# Patient Record
Sex: FEMALE | Race: BLACK OR AFRICAN AMERICAN | NOT HISPANIC OR LATINO | Employment: FULL TIME | URBAN - METROPOLITAN AREA
[De-identification: names, ages, dates, MRNs, and addresses within clinical notes are randomized per-mention and may not be internally consistent; named-entity substitution may affect disease eponyms.]

---

## 2017-01-17 PROCEDURE — 99283 EMERGENCY DEPT VISIT LOW MDM: CPT | Mod: 25

## 2017-01-17 PROCEDURE — 81025 URINE PREGNANCY TEST: CPT | Performed by: EMERGENCY MEDICINE

## 2017-01-18 ENCOUNTER — NURSE TRIAGE (OUTPATIENT)
Dept: ADMINISTRATIVE | Facility: CLINIC | Age: 32
End: 2017-01-18

## 2017-01-18 ENCOUNTER — HOSPITAL ENCOUNTER (EMERGENCY)
Facility: OTHER | Age: 32
Discharge: HOME | End: 2017-01-18
Attending: EMERGENCY MEDICINE
Payer: COMMERCIAL

## 2017-01-18 VITALS
OXYGEN SATURATION: 99 % | DIASTOLIC BLOOD PRESSURE: 93 MMHG | BODY MASS INDEX: 37.74 KG/M2 | TEMPERATURE: 98 F | SYSTOLIC BLOOD PRESSURE: 147 MMHG | HEART RATE: 73 BPM | HEIGHT: 63 IN | RESPIRATION RATE: 16 BRPM | WEIGHT: 213 LBS

## 2017-01-18 DIAGNOSIS — R51.9 ACUTE NONINTRACTABLE HEADACHE, UNSPECIFIED HEADACHE TYPE: Primary | ICD-10-CM

## 2017-01-18 LAB
B-HCG UR QL: NEGATIVE
CTP QC/QA: YES

## 2017-01-18 PROCEDURE — 25000003 PHARM REV CODE 250: Performed by: EMERGENCY MEDICINE

## 2017-01-18 RX ORDER — BUTALBITAL, ACETAMINOPHEN AND CAFFEINE 50; 325; 40 MG/1; MG/1; MG/1
1 TABLET ORAL
Status: COMPLETED | OUTPATIENT
Start: 2017-01-18 | End: 2017-01-18

## 2017-01-18 RX ADMIN — BUTALBITAL, ACETAMINOPHEN AND CAFFEINE 1 TABLET: 50; 325; 40 TABLET ORAL at 12:01

## 2017-01-18 NOTE — ED NOTES
Pt given d/c instructions reported she had no pain and only wanted a work note. Pt ambulated with steady gait to d/c window and d/c in stable condition.

## 2017-01-18 NOTE — TELEPHONE ENCOUNTER
Was seen in ED last evening for a headache and was given medication for the headache. Now does not feel any better. Feeling nauseated and not feeling well.Pt calling to find out if the medication is causing her nausea. Pt advised that I am not able to determine if medication given 8 hours ago is causing nausea.  Pt advised to follow up with PCP when office opens to discuss nausea and follow up from ED.

## 2017-01-18 NOTE — TELEPHONE ENCOUNTER
Reason for Disposition   Caller has NON-URGENT medication question about med that PCP prescribed and triager unable to answer question    Protocols used: ST MEDICATION QUESTION CALL-A-  pt advised to urgent care for severe nausea prior to consulting with PCP.

## 2017-01-18 NOTE — ED AVS SNAPSHOT
[facility]  [address] [address]               Azucena Magana   2017 12:16 AM   ED    Description:  Female : 1985   Department:  [facility]           Your Care was Coordinated By:     Provider Role From To    Papito Lazo MD Attending Provider 17 0017 --      Reason for Visit     Headache           Diagnoses this Visit        Comments    Acute nonintractable headache, unspecified headache type    -  Primary       ED Disposition     None           To Do List           Follow-up Information     Follow up with Yovanny Lott Jr, MD. Schedule an appointment as soon as possible for a visit in 3 days.    Specialty:  Family Medicine    Contact information:    [address]  [facility] [id number] [phone]          Follow up with [facility].    Specialty:  Emergency Medicine    Why:  If symptoms worsen    Contact information:    [address] [address] [phone]      [facility]     [facility] -  Assistance  Registered nurses in the [facility] provide clinical advisement, health education, appointment booking, and other advisory services.  Call for this free service at [phone].             Medications           Message regarding Medications     Verify the changes and/or additions to your medication regime listed below are the same as discussed with your clinician today.  If any of these changes or additions are incorrect, please notify your healthcare provider.        These medications were administered today        Dose Freq    butalbital-acetaminophen-caffeine -40 mg per tablet 1 tablet 1 tablet ED 1 Time    Sig: Take 1 tablet by mouth ED 1 Time.    Class: Normal    Route: Oral           Verify that the below list of medications is an accurate representation of the medications you are  "currently taking.  If none reported, the list may be blank. If incorrect, please contact your healthcare provider. Carry this list with you in case of emergency.           Current Medications     hydrochlorothiazide (MICROZIDE) 12.5 mg capsule Take 12.5 mg by mouth once daily.    ibuprofen (ADVIL,MOTRIN) 600 MG tablet Take 1 tablet (600 mg total) by mouth every 6 (six) hours as needed for Pain (cramping).    butalbital-acetaminophen-caffeine -40 mg per tablet 1 tablet Take 1 tablet by mouth ED 1 Time.           Clinical Reference Information           Your Vitals Were     BP Pulse Temp Resp Height Weight    156/82 (BP Location: Left arm, Patient Position: Sitting) 76 98.2 °F (36.8 °C) (Oral) 14 5' 3" (1.6 m) 96.6 kg (213 lb)    SpO2 BMI             100% 37.73 kg/m2         Allergies as of 1/18/2017     No Known Allergies      Immunizations Administered on Date of Encounter - 1/18/2017     None      ED Micro, Lab, POCT     Start Ordered       Status Ordering Provider    01/17/17 2245 01/17/17 2244  POCT urine pregnancy  Once      Final result       ED Imaging Orders     None      Discharge References/Attachments     HEADACHES, SELF-CARE FOR (ENGLISH)       [facility] complies with applicable Federal civil rights laws and does not discriminate on the basis of race, color, national origin, age, disability, or sex.        Language Assistance Services     ATTENTION: Language assistance services are available, free of charge. Please call [phone].      ATENCIÓN: Si habla español, tiene a ventura disposición servicios gratuitos de asistencia lingüística. Llame al [phone].     [facility] Ý: N?u b?n nói Ti?ng Vi?t, có các d?ch v? h? tr? ngôn ng? mi?n phí dành cho b?n. G?i s? [phone].        "

## 2017-01-18 NOTE — ED PROVIDER NOTES
"Encounter Date: 1/17/2017    SCRIBE #1 NOTE: I, Kena Ash , am scribing for, and in the presence of, Dr. Lazo.       History     Chief Complaint   Patient presents with    Headache     X 3 or 4 days did not take anything for it     Review of patient's allergies indicates:  No Known Allergies  HPI Comments: Time seen by provider: 12:30 AM    This is a 31 y.o. female who presents with complaint of headache. Symptoms began four days ago. Frontal headache is described as an intermittent and progressively worsening "band-like" sensation. Pt has no other complaints, and denies fever, chills, nausea, vomiting, congestion, myalgias, weakness, or numbness. Pt experiences headaches with high blood pressure, and took Ibuprofen this morning with little relief.    The history is provided by the patient.     Past Medical History   Diagnosis Date    Abnormal Pap smear of cervix     Hypertension     Migraine headache     Obesity      No past medical history pertinent negatives.  Past Surgical History   Procedure Laterality Date    Pr treat ectopic preg,non remval Right 7/12/15     Right salpingectomy and partial cornual resection     Family History   Problem Relation Age of Onset    Ovarian cancer Maternal Grandmother     Colon cancer Neg Hx     Breast cancer Neg Hx      Social History   Substance Use Topics    Smoking status: Never Smoker    Smokeless tobacco: None    Alcohol use No     Review of Systems   Constitutional: Negative for chills and fever.   HENT: Negative for congestion and sore throat.    Eyes: Negative for redness and visual disturbance.   Respiratory: Negative for cough and shortness of breath.    Cardiovascular: Negative for chest pain and palpitations.   Gastrointestinal: Negative for abdominal pain, diarrhea, nausea and vomiting.   Genitourinary: Negative for dysuria.   Musculoskeletal: Negative for back pain and myalgias.   Skin: Negative for rash.   Neurological: Positive for headaches. " Negative for weakness and numbness.   Psychiatric/Behavioral: Negative for confusion.       Physical Exam   Initial Vitals   BP Pulse Resp Temp SpO2   01/17/17 2242 01/17/17 2242 01/17/17 2242 01/17/17 2242 01/17/17 2242   156/82 76 14 98.2 °F (36.8 °C) 100 %     Physical Exam    Nursing note and vitals reviewed.  Constitutional: She appears well-developed and well-nourished. She is not diaphoretic. No distress.   HENT:   Head: Normocephalic and atraumatic.   Right Ear: External ear normal.   Left Ear: External ear normal.   Eyes: Conjunctivae and EOM are normal. Pupils are equal, round, and reactive to light. Right eye exhibits no discharge. Left eye exhibits no discharge. No scleral icterus.   Neck: Normal range of motion. Neck supple.   Cardiovascular: Normal rate, regular rhythm, normal heart sounds and intact distal pulses. Exam reveals no gallop and no friction rub.    No murmur heard.  Pulmonary/Chest: Breath sounds normal. No stridor. No respiratory distress. She has no wheezes. She has no rhonchi. She has no rales.   Abdominal: Soft. She exhibits no distension. There is no tenderness. There is no rebound and no guarding.   Musculoskeletal: Normal range of motion. She exhibits no edema or tenderness.   Neurological: She is alert and oriented to person, place, and time. She has normal strength. No cranial nerve deficit.   Cranial nerves II through XII grossly intact.  5/5 motor strength all 4 extremities.  Sensation is normal.  Finger to nose normal.  Gait normal.  Speech and cognition is normal.  No focal neurologic deficit.   Skin: Skin is warm and dry. No rash noted. No erythema. No pallor.   Psychiatric: She has a normal mood and affect. Her behavior is normal. Judgment and thought content normal.         ED Course   Procedures  Labs Reviewed   POCT URINE PREGNANCY             Medical Decision Making:   Clinical Tests:   Lab Tests: Ordered and Reviewed  ED Management:  Well-appearing patient presents with  "complaint of headache.  There are no findings concerning for subarachnoid hemorrhage.  Namely she reports this headache started off several days ago as one of her "usual headaches.  She cannot pinpoint the time that it started, it was not maximal at onset.  Initially resolved with over-the-counter medications as her headache usually do.  The HA has returned several times in the weekend.  And she awoke with it again this morning.  Progressively worsening.  Bandlike.  Does not involve her neck at all.  And is not the worst headache of her life.  Completely normal neurologic examination.  Administered Fioricet and discharged to be driven home by her  that she might rest break the cycle of pain.    UNA Lazo M.D.  01/18/2017  5:17 AM              Scribe Attestation:   Scribe #1: I performed the above scribed service and the documentation accurately describes the services I performed. I attest to the accuracy of the note.    Attending Attestation:           Physician Attestation for Scribe:  Physician Attestation Statement for Scribe #1: I, Dr. Lazo, reviewed documentation, as scribed by Kena Ash  in my presence, and it is both accurate and complete.                 ED Course     Clinical Impression:     1. Acute nonintractable headache, unspecified headache type                Papito Lazo MD  01/18/17 0517    "

## 2017-12-21 ENCOUNTER — HOSPITAL ENCOUNTER (EMERGENCY)
Facility: OTHER | Age: 32
Discharge: HOME | End: 2017-12-22
Attending: EMERGENCY MEDICINE
Payer: COMMERCIAL

## 2017-12-21 DIAGNOSIS — S39.012A BACK STRAIN, INITIAL ENCOUNTER: Primary | ICD-10-CM

## 2017-12-21 LAB
B-HCG UR QL: NEGATIVE
CTP QC/QA: YES

## 2017-12-21 PROCEDURE — 99283 EMERGENCY DEPT VISIT LOW MDM: CPT | Mod: 25

## 2017-12-21 PROCEDURE — 96372 THER/PROPH/DIAG INJ SC/IM: CPT

## 2017-12-21 PROCEDURE — 81025 URINE PREGNANCY TEST: CPT | Performed by: EMERGENCY MEDICINE

## 2017-12-21 RX ORDER — IBUPROFEN 800 MG/1
800 TABLET ORAL EVERY 6 HOURS PRN
Qty: 20 TABLET | Refills: 0 | OUTPATIENT
Start: 2017-12-21 | End: 2020-03-02

## 2017-12-21 RX ORDER — CYCLOBENZAPRINE HCL 10 MG
10 TABLET ORAL 3 TIMES DAILY PRN
Qty: 15 TABLET | Refills: 0 | Status: SHIPPED | OUTPATIENT
Start: 2017-12-21 | End: 2017-12-26

## 2017-12-21 RX ORDER — ORPHENADRINE CITRATE 30 MG/ML
60 INJECTION INTRAMUSCULAR; INTRAVENOUS
Status: COMPLETED | OUTPATIENT
Start: 2017-12-22 | End: 2017-12-21

## 2017-12-21 RX ADMIN — ORPHENADRINE CITRATE 60 MG: 30 INJECTION INTRAMUSCULAR; INTRAVENOUS at 11:12

## 2017-12-22 VITALS
DIASTOLIC BLOOD PRESSURE: 86 MMHG | RESPIRATION RATE: 18 BRPM | SYSTOLIC BLOOD PRESSURE: 136 MMHG | HEIGHT: 63 IN | BODY MASS INDEX: 42.07 KG/M2 | WEIGHT: 237.44 LBS | TEMPERATURE: 98 F | OXYGEN SATURATION: 100 % | HEART RATE: 91 BPM

## 2017-12-22 PROCEDURE — 63600175 PHARM REV CODE 636 W HCPCS: Performed by: EMERGENCY MEDICINE

## 2017-12-22 NOTE — ED PROVIDER NOTES
Encounter Date: 12/21/2017    SCRIBE #1 NOTE: IKera, am scribing for, and in the presence of, Dr. Mcgregor.       History     Chief Complaint   Patient presents with    Back Pain     lumbar back pain x 1 week, picked up a heavy box of papers last week @ work     Time seen by provider: 11:37 PM    This is a 32 y.o. female who presents with complaint of lower back pain x 5 days. Pt states pain started about 2 days after picking up heavy boxes at work. Pain is to the mid back, radiating diffusely throughout the back. It is exacerbated with movement and mitigated with staying still. She describes the pain as an aching pain, with intermittent episodes of increased severity. Pain is rated 8/10. She feels pain in her chest with the episodes of increased severity. Pt reports no associated numbness, weakness, tingling, bowel incontinence, urinary incontinence, or urinary retention. She denies any fever, chills, dysuria, urinary frequency, palpitations, and cough.    Pt also states that her LNMP was the end of October to the beginning of November. She admits that her cycle is irregular, but states that this is abnormal. Pt had 3 (-) at home pregnancy tests. She denies any N/V/D and abdominal pain.      The history is provided by the patient.     Review of patient's allergies indicates:  No Known Allergies  Past Medical History:   Diagnosis Date    Abnormal Pap smear of cervix     Hypertension     Migraine headache     Obesity      Past Surgical History:   Procedure Laterality Date    SC TREAT ECTOPIC PREG,NON REMVAL Right 7/12/15    Right salpingectomy and partial cornual resection     Family History   Problem Relation Age of Onset    Ovarian cancer Maternal Grandmother     Colon cancer Neg Hx     Breast cancer Neg Hx      Social History   Substance Use Topics    Smoking status: Never Smoker    Smokeless tobacco: Never Used    Alcohol use No     Review of Systems   Constitutional: Negative for chills  and fever.   HENT: Negative for congestion, rhinorrhea and sore throat.    Respiratory: Negative for cough and shortness of breath.    Cardiovascular: Positive for chest pain (with back pain). Negative for palpitations and leg swelling.   Gastrointestinal: Negative for abdominal pain, diarrhea, nausea and vomiting.        No bowel incontinence.   Endocrine: Negative for polyuria.   Genitourinary: Negative for decreased urine volume, dysuria, frequency and urgency.        No urinary incontinence or retention.   Musculoskeletal: Positive for back pain. Negative for gait problem and neck pain.   Skin: Negative for rash.   Allergic/Immunologic: Negative for immunocompromised state.   Neurological: Negative for dizziness, weakness, light-headedness and numbness.        No tingling.   Hematological: Does not bruise/bleed easily.   Psychiatric/Behavioral: Negative for confusion.       Physical Exam     Initial Vitals [12/21/17 2222]   BP Pulse Resp Temp SpO2   (!) 171/85 100 20 98.3 °F (36.8 °C) 100 %      MAP       113.67         Physical Exam    Nursing note and vitals reviewed.  Constitutional: She appears well-developed and well-nourished. She is not diaphoretic. No distress.   HENT:   Head: Normocephalic and atraumatic.   Right Ear: External ear normal.   Left Ear: External ear normal.   Eyes: Right eye exhibits no discharge. Left eye exhibits no discharge.   Neck: Normal range of motion. Neck supple.   Cardiovascular: Normal rate, regular rhythm and normal heart sounds. Exam reveals no gallop and no friction rub.    No murmur heard.  Pulmonary/Chest: Breath sounds normal. No respiratory distress. She has no wheezes. She has no rhonchi. She has no rales.   Abdominal: Soft. Bowel sounds are normal. She exhibits no distension. There is no tenderness. There is no rebound and no guarding.   Musculoskeletal: Normal range of motion. She exhibits no edema.   L lumbar and R thoracic paraspinal tenderness. Diffuse midline  thoracic tenderness. No focal bony tenderness.   Lymphadenopathy:     She has no cervical adenopathy.   Neurological: She is alert and oriented to person, place, and time. She has normal strength. No sensory deficit.   Strength 5/5 to all extremities. Gait normal.   Skin: Skin is warm and dry. No rash noted. No erythema.   Psychiatric: She has a normal mood and affect. Her behavior is normal.         ED Course   Procedures  Labs Reviewed   POCT URINE PREGNANCY - Normal             Medical Decision Making:   Clinical Tests:   Lab Tests: Ordered and Reviewed    Additional MDM:   Comments: 32-year-old female presents complaining of back pain after heavy lifting.  On exam she had diffuse tenderness to palpation of the thoracic and lumbar paraspinal regions.  Her exam was otherwise unremarkable.  Patient was treated with Norflex in the emergency department and discharged home with a prescription for Motrin and Flexeril..          Scribe Attestation:   Scribe #1: I performed the above scribed service and the documentation accurately describes the services I performed. I attest to the accuracy of the note.    Attending Attestation:           Physician Attestation for Scribe:  Physician Attestation Statement for Scribe #1: I, Dr. Mcgregor, reviewed documentation, as scribed by Kera Stanton in my presence, and it is both accurate and complete.                 ED Course      Clinical Impression:     1. Back strain, initial encounter                               Fatimah Mcgregor MD  12/22/17 8341

## 2017-12-22 NOTE — ED NOTES
Pt c/o back pain x 2 days after picking up a box full of papers at work. Pain in between scapula, rates pain 8/10. Pain worse when moves shoulders. Pt denies any paresthesias. Pt denies any arm weaknesses. Pt in NAD

## 2018-02-19 ENCOUNTER — TELEPHONE (OUTPATIENT)
Dept: PLASTIC SURGERY | Facility: CLINIC | Age: 33
End: 2018-02-19

## 2018-02-19 NOTE — TELEPHONE ENCOUNTER
Pt needs consult appt for breast reduction    Called and left message for  to call back for an appointment

## 2018-02-19 NOTE — TELEPHONE ENCOUNTER
----- Message from Fredi Sanz sent at 2/19/2018  3:01 PM CST -----  Pt needs consult appt for breast reduction. Please call the pt at [phone]

## 2018-02-25 ENCOUNTER — HOSPITAL ENCOUNTER (EMERGENCY)
Facility: OTHER | Age: 33
Discharge: HOME | End: 2018-02-25
Attending: EMERGENCY MEDICINE
Payer: COMMERCIAL

## 2018-02-25 VITALS
HEIGHT: 62 IN | RESPIRATION RATE: 18 BRPM | OXYGEN SATURATION: 100 % | SYSTOLIC BLOOD PRESSURE: 120 MMHG | WEIGHT: 212 LBS | TEMPERATURE: 98 F | DIASTOLIC BLOOD PRESSURE: 72 MMHG | BODY MASS INDEX: 39.01 KG/M2 | HEART RATE: 87 BPM

## 2018-02-25 DIAGNOSIS — L03.119 CELLULITIS OF ELBOW: Primary | ICD-10-CM

## 2018-02-25 LAB
B-HCG UR QL: NEGATIVE
CTP QC/QA: YES

## 2018-02-25 PROCEDURE — 25000003 PHARM REV CODE 250: Performed by: PHYSICIAN ASSISTANT

## 2018-02-25 PROCEDURE — 81025 URINE PREGNANCY TEST: CPT | Performed by: EMERGENCY MEDICINE

## 2018-02-25 PROCEDURE — 99284 EMERGENCY DEPT VISIT MOD MDM: CPT | Mod: 25

## 2018-02-25 RX ORDER — IBUPROFEN 600 MG/1
600 TABLET ORAL
Status: COMPLETED | OUTPATIENT
Start: 2018-02-25 | End: 2018-02-25

## 2018-02-25 RX ORDER — CEPHALEXIN 500 MG/1
500 CAPSULE ORAL 4 TIMES DAILY
Qty: 20 CAPSULE | Refills: 0 | Status: SHIPPED | OUTPATIENT
Start: 2018-02-25 | End: 2018-03-02

## 2018-02-25 RX ORDER — SULFAMETHOXAZOLE AND TRIMETHOPRIM 800; 160 MG/1; MG/1
1 TABLET ORAL 2 TIMES DAILY
Qty: 14 TABLET | Refills: 0 | Status: SHIPPED | OUTPATIENT
Start: 2018-02-25 | End: 2018-03-04

## 2018-02-25 RX ORDER — IBUPROFEN 600 MG/1
600 TABLET ORAL EVERY 6 HOURS PRN
Qty: 20 TABLET | Refills: 0 | OUTPATIENT
Start: 2018-02-25 | End: 2020-03-02

## 2018-02-25 RX ORDER — MUPIROCIN 20 MG/G
1 OINTMENT TOPICAL
Status: COMPLETED | OUTPATIENT
Start: 2018-02-25 | End: 2018-02-25

## 2018-02-25 RX ADMIN — IBUPROFEN 600 MG: 600 TABLET, FILM COATED ORAL at 01:02

## 2018-02-25 RX ADMIN — MUPIROCIN 22 G: 20 OINTMENT TOPICAL at 01:02

## 2018-02-25 NOTE — ED NOTES
Two patient identifiers have been checked and are correct.      Appearance: Pt awake, alert & oriented to person, place & time. Pt in no acute distress at present time. Pt is clean and well groomed with clothes appropriately fastened.   Skin: Skin warm, dry & intact. Color consistent with ethnicity. Mucous membranes moist. No breakdown or brusing noted. + red raised area noted to left elbow, no drainage or warmth noted to site.   Musculoskeletal: Patient moving all extremities well, no obvious swelling or deformities noted.   Respiratory: Respirations spontaneous, even, and non-labored. Visible chest rise noted. Airway is open and patent. No accessory muscle use noted.   Neurologic: Sensation is intact. Speech is clear and appropriate. Eyes open spontaneously, behavior appropriate to situation, follows commands, facial expression symmetrical, bilateral hand grasp equal and even, purposeful motor response noted.  Cardiac: All peripheral pulses present. No Bilateral lower extremity edema. Cap refill is <3 seconds.

## 2018-02-25 NOTE — ED PROVIDER NOTES
"Encounter Date: 2/25/2018       History     Chief Complaint   Patient presents with    Abscess     + left sided abscess " to my elbow" since Friday. Denies drainge to area, fever or chills     Patient is 32-year-old female who presents with complaints of area of pain, swelling, tenderness to the left elbow.  Reports area has been present for 2 days prior to arrival.  She reports no pain with moving her elbow only with palpating over the area.  She reports her  tried to squeeze pus out of this "abscess" earlier today and she reports it began to swell and became very painful after doing this.  She has not been taking any medications to help with symptoms.  She has been applying warm compresses but admits she is only doing this once a day.  She denies fever, chills, nausea, vomiting, chest pain, shortness of breath.  No elbow trauma or injury.  No history of elbow injury or surgery in the past.  Currently unaccompanied in the ER.          Review of patient's allergies indicates:  No Known Allergies  Past Medical History:   Diagnosis Date    Abnormal Pap smear of cervix     Hypertension     Migraine headache     Obesity      Past Surgical History:   Procedure Laterality Date    MO TREAT ECTOPIC PREG,NON REMVAL Right 7/12/15    Right salpingectomy and partial cornual resection     Family History   Problem Relation Age of Onset    Ovarian cancer Maternal Grandmother     Colon cancer Neg Hx     Breast cancer Neg Hx      Social History   Substance Use Topics    Smoking status: Never Smoker    Smokeless tobacco: Never Used    Alcohol use No     Review of Systems   Constitutional: Negative for fever.   HENT: Negative for sore throat.    Respiratory: Negative for shortness of breath.    Cardiovascular: Negative for chest pain.   Gastrointestinal: Negative for abdominal pain, diarrhea, nausea and vomiting.   Genitourinary: Negative for dysuria.   Musculoskeletal: Negative for back pain.        Left elbow pain "   Skin: Positive for wound. Negative for rash.   Neurological: Negative for weakness.   Hematological: Does not bruise/bleed easily.       Physical Exam     Initial Vitals [02/25/18 1203]   BP Pulse Resp Temp SpO2   (!) 159/86 100 18 97.3 °F (36.3 °C) 98 %      MAP       110.33         Physical Exam    Nursing note and vitals reviewed.  Constitutional: She appears well-developed and well-nourished. She is not diaphoretic. No distress.   Healthy appearing female in no acute distress or apparent pain.  She makes good eye contact, speaks in clear full sentences and ambulates with ease.   HENT:   Head: Normocephalic and atraumatic.   Eyes: Conjunctivae and EOM are normal. Pupils are equal, round, and reactive to light. Right eye exhibits no discharge. Left eye exhibits no discharge. No scleral icterus.   Neck: Normal range of motion.   Cardiovascular: Normal rate, regular rhythm, normal heart sounds and intact distal pulses. Exam reveals no gallop and no friction rub.    No murmur heard.  Pulmonary/Chest: Breath sounds normal. She has no wheezes. She has no rhonchi. She has no rales.   Abdominal: Soft. Bowel sounds are normal. There is no tenderness. There is no rebound and no guarding.   Musculoskeletal: Normal range of motion. She exhibits no edema or tenderness.   Left elbow has area of exquisite tenderness to palpation with warm to touch over olecranon process.  This area is 2 cm circular area with centralized fluctuance.  Fluctuant area is approximately 0.5 cm in circumference.  No active bleeding or purulence.  Normal range of motion especially flexion and extension and supination pronation of the elbow.  No lymphangitis, extremity edema, abnormal pulse, sensation or strength against resistance.   Lymphadenopathy:     She has no cervical adenopathy.   Neurological: She is alert and oriented to person, place, and time. She has normal strength. No cranial nerve deficit or sensory deficit.   Skin: Skin is warm.  Capillary refill takes less than 2 seconds. No rash and no abscess noted. No erythema.   Psychiatric: She has a normal mood and affect. Her behavior is normal. Thought content normal.         ED Course   Procedures  Labs Reviewed   POCT URINE PREGNANCY             Medical Decision Making:   ED Management:  Urgent evaluation of 32-year-old female who presents with complaints most consistent with cellulitis and abscess to left elbow.  She is afebrile, nontoxic appearing, hemodynamically stable.  Physical exam outlined above and reveals centralized fluctuance with very superficial appearing pocket of infection surrounding cellulitis.  No concern for septic joint.  Patient adamantly refuses I&D even though I clearly explain the analgesia process.  In the setting of no I&D I feel the second best option is to apply antibiotic ointment topically with accompanying warm compresses multiple times throughout the day.  Will also prescribe Keflex & Bactrim to be taken orally with anti-inflammatories for pain.  She is strictly instructed on appropriate management in the outpatient setting and is told if symptoms worsen to any degree in the next 24 hours she has to return for reconsideration of I&D.  She verbalizes understanding and is amenable to this plan.                      Clinical Impression:   The encounter diagnosis was Cellulitis of elbow.                           Yolanda Feng PA-C  02/25/18 3950

## 2018-06-06 ENCOUNTER — HOSPITAL ENCOUNTER (EMERGENCY)
Facility: OTHER | Age: 33
Discharge: HOME | End: 2018-06-06
Attending: EMERGENCY MEDICINE
Payer: COMMERCIAL

## 2018-06-06 VITALS
TEMPERATURE: 98 F | OXYGEN SATURATION: 98 % | RESPIRATION RATE: 18 BRPM | DIASTOLIC BLOOD PRESSURE: 87 MMHG | SYSTOLIC BLOOD PRESSURE: 152 MMHG | BODY MASS INDEX: 38.98 KG/M2 | WEIGHT: 220 LBS | HEIGHT: 63 IN | HEART RATE: 80 BPM

## 2018-06-06 DIAGNOSIS — R51.9 ACUTE NONINTRACTABLE HEADACHE, UNSPECIFIED HEADACHE TYPE: Primary | ICD-10-CM

## 2018-06-06 LAB
B-HCG UR QL: NEGATIVE
CTP QC/QA: YES

## 2018-06-06 PROCEDURE — 63600175 PHARM REV CODE 636 W HCPCS: Performed by: PHYSICIAN ASSISTANT

## 2018-06-06 PROCEDURE — 96372 THER/PROPH/DIAG INJ SC/IM: CPT

## 2018-06-06 PROCEDURE — 99283 EMERGENCY DEPT VISIT LOW MDM: CPT | Mod: 25

## 2018-06-06 PROCEDURE — 81025 URINE PREGNANCY TEST: CPT | Performed by: PHYSICIAN ASSISTANT

## 2018-06-06 PROCEDURE — 25000003 PHARM REV CODE 250: Performed by: PHYSICIAN ASSISTANT

## 2018-06-06 RX ORDER — BUTALBITAL, ACETAMINOPHEN AND CAFFEINE 50; 325; 40 MG/1; MG/1; MG/1
2 TABLET ORAL
Status: COMPLETED | OUTPATIENT
Start: 2018-06-06 | End: 2018-06-06

## 2018-06-06 RX ORDER — DIPHENHYDRAMINE HYDROCHLORIDE 50 MG/ML
25 INJECTION INTRAMUSCULAR; INTRAVENOUS
Status: COMPLETED | OUTPATIENT
Start: 2018-06-06 | End: 2018-06-06

## 2018-06-06 RX ORDER — METOCLOPRAMIDE HYDROCHLORIDE 5 MG/ML
10 INJECTION INTRAMUSCULAR; INTRAVENOUS
Status: COMPLETED | OUTPATIENT
Start: 2018-06-06 | End: 2018-06-06

## 2018-06-06 RX ADMIN — BUTALBITAL, ACETAMINOPHEN AND CAFFEINE 2 TABLET: 50; 325; 40 TABLET ORAL at 05:06

## 2018-06-06 RX ADMIN — METOCLOPRAMIDE 10 MG: 5 INJECTION, SOLUTION INTRAMUSCULAR; INTRAVENOUS at 05:06

## 2018-06-06 RX ADMIN — DIPHENHYDRAMINE HYDROCHLORIDE 25 MG: 50 INJECTION, SOLUTION INTRAMUSCULAR; INTRAVENOUS at 05:06

## 2018-06-06 NOTE — ED NOTES
Pt to ED with frontal headache x 3 days. Pt denies nausea, vomiting, blurred vision, photophobia, extremity weakness/numbness/tingling. She also reports she has not taken her BP medication x 3 days due to not feeling well. Pt AAOx4 and appropriate at this time. Respirations even and unlabored. No acute distress noted.

## 2018-06-07 NOTE — ED PROVIDER NOTES
Encounter Date: 6/6/2018       History     Chief Complaint   Patient presents with    Headache     Patient c/o headache that started monday.  States pain is localized to the front of her head.  States she has similar headaches when she has elevated pressure.       Patient is a 33-year-old female with history of hypertension and chronic headaches who presents to the emergency department with headache.  Patient states since Monday she has been having a headache that has progressively worsened.  She rates her pain 10/10.  She states typically the pain resolved on its own.  She denies associated photophobia or visual disturbance.  She denies facial asymmetry, speech slur, or extremity weakness. She denies neck stiffness. She denies fevers or chills. She denies nausea or vomiting.      The history is provided by the patient.     Review of patient's allergies indicates:  No Known Allergies  Past Medical History:   Diagnosis Date    Abnormal Pap smear of cervix     Hypertension     Migraine headache     Obesity      Past Surgical History:   Procedure Laterality Date    [place] TREAT ECTOPIC PREG,NON REMVAL Right 7/12/15    Right salpingectomy and partial cornual resection     Family History   Problem Relation Age of Onset    Ovarian cancer Maternal Grandmother     Colon cancer Neg Hx     Breast cancer Neg Hx      Social History   Substance Use Topics    Smoking status: Never Smoker    Smokeless tobacco: Never Used    Alcohol use No     Review of Systems   Constitutional: Negative for activity change, appetite change, chills, fatigue and fever.   HENT: Negative for congestion, ear discharge, ear pain, postnasal drip, rhinorrhea, sore throat and trouble swallowing.    Eyes: Negative for photophobia and visual disturbance.   Respiratory: Negative for cough and shortness of breath.    Cardiovascular: Negative for chest pain.   Gastrointestinal: Negative for abdominal pain, blood in stool, constipation, diarrhea, nausea  and vomiting.   Genitourinary: Negative for dysuria, flank pain and hematuria.   Musculoskeletal: Negative for back pain, neck pain and neck stiffness.   Skin: Negative for rash and wound.   Neurological: Positive for headaches. Negative for dizziness, syncope, speech difficulty, weakness, light-headedness and numbness.       Physical Exam     Initial Vitals [06/06/18 1636]   BP Pulse Resp Temp SpO2   133/80 70 18 98.1 °F (36.7 °C) 100 %      MAP       97.67         Physical Exam    Nursing note and vitals reviewed.  Constitutional: Vital signs are normal. She appears well-developed and well-nourished. She is not diaphoretic.  Non-toxic appearance. No distress.   HENT:   Head: Normocephalic.   Right Ear: Hearing, tympanic membrane, external ear and ear canal normal.   Left Ear: Hearing, tympanic membrane, external ear and ear canal normal.   Nose: Nose normal.   Mouth/Throat: Uvula is midline, oropharynx is clear and moist and mucous membranes are normal. No oropharyngeal exudate.   Eyes: Conjunctivae are normal. Pupils are equal, round, and reactive to light.   Neck: Normal range of motion and full passive range of motion without pain. Neck supple. Normal range of motion present. No neck rigidity.   Cardiovascular: Normal rate and regular rhythm.   Pulmonary/Chest: Breath sounds normal.   Abdominal: Soft. Bowel sounds are normal. There is no tenderness.   Lymphadenopathy:     She has no cervical adenopathy.   Neurological: She is alert and oriented to person, place, and time. She has normal strength. No cranial nerve deficit or sensory deficit. She displays a negative Romberg sign. Coordination and gait normal.   Skin: Skin is warm and dry. Capillary refill takes less than 2 seconds.   Psychiatric: She has a normal mood and affect.         ED Course   Procedures  Labs Reviewed   POCT URINE PREGNANCY          No orders to display        Medical Decision Making:   Initial Assessment:   Urgent evaluation of a  33-year-old female with history of hypertension and chronic headaches who presents to the emergency department with headache.  Patient is afebrile, nontoxic appearing, and hemodynamically stable.  Normal neuro exam.  No nuchal rigidity.  This feels like patient's typical headache pain.  I do not suspect intracranial abnormality or meningitis.    One view evaluation,  patient is smiling and does not appear in any distress. Patient was given migraine concoction.  She is advised to follow up with PCP or return to the emergency department with any worsening symptoms or concerns.  Other:   I have discussed this case with another health care provider.       <> Summary of the Discussion: Dr. Rutledge                      Clinical Impression:   The encounter diagnosis was Acute nonintractable headache, unspecified headache type.                             Viktoriya Coates PA-C  06/06/18 1900

## 2020-03-02 ENCOUNTER — HOSPITAL ENCOUNTER (EMERGENCY)
Facility: OTHER | Age: 35
Discharge: HOME | End: 2020-03-02
Attending: EMERGENCY MEDICINE
Payer: COMMERCIAL

## 2020-03-02 VITALS
TEMPERATURE: 99 F | DIASTOLIC BLOOD PRESSURE: 108 MMHG | SYSTOLIC BLOOD PRESSURE: 168 MMHG | WEIGHT: 208 LBS | OXYGEN SATURATION: 100 % | RESPIRATION RATE: 18 BRPM | HEART RATE: 78 BPM | HEIGHT: 64 IN | BODY MASS INDEX: 35.51 KG/M2

## 2020-03-02 DIAGNOSIS — B34.9 VIRAL SYNDROME: Primary | ICD-10-CM

## 2020-03-02 DIAGNOSIS — R05.9 COUGH: ICD-10-CM

## 2020-03-02 LAB
B-HCG UR QL: NEGATIVE
CTP QC/QA: YES
CTP QC/QA: YES
POC MOLECULAR INFLUENZA A AGN: NEGATIVE
POC MOLECULAR INFLUENZA B AGN: NEGATIVE

## 2020-03-02 PROCEDURE — 81025 URINE PREGNANCY TEST: CPT | Performed by: EMERGENCY MEDICINE

## 2020-03-02 PROCEDURE — 25000003 PHARM REV CODE 250: Performed by: PHYSICIAN ASSISTANT

## 2020-03-02 PROCEDURE — 99284 EMERGENCY DEPT VISIT MOD MDM: CPT | Mod: 25

## 2020-03-02 RX ORDER — IBUPROFEN 600 MG/1
600 TABLET ORAL
Status: COMPLETED | OUTPATIENT
Start: 2020-03-02 | End: 2020-03-02

## 2020-03-02 RX ORDER — FLUTICASONE PROPIONATE 50 MCG
1 SPRAY, SUSPENSION (ML) NASAL 2 TIMES DAILY PRN
Qty: 15 G | Refills: 0 | Status: ON HOLD | OUTPATIENT
Start: 2020-03-02 | End: 2021-03-13 | Stop reason: HOSPADM

## 2020-03-02 RX ORDER — BENZONATATE 100 MG/1
100 CAPSULE ORAL 3 TIMES DAILY PRN
Qty: 20 CAPSULE | Refills: 0 | Status: SHIPPED | OUTPATIENT
Start: 2020-03-02 | End: 2020-03-12

## 2020-03-02 RX ORDER — IBUPROFEN 600 MG/1
600 TABLET ORAL EVERY 6 HOURS PRN
Qty: 20 TABLET | Refills: 0 | Status: SHIPPED | OUTPATIENT
Start: 2020-03-02 | End: 2020-07-22 | Stop reason: ALTCHOICE

## 2020-03-02 RX ADMIN — IBUPROFEN 600 MG: 600 TABLET, FILM COATED ORAL at 12:03

## 2020-03-02 NOTE — ED PROVIDER NOTES
Encounter Date: 3/2/2020       History     Chief Complaint   Patient presents with    Fever     Fever of 101 last night with chills and a sore throat.      Patient is a 34-year-old female presenting to the emergency department complaints of fever, body aches, cough and congestion.  Patient states that her symptoms started 3 days ago and have been constant since.  She states that she has a cough that is occasionally productive of mucus.  She admits that she had a fever of 101° F last night.  She also reports a sore throat and body aches.  She denies any vomiting. She states she did taking Tylenol and cold and flu medication over-the-counter for symptoms, last dose was yesterday.  She admits she has multiple known sick contacts, stating that everyone were gets sick.  She did not get a flu shot this year.This is the extent of the patient's complaints at this time.       The history is provided by the patient.     Review of patient's allergies indicates:  No Known Allergies  Past Medical History:   Diagnosis Date    Abnormal Pap smear of cervix     Hypertension     Migraine headache     Obesity      Past Surgical History:   Procedure Laterality Date    [place] TREAT ECTOPIC PREG,NON REMVAL Right 7/12/15    Right salpingectomy and partial cornual resection     Family History   Problem Relation Age of Onset    Ovarian cancer Maternal Grandmother     Colon cancer Neg Hx     Breast cancer Neg Hx      Social History     Tobacco Use    Smoking status: Never Smoker    Smokeless tobacco: Never Used   Substance Use Topics    Alcohol use: No    Drug use: No     Review of Systems   Constitutional: Positive for fatigue and fever. Negative for activity change, appetite change and chills.   HENT: Positive for congestion, rhinorrhea and sore throat.    Eyes: Negative for photophobia and visual disturbance.   Respiratory: Positive for cough. Negative for shortness of breath and wheezing.    Cardiovascular: Negative for chest  pain.   Gastrointestinal: Negative for abdominal pain, diarrhea, nausea and vomiting.   Genitourinary: Negative for dysuria, hematuria and urgency.   Musculoskeletal: Positive for myalgias. Negative for back pain and neck pain.   Skin: Negative for color change and wound.   Neurological: Negative for weakness and headaches.   Psychiatric/Behavioral: Negative for agitation and confusion.       Physical Exam     Initial Vitals [03/02/20 1145]   BP Pulse Resp Temp SpO2   (!) 158/100 78 18 98.8 °F (37.1 °C) 100 %      MAP       --         Physical Exam    Nursing note and vitals reviewed.  Constitutional: She appears well-developed and well-nourished. She is not diaphoretic. She is cooperative.  Non-toxic appearance. She does not have a sickly appearance. She does not appear ill. No distress.   Well-appearing,  female accompanied the emergency room.  Speaking clearly full sentences.  No acute distress.   HENT:   Head: Normocephalic and atraumatic.   Right Ear: Hearing, tympanic membrane, external ear and ear canal normal.   Left Ear: Hearing, tympanic membrane, external ear and ear canal normal.   Nose: Mucosal edema present.   Mouth/Throat: Oropharynx is clear and moist and mucous membranes are normal.   Eyes: Conjunctivae and EOM are normal.   Neck: Normal range of motion. Neck supple.   Cardiovascular: Normal rate, regular rhythm and normal heart sounds.   Pulmonary/Chest: Breath sounds normal. No respiratory distress. She has no wheezes.   Abdominal: Soft. Bowel sounds are normal. She exhibits no distension. There is no tenderness. There is no rebound and no guarding.   Musculoskeletal: Normal range of motion.   Neurological: She is alert and oriented to person, place, and time. GCS eye subscore is 4. GCS verbal subscore is 5. GCS motor subscore is 6.   Skin: Skin is warm.   Psychiatric: She has a normal mood and affect. Her behavior is normal. Judgment and thought content normal.         ED Course    Procedures  Labs Reviewed   POCT URINE PREGNANCY   POCT INFLUENZA A/B MOLECULAR          Imaging Results          X-Ray Chest PA And Lateral (Final result)  Result time 03/02/20 12:40:51    Final result by Timothy Desai MD (03/02/20 12:40:51)                 Impression:      No radiographic acute intrathoracic process seen.  Specifically, no focal consolidation.      Electronically signed by: Timothy Desai MD  Date:    03/02/2020  Time:    12:40             Narrative:    EXAMINATION:  XR CHEST PA AND LATERAL    CLINICAL HISTORY:  Cough    TECHNIQUE:  PA and lateral views of the chest were performed.    COMPARISON:  Chest radiograph 11/28/2016    FINDINGS:  No detrimental change.The lungs are clear, with normal appearance of pulmonary vasculature and no pleural effusion or pneumothorax.    The cardiac silhouette is normal in size. The hilar and mediastinal contours are unremarkable.    Bones are intact. Resolution is somewhat limited by body habitus with underpenetration.                              X-Rays:   Independently Interpreted Readings:   Chest X-Ray: Normal heart size.  No infiltrates.  No acute abnormalities.     Medical Decision Making:   Initial Assessment:     Urgent evaluation a 34-year-old female presenting to the emergency department with complaints of flu-like symptoms.  Patient is afebrile, nontoxic appearing, hemodynamically stable. Physical exam reveals regular rate rhythm, lungs are clear to auscultation bilaterally.  Boggy nasal mucosa. No posterior oropharyngeal edema or erythema.  Will plan for rapid influenza, chest x-ray, analgesics and reassess.    Independently Interpreted Test(s):   I have ordered and independently interpreted X-rays - see prior notes.  Clinical Tests:   Lab Tests: Reviewed and Ordered  Radiological Study: Ordered and Reviewed  ED Management:    Rapid influenza is negative.  Chest x-ray is negative for pneumonia or acute process.  Given patient's history and physical  exam findings, signs symptoms most consistent with a viral etiology.  Will plan to treat symptomatically with Tessalon Perles, Flonase, and ibuprofen.  Counseled home care treatment.  Discharged stable condition.The patient was instructed to follow up with a primary care provider in 2 days or to return to the emergency department for worsening symptoms. The treatment plan was discussed with the patient who demonstrated understanding and comfort with plan.    This note was created using [employer] Fluency Direct. There may be typographical errors secondary to dictation.                                    Clinical Impression:     1. Viral syndrome    2. Cough         Disposition:   Disposition: Discharged  Condition: Stable                     Tonia Fu PA-C  03/02/20 0168

## 2020-07-20 ENCOUNTER — TELEPHONE (OUTPATIENT)
Dept: OBSTETRICS AND GYNECOLOGY | Facility: CLINIC | Age: 35
End: 2020-07-20

## 2020-07-20 DIAGNOSIS — Z34.90 PREGNANCY, UNSPECIFIED GESTATIONAL AGE: Primary | ICD-10-CM

## 2020-07-20 NOTE — TELEPHONE ENCOUNTER
----- Message from Laquita Magana MA sent at 7/20/2020  2:55 PM CDT -----  Regarding: FW: New OB  YES, Please schedule patient.   ----- Message -----  From: Johanne Huang  Sent: 7/20/2020  12:38 PM CDT  To: [facility] Staff  Subject: FW: New OB                                       Will dr saab see this patient for her pregnancy?  ----- Message -----  From: Madhavi Mckeon  Sent: 7/20/2020   9:48 AM CDT  To: Mary Atwood  Subject: New OB                                           Who Called: LEFTY MAGANA [[id number]]    Date of Positive Preg Test: 7/20    1st day of Last Menstrual Cycle: 6/14     Preferred Physician: Dr. Saab      List Any Difficulties: N/a      What Number to Call Back:  [phone]    Can the clinic reply in [facility]:

## 2020-07-22 ENCOUNTER — HOSPITAL ENCOUNTER (EMERGENCY)
Facility: OTHER | Age: 35
Discharge: HOME | End: 2020-07-22
Attending: EMERGENCY MEDICINE
Payer: COMMERCIAL

## 2020-07-22 VITALS
DIASTOLIC BLOOD PRESSURE: 94 MMHG | TEMPERATURE: 98 F | BODY MASS INDEX: 34.15 KG/M2 | HEART RATE: 80 BPM | SYSTOLIC BLOOD PRESSURE: 174 MMHG | WEIGHT: 200 LBS | RESPIRATION RATE: 16 BRPM | OXYGEN SATURATION: 99 % | HEIGHT: 64 IN

## 2020-07-22 DIAGNOSIS — R10.9 ABDOMINAL PAIN DURING PREGNANCY IN FIRST TRIMESTER: ICD-10-CM

## 2020-07-22 DIAGNOSIS — O26.891 ABDOMINAL PAIN DURING PREGNANCY IN FIRST TRIMESTER: ICD-10-CM

## 2020-07-22 DIAGNOSIS — O20.0 THREATENED MISCARRIAGE IN EARLY PREGNANCY: ICD-10-CM

## 2020-07-22 DIAGNOSIS — R10.31 RLQ ABDOMINAL PAIN: Primary | ICD-10-CM

## 2020-07-22 LAB
ABO + RH BLD: NORMAL
B-HCG UR QL: POSITIVE
BACTERIA GENITAL QL WET PREP: ABNORMAL
BASOPHILS # BLD AUTO: 0.02 K/UL (ref 0–0.2)
BASOPHILS NFR BLD: 0.3 % (ref 0–1.9)
BILIRUB UR QL STRIP: NEGATIVE
CLARITY UR: CLEAR
CLUE CELLS VAG QL WET PREP: ABNORMAL
COLOR UR: YELLOW
CTP QC/QA: YES
DIFFERENTIAL METHOD: ABNORMAL
EOSINOPHIL # BLD AUTO: 0 K/UL (ref 0–0.5)
EOSINOPHIL NFR BLD: 0.5 % (ref 0–8)
ERYTHROCYTE [DISTWIDTH] IN BLOOD BY AUTOMATED COUNT: 16.1 % (ref 11.5–14.5)
FILAMENT FUNGI VAG WET PREP-#/AREA: ABNORMAL
GLUCOSE UR QL STRIP: NEGATIVE
HCG INTACT+B SERPL-ACNC: 4359 MIU/ML
HCG INTACT+B SERPL-ACNC: 4510 MIU/ML
HCT VFR BLD AUTO: 31.7 % (ref 37–48.5)
HGB BLD-MCNC: 10 G/DL (ref 12–16)
HGB UR QL STRIP: NEGATIVE
IMM GRANULOCYTES # BLD AUTO: 0.02 K/UL (ref 0–0.04)
IMM GRANULOCYTES NFR BLD AUTO: 0.3 % (ref 0–0.5)
KETONES UR QL STRIP: NEGATIVE
LEUKOCYTE ESTERASE UR QL STRIP: NEGATIVE
LYMPHOCYTES # BLD AUTO: 2 K/UL (ref 1–4.8)
LYMPHOCYTES NFR BLD: 26.7 % (ref 18–48)
MCH RBC QN AUTO: 24.6 PG (ref 27–31)
MCHC RBC AUTO-ENTMCNC: 31.5 G/DL (ref 32–36)
MCV RBC AUTO: 78 FL (ref 82–98)
MONOCYTES # BLD AUTO: 0.5 K/UL (ref 0.3–1)
MONOCYTES NFR BLD: 6 % (ref 4–15)
NEUTROPHILS # BLD AUTO: 4.9 K/UL (ref 1.8–7.7)
NEUTROPHILS NFR BLD: 66.2 % (ref 38–73)
NITRITE UR QL STRIP: NEGATIVE
NRBC BLD-RTO: 0 /100 WBC
PH UR STRIP: 6 [PH] (ref 5–8)
PLATELET # BLD AUTO: 291 K/UL (ref 150–350)
PMV BLD AUTO: 9.8 FL (ref 9.2–12.9)
PROT UR QL STRIP: NEGATIVE
RBC # BLD AUTO: 4.06 M/UL (ref 4–5.4)
SP GR UR STRIP: 1.02 (ref 1–1.03)
SPECIMEN SOURCE: ABNORMAL
T VAGINALIS GENITAL QL WET PREP: ABNORMAL
URN SPEC COLLECT METH UR: NORMAL
UROBILINOGEN UR STRIP-ACNC: NEGATIVE EU/DL
WBC # BLD AUTO: 7.45 K/UL (ref 3.9–12.7)
WBC #/AREA VAG WET PREP: ABNORMAL
YEAST GENITAL QL WET PREP: ABNORMAL

## 2020-07-22 PROCEDURE — 85025 COMPLETE CBC W/AUTO DIFF WBC: CPT

## 2020-07-22 PROCEDURE — 87491 CHLMYD TRACH DNA AMP PROBE: CPT

## 2020-07-22 PROCEDURE — 86901 BLOOD TYPING SEROLOGIC RH(D): CPT

## 2020-07-22 PROCEDURE — 87210 SMEAR WET MOUNT SALINE/INK: CPT

## 2020-07-22 PROCEDURE — 84702 CHORIONIC GONADOTROPIN TEST: CPT | Mod: 91

## 2020-07-22 PROCEDURE — 81003 URINALYSIS AUTO W/O SCOPE: CPT

## 2020-07-22 PROCEDURE — 99284 EMERGENCY DEPT VISIT MOD MDM: CPT | Mod: 25

## 2020-07-22 PROCEDURE — 81025 URINE PREGNANCY TEST: CPT | Performed by: NURSE PRACTITIONER

## 2020-07-22 RX ORDER — METOCLOPRAMIDE 10 MG/1
10 TABLET ORAL EVERY 6 HOURS PRN
Qty: 10 TABLET | Refills: 0 | Status: ON HOLD | OUTPATIENT
Start: 2020-07-22 | End: 2021-03-13 | Stop reason: HOSPADM

## 2020-07-22 RX ORDER — ACETAMINOPHEN 325 MG/1
650 TABLET ORAL EVERY 6 HOURS PRN
Qty: 30 TABLET | Refills: 0 | Status: ON HOLD | OUTPATIENT
Start: 2020-07-22 | End: 2021-03-13 | Stop reason: HOSPADM

## 2020-07-22 NOTE — Clinical Note
Azucena Magana was seen and treated in our emergency department on 7/22/2020.  She may return to work on 07/24/2020.       If you have any questions or concerns, please don't hesitate to call.      Ulices Conrad RN RN

## 2020-07-22 NOTE — PROVIDER PROGRESS NOTES - EMERGENCY DEPT.
Emergency Department TeleTRIAGE Encounter Note      CHIEF COMPLAINT    Chief Complaint   Patient presents with    Abdominal Pain     x 2 days RLQ radiating to the R flank       VITAL SIGNS   Initial Vitals [07/22/20 1707]   BP Pulse Resp Temp SpO2   (!) 174/94 98 16 98.4 °F (36.9 °C) --      MAP       --            ALLERGIES    Review of patient's allergies indicates:  No Known Allergies    PROVIDER TRIAGE NOTE  This is a teletriage evaluation of a 35 y.o. female presenting to the ED with c/o right flank/ right lower abdominal pain x 2 days. No N/V/D. No fevers.  Initial orders will be placed and care will be transferred to an alternate provider when patient is roomed for a full evaluation. Any additional orders and the final disposition will be determined by that provider.         ORDERS  Labs Reviewed   URINALYSIS, REFLEX TO URINE CULTURE   POCT URINE PREGNANCY       ED Orders (720h ago, onward)    Start Ordered     Status Ordering Provider    07/22/20 1714 07/22/20 1713  POCT urine pregnancy  Once      Ordered JUD PRADO    07/22/20 1714 07/22/20 1713  Urinalysis, Reflex to Urine Culture Urine, Clean Catch  STAT      Ordered JUD PRADO            Virtual Visit Note: The provider triage portion of this emergency department evaluation and documentation was performed via [employer], a HIPAA-compliant telemedicine application, in concert with a tele-presenter in the room. A face to face patient evaluation with one of my colleagues will occur once the patient is placed in an emergency department room.      DISCLAIMER: This note was prepared with [employer] voice recognition transcription software. Garbled syntax, mangled pronouns, and other bizarre constructions may be attributed to that software system.

## 2020-07-23 ENCOUNTER — TELEPHONE (OUTPATIENT)
Dept: OBSTETRICS AND GYNECOLOGY | Facility: CLINIC | Age: 35
End: 2020-07-23

## 2020-07-23 NOTE — ED TRIAGE NOTES
Pt came to ED c/o RLQ pian x 2 days. Pt reports taking home upt but wsa negitive. Pt denies n/v/d/, vaginal bleeding, and vaginal discharge.

## 2020-07-23 NOTE — TELEPHONE ENCOUNTER
----- Message from Thony Gray sent at 7/23/2020 11:51 AM CDT -----  Regarding: Appointment  Contact: LEFTY REDMOND [[id number]]  Name of Who is Calling: LEFTY REDMOND [[id number]]      What is the request in detail: Would like to speak with staff in regards to an appointment for an ER f/u for possible ectopic pregnancy. Please advise      Can the clinic reply by [facility]: no      What Number to Call Back if not in [facility]: [phone]

## 2020-07-23 NOTE — ED PROVIDER NOTES
Encounter Date: 2020       History     Chief Complaint   Patient presents with    Abdominal Pain     x 2 days RLQ radiating to the R flank     Patient presents complaining of pain in the right lower quadrant for the past 2 days.  States that it feels like when I had a tubal pregnancy in 2015 period she had last menses around , states he was longer than normal but is frequently irregular if she is inconsistent with birth control pills she has been very inconsistent with him over the past month due to change in routine as her mom had COVID infection.  She herself did not have any fever cough shortness of breath to suggest COVID infection she is not having any current vaginal bleeding where she have any spotting, discharge dysuria or other pelvic complaints.  She has had 2 prior normal pregnancies, the 1 ectopic pregnancy, no other miscarriage/.  Reports a history of fibroids.  No other acute complaints        Review of patient's allergies indicates:  No Known Allergies  Past Medical History:   Diagnosis Date    Abnormal Pap smear of cervix     Hypertension     Migraine headache     Obesity      Past Surgical History:   Procedure Laterality Date    WY TREAT ECTOPIC PREG,NON REMVAL Right 7/12/15    Right salpingectomy and partial cornual resection     Family History   Problem Relation Age of Onset    Ovarian cancer Maternal Grandmother     Colon cancer Neg Hx     Breast cancer Neg Hx      Social History     Tobacco Use    Smoking status: Never Smoker    Smokeless tobacco: Never Used   Substance Use Topics    Alcohol use: No    Drug use: No     Review of Systems   Constitutional: Negative.    HENT: Negative.    Respiratory: Negative.    Cardiovascular: Negative.    Gastrointestinal: Positive for abdominal pain. Negative for abdominal distention, blood in stool, constipation, diarrhea, nausea and vomiting.   Genitourinary: Positive for pelvic pain. Negative for difficulty urinating,  dysuria, flank pain, frequency, genital sores, hematuria, vaginal bleeding, vaginal discharge and vaginal pain.   Musculoskeletal: Negative.    Skin: Negative.    Neurological: Negative.    Hematological: Negative.        Physical Exam     Initial Vitals [07/22/20 1707]   BP Pulse Resp Temp SpO2   (!) 174/94 98 16 98.4 °F (36.9 °C) 98 %      MAP       --         Physical Exam    Nursing note and vitals reviewed.  Constitutional: She appears well-developed.   Obese.  Comfortable appearing.  No distress.   HENT:   Head: Normocephalic and atraumatic.   No pallor or icterus   Eyes: Conjunctivae are normal. Pupils are equal, round, and reactive to light.   Neck: Normal range of motion. Neck supple.   Cardiovascular: Normal rate, regular rhythm, normal heart sounds and intact distal pulses.   Pulmonary/Chest: Breath sounds normal. She has no wheezes.   Abdominal: Soft. Bowel sounds are normal. She exhibits no distension. There is no rebound and no guarding.   Tenderness in the right lower quadrant, without rebound or guarding.  Uterus not palpable above the pelvic brim   Genitourinary:    Vagina and uterus normal.      No vaginal discharge.      Genitourinary Comments: Cervix closed.  No CMT.  Tenderness in the region of the right adnexa, no mass appreciated but limited by habitus.     Neurological: She is alert and oriented to person, place, and time.   Skin: Skin is warm and dry. No rash noted.   Psychiatric: She has a normal mood and affect. Thought content normal.         ED Course   Procedures  Labs Reviewed   VAGINAL SCREEN - Abnormal; Notable for the following components:       Result Value    WBC - Vaginal Screen Rare (*)     Bacteria - Vaginal Screen Rare (*)     All other components within normal limits   CBC W/ AUTO DIFFERENTIAL - Abnormal; Notable for the following components:    Hemoglobin 10.0 (*)     Hematocrit 31.7 (*)     Mean Corpuscular Volume 78 (*)     Mean Corpuscular Hemoglobin 24.6 (*)     Mean  Corpuscular Hemoglobin Conc 31.5 (*)     RDW 16.1 (*)     All other components within normal limits   POCT URINE PREGNANCY - Abnormal; Notable for the following components:    POC Preg Test, Ur Positive (*)     All other components within normal limits   C. TRACHOMATIS/N. GONORRHOEAE BY AMP DNA   URINALYSIS, REFLEX TO URINE CULTURE    Narrative:     Specimen Source->Urine   HCG, QUANTITATIVE, PREGNANCY   HCG, QUANTITATIVE, PREGNANCY   GROUP & RH          Imaging Results          US OB <14 Wks TransAbd & TransVag, Single Gestation (XPD) (Final result)  Result time 07/22/20 21:07:32    Final result by Olive Rios MD (07/22/20 21:07:32)                 Impression:      Very early IUP with mean sac diameter corresponding to a gestational age of 5 weeks 1 day.  No fetal pole or yolk sac at this time.  Estimated due date by ultrasound is 03/23/2021.    Left corpus luteum.      Electronically signed by: Olive Rios  Date:    07/22/2020  Time:    21:07             Narrative:    EXAMINATION:  ULTRASOUND OBSTETRICAL ULTRASOUND LESS THAN 14 WEEKS WITH TRANSVAGINAL    CLINICAL HISTORY:  Vag Bleeding;    TECHNIQUE:  Real-time ultrasound obstetrical ultrasound less than 14 weeks was performed transabdominally and  transvaginally.    COMPARISON:  None.    FINDINGS:  The uterus measures 9.6 x 6 x 7.1 cm. There are no uterine masses.  No fetal pole or yolk sac is detected at this time.  Nabothian cysts are seen at the lower uterine segment.    The right ovary is not visualized.  The left ovary measures 2.3 x 3.4 x 3.5 cm.  There is a left corpus luteum measuring 2.4 x 1.4 x 1.7 cm.    There is a tiny amount of free fluid in the cul-de-sac.                                                   ED Course as of Jul 23 1206   Wed Jul 22, 2020   [address] [place] of Care Note:  Discussed pt and plan with prior team.   I independently interpreted and reviewed the patient's ultrasound, notable for no definitive IUP, presumed  gestational sac is identified which by size would be consistent with 5 week 1 day gestation.  I independently reviewed and interpreted labs which are notable for Beta HCG 4510.          [RC]   2153 Patient history, findings, results discussed with OBGYN resident Dr. Mtz who will message OBGYN clinic to facilitate f/u.          [RC]      ED Course User Index  [RC] Manjinder Sotelo MD     Patient with a history of prior ectopic pregnancy, inconsistent use birth control pills presents with right lower quadrant pain x2 days.  UPT is positive.  Exam she has tenderness in the right lower quadrant and the right adnexal region.  Concern is to rule out another ectopic pregnancy.  Will send laboratory studies including beta HCG to help correlate how far along she is.  Pelvic ultrasound to evaluate for IUP versus ectopic.  Care is turned over at 8:00 p.m. pending these results and re-evaluation           Clinical Impression:     1. RLQ abdominal pain    2. Abdominal pain during pregnancy in first trimester    3. Threatened miscarriage in early pregnancy                ED Disposition Condition    Discharge Good        ED Prescriptions     Medication Sig Dispense Start Date End Date Auth. Provider    metoclopramide HCl (REGLAN) 10 MG tablet Take 1 tablet (10 mg total) by mouth every 6 (six) hours as needed (headache, nausea or vomiting). 10 tablet 7/22/2020  Manjinder Sotelo MD    acetaminophen (TYLENOL) 325 MG tablet Take 2 tablets (650 mg total) by mouth every 6 (six) hours as needed for Pain or Temperature greater than (100.3). 30 tablet 7/22/2020  Manjinder Sotelo MD        Follow-up Information     Follow up With Specialties Details Why Contact Info    Yovanny Lott Jr., MD Family Medicine  For recheck with your primary care doctor [address] [facility] [address]  [facility] [address]  [phone]      Joi Najera MD Obstetrics, Obstetrics and Gynecology  For recheck with specialist  [address] [facility] [address]  [facility] [address]  [phone]      [facility] Obstetrics and Gynecology   [facility] [place] [id number] [phone]                                     Roby Salas II, MD  07/22/20 1955       Roby Salas II, MD  07/23/20 1206

## 2020-07-23 NOTE — ED NOTES
Pt rounding complete.  Respirations even and unlabored, in no acute distress.  Call light within reach.  Will continue to monitor.

## 2020-07-28 LAB
C TRACH DNA SPEC QL NAA+PROBE: NOT DETECTED
N GONORRHOEA DNA SPEC QL NAA+PROBE: NOT DETECTED

## 2020-07-29 ENCOUNTER — OFFICE VISIT (OUTPATIENT)
Dept: OBSTETRICS AND GYNECOLOGY | Facility: CLINIC | Age: 35
End: 2020-07-29
Attending: OBSTETRICS & GYNECOLOGY
Payer: COMMERCIAL

## 2020-07-29 ENCOUNTER — HOSPITAL ENCOUNTER (OUTPATIENT)
Dept: RADIOLOGY | Facility: OTHER | Age: 35
Discharge: HOME | End: 2020-07-29
Attending: OBSTETRICS & GYNECOLOGY
Payer: COMMERCIAL

## 2020-07-29 VITALS
HEIGHT: 62 IN | DIASTOLIC BLOOD PRESSURE: 78 MMHG | BODY MASS INDEX: 42.44 KG/M2 | WEIGHT: 230.63 LBS | SYSTOLIC BLOOD PRESSURE: 128 MMHG

## 2020-07-29 DIAGNOSIS — O36.80X0 PREGNANCY OF UNKNOWN ANATOMIC LOCATION: ICD-10-CM

## 2020-07-29 DIAGNOSIS — O36.80X0 PREGNANCY OF UNKNOWN ANATOMIC LOCATION: Primary | ICD-10-CM

## 2020-07-29 PROCEDURE — 76801 OB US < 14 WKS SINGLE FETUS: CPT | Mod: 26,,, | Performed by: RADIOLOGY

## 2020-07-29 PROCEDURE — 76817 TRANSVAGINAL US OBSTETRIC: CPT | Mod: 26,,, | Performed by: RADIOLOGY

## 2020-07-29 PROCEDURE — 99999 PR PBB SHADOW E&M-EST. PATIENT-LVL III: CPT | Mod: PBBFAC,,, | Performed by: OBSTETRICS & GYNECOLOGY

## 2020-07-29 PROCEDURE — 76817 US OB <14 WEEKS, TRANSABDOM & TRANSVAG, SINGLE GESTATION (XPD): ICD-10-PCS | Mod: 26,,, | Performed by: RADIOLOGY

## 2020-07-29 PROCEDURE — 99214 OFFICE O/P EST MOD 30 MIN: CPT | Mod: S$GLB,,, | Performed by: OBSTETRICS & GYNECOLOGY

## 2020-07-29 PROCEDURE — 76801 US OB <14 WEEKS, TRANSABDOM & TRANSVAG, SINGLE GESTATION (XPD): ICD-10-PCS | Mod: 26,,, | Performed by: RADIOLOGY

## 2020-07-29 PROCEDURE — 99214 PR OFFICE/OUTPT VISIT, EST, LEVL IV, 30-39 MIN: ICD-10-PCS | Mod: S$GLB,,, | Performed by: OBSTETRICS & GYNECOLOGY

## 2020-07-29 PROCEDURE — 76801 OB US < 14 WKS SINGLE FETUS: CPT | Mod: TC

## 2020-07-29 PROCEDURE — 99999 PR PBB SHADOW E&M-EST. PATIENT-LVL III: ICD-10-PCS | Mod: PBBFAC,,, | Performed by: OBSTETRICS & GYNECOLOGY

## 2020-07-29 RX ORDER — ACETAMINOPHEN AND CODEINE PHOSPHATE 120; 12 MG/5ML; MG/5ML
SOLUTION ORAL
COMMUNITY
Start: 2020-07-16 | End: 2020-09-14

## 2020-07-29 NOTE — LETTER
July 29, 2020    Azucena Magana  [address]  [facility] [address]         [facility] [address]  [address] [facility] [address]  [facility] [address]  Phone: [phone]  Fax: [phone] July 29, 2020     Patient: Azucena aMgana   YOB: 1985   Date of Visit: 7/29/2020       To Whom It May Concern:    It is my medical opinion that Azucena Magana should remain out of work until 8/03/2020.    If you have any questions or concerns, please don't hesitate to call.    Sincerely,        Henry Aguayo Jr., MD

## 2020-07-29 NOTE — PROGRESS NOTES
07/29/2020 U/S  Intrauterine gestation(s): Single  Mean gestational sac diameter: 1.7 cm  Yolk sac: Present  Crown-rump length (CRL): 0.28 cm  Crdiac activity: Not confidently identified  Subchorionic hemorrhage: None.  Right ovary: Normal.  Left ovary: Normal.  Miscellaneous: No Free Fluid.  There is a 2.6 cm fibroid identified in the uterine fundus.  Impression:  Single intrauterine pregnancy with estimated gestational age 6 weeks 0 days. and an ROSMERY of 03/24/2021.  A fetal pole and yolk sac are identified in today's exam.  No definite fetal heart tones identified.  This may be due to early stage of gestation.  Recommend continued follow-up and correlation with beta HCG..     PT SEEN IN ED LAST WEEK WITH + UPT AND RLQ PAIN.  NO BLEEDING BUT CONT RLQ PAIN. HAS HX ECTOPIC WITH R SALPINGECTOMY     7/22/2020    Hemoglobin 10.0 (L)   Hematocrit 31.7 (L)   hCG Quant 4510   Group & Rh A POS     07/22/2020 U/S  The uterus measures 9.6 x 6 x 7.1 cm. There are no uterine masses.  No fetal pole or yolk sac is detected at this time.  Nabothian cysts are seen at the lower uterine segment.  The right ovary is not visualized.  The left ovary measures 2.3 x 3.4 x 3.5 cm.  There is a left corpus luteum measuring 2.4 x 1.4 x 1.7 cm.  There is a tiny amount of free fluid in the cul-de-sac.  Impression:  Very early IUP with mean sac diameter corresponding to a gestational age of 5 weeks 1 day.  No fetal pole or yolk sac at this time.  Estimated due date by ultrasound is 03/23/2021.  Left corpus luteum.    ROS:  GENERAL: No fever, chills, fatigability or weight loss.  VULVAR: No pain, no lesions and no itching.  VAGINAL: No relaxation, no itching, no discharge, no abnormal bleeding and no lesions.  ABDOMEN: No abdominal pain. Denies nausea. Denies vomiting. No diarrhea. No constipation  BREAST: Denies pain. No lumps. No discharge.  URINARY: No incontinence, no nocturia, no frequency and no dysuria.  CARDIOVASCULAR: No chest pain. No  shortness of breath. No leg cramps.  NEUROLOGICAL: No headaches. No vision changes.  The remainder of the review of systems was negative.    PE:  General Appearance: obese And Well developed. Well nourished. In no acute distress.  Vulva: Lesions: No.  Urethral Meatus: Normal size. Normal location. No lesions. No prolapse.  Urethra: No masses. No tenderness. No prolapse. No scarring.  Bladder: No masses. No tenderness.  Vagina: Mucosa NI:yes discharge no, atrophy no, cystocele no or rectocele no.  Cervix: Lesion: no  Stenotic: no Cervical motion tenderness: no  Uterus: Uterus size: 7 weeks. Support good. Uterus size: Normal  Adnexa: Masses: No Tenderness: No CDS Nodularity: No  Abdomen: obese No masses. No tenderness.  CHEST: CTA B  HEART: RRR  EXT: NO EDEMA      PROCEDURES:    PLAN:     DIAGNOSIS:  1. Pregnancy of unknown anatomic location        MEDICATIONS & ORDERS:  Orders Placed This Encounter    hCG, quantitative    Progesterone    POCT urine pregnancy       FOLLOW-UP: With DR BYRNE. HAS U/S

## 2020-07-31 ENCOUNTER — TELEPHONE (OUTPATIENT)
Dept: OBSTETRICS AND GYNECOLOGY | Facility: CLINIC | Age: 35
End: 2020-07-31

## 2020-07-31 DIAGNOSIS — O36.80X0 PREGNANCY WITH INCONCLUSIVE FETAL VIABILITY, SINGLE OR UNSPECIFIED FETUS: Primary | ICD-10-CM

## 2020-07-31 NOTE — PROGRESS NOTES
BH[facility] CONT TO RISE AT LAST VISIT AND DUE TO GESTATIONAL AGE MAY NOT HAVE BEEN ABLE TO SEE FETAL POLE AND FCA - WILL REPEAT PELVIS USG 1 WEEK

## 2020-07-31 NOTE — TELEPHONE ENCOUNTER
Informed pt that her u/s read that she does have a yolk sac and fetal pole. Scheduled her for a repeat per Dr. Najera to see if there is a definite heart beat. Pt verbalized her understanding

## 2020-07-31 NOTE — Clinical Note
I DON'T SEE ANOTHER USG SCHED FOR HER, BUT NEED TO CONFIRM [facility] SOME TIME NEXT WEEK - WOULD YOU PLS SCHEDULE IT?

## 2020-07-31 NOTE — TELEPHONE ENCOUNTER
----- Message from Carlos Coffman sent at 7/31/2020 10:20 AM CDT -----   Name of Who is Calling:     What is the request in detail: patient request call back   NFI  Please contact to further discuss and advise      Can the clinic reply by [facility]: no     What Number to Call Back if not in [facility]:  [phone]

## 2020-08-03 ENCOUNTER — HOSPITAL ENCOUNTER (EMERGENCY)
Facility: OTHER | Age: 35
Discharge: HOME | End: 2020-08-03
Attending: EMERGENCY MEDICINE
Payer: COMMERCIAL

## 2020-08-03 VITALS
BODY MASS INDEX: 40.3 KG/M2 | RESPIRATION RATE: 16 BRPM | DIASTOLIC BLOOD PRESSURE: 90 MMHG | SYSTOLIC BLOOD PRESSURE: 143 MMHG | HEIGHT: 62 IN | WEIGHT: 219 LBS | HEART RATE: 70 BPM | OXYGEN SATURATION: 100 % | TEMPERATURE: 99 F

## 2020-08-03 DIAGNOSIS — O26.891 ABDOMINAL PAIN DURING PREGNANCY IN FIRST TRIMESTER: Primary | ICD-10-CM

## 2020-08-03 DIAGNOSIS — R10.9 ABDOMINAL PAIN DURING PREGNANCY IN FIRST TRIMESTER: Primary | ICD-10-CM

## 2020-08-03 LAB
ALBUMIN SERPL BCP-MCNC: 3.3 G/DL (ref 3.5–5.2)
ALP SERPL-CCNC: 76 U/L (ref 55–135)
ALT SERPL W/O P-5'-P-CCNC: 12 U/L (ref 10–44)
ANION GAP SERPL CALC-SCNC: 8 MMOL/L (ref 8–16)
AST SERPL-CCNC: 13 U/L (ref 10–40)
B-HCG UR QL: POSITIVE
BACTERIA GENITAL QL WET PREP: ABNORMAL
BASOPHILS # BLD AUTO: 0.03 K/UL (ref 0–0.2)
BASOPHILS NFR BLD: 0.4 % (ref 0–1.9)
BILIRUB SERPL-MCNC: 0.2 MG/DL (ref 0.1–1)
BILIRUB UR QL STRIP: NEGATIVE
BUN SERPL-MCNC: 9 MG/DL (ref 6–20)
CALCIUM SERPL-MCNC: 9.2 MG/DL (ref 8.7–10.5)
CHLORIDE SERPL-SCNC: 105 MMOL/L (ref 95–110)
CLARITY UR: CLEAR
CLUE CELLS VAG QL WET PREP: ABNORMAL
CO2 SERPL-SCNC: 24 MMOL/L (ref 23–29)
COLOR UR: YELLOW
CREAT SERPL-MCNC: 0.8 MG/DL (ref 0.5–1.4)
CTP QC/QA: YES
DIFFERENTIAL METHOD: ABNORMAL
EOSINOPHIL # BLD AUTO: 0.1 K/UL (ref 0–0.5)
EOSINOPHIL NFR BLD: 0.7 % (ref 0–8)
ERYTHROCYTE [DISTWIDTH] IN BLOOD BY AUTOMATED COUNT: 15.9 % (ref 11.5–14.5)
EST. GFR  (AFRICAN AMERICAN): >60 ML/MIN/1.73 M^2
EST. GFR  (NON AFRICAN AMERICAN): >60 ML/MIN/1.73 M^2
FILAMENT FUNGI VAG WET PREP-#/AREA: ABNORMAL
GLUCOSE SERPL-MCNC: 82 MG/DL (ref 70–110)
GLUCOSE UR QL STRIP: NEGATIVE
HCG INTACT+B SERPL-ACNC: NORMAL MIU/ML
HCT VFR BLD AUTO: 32.5 % (ref 37–48.5)
HGB BLD-MCNC: 10.4 G/DL (ref 12–16)
HGB UR QL STRIP: NEGATIVE
IMM GRANULOCYTES # BLD AUTO: 0.02 K/UL (ref 0–0.04)
IMM GRANULOCYTES NFR BLD AUTO: 0.2 % (ref 0–0.5)
KETONES UR QL STRIP: NEGATIVE
LEUKOCYTE ESTERASE UR QL STRIP: NEGATIVE
LYMPHOCYTES # BLD AUTO: 1.7 K/UL (ref 1–4.8)
LYMPHOCYTES NFR BLD: 21.3 % (ref 18–48)
MCH RBC QN AUTO: 24.6 PG (ref 27–31)
MCHC RBC AUTO-ENTMCNC: 32 G/DL (ref 32–36)
MCV RBC AUTO: 77 FL (ref 82–98)
MONOCYTES # BLD AUTO: 0.7 K/UL (ref 0.3–1)
MONOCYTES NFR BLD: 9 % (ref 4–15)
NEUTROPHILS # BLD AUTO: 5.5 K/UL (ref 1.8–7.7)
NEUTROPHILS NFR BLD: 68.4 % (ref 38–73)
NITRITE UR QL STRIP: NEGATIVE
NRBC BLD-RTO: 0 /100 WBC
PH UR STRIP: 7 [PH] (ref 5–8)
PLATELET # BLD AUTO: 260 K/UL (ref 150–350)
PMV BLD AUTO: 10.1 FL (ref 9.2–12.9)
POTASSIUM SERPL-SCNC: 3.9 MMOL/L (ref 3.5–5.1)
PROT SERPL-MCNC: 7.6 G/DL (ref 6–8.4)
PROT UR QL STRIP: NEGATIVE
RBC # BLD AUTO: 4.23 M/UL (ref 4–5.4)
SODIUM SERPL-SCNC: 137 MMOL/L (ref 136–145)
SP GR UR STRIP: 1.01 (ref 1–1.03)
SPECIMEN SOURCE: ABNORMAL
T VAGINALIS GENITAL QL WET PREP: ABNORMAL
URN SPEC COLLECT METH UR: NORMAL
UROBILINOGEN UR STRIP-ACNC: NEGATIVE EU/DL
WBC # BLD AUTO: 8.07 K/UL (ref 3.9–12.7)
WBC #/AREA VAG WET PREP: ABNORMAL
YEAST GENITAL QL WET PREP: ABNORMAL

## 2020-08-03 PROCEDURE — 96374 THER/PROPH/DIAG INJ IV PUSH: CPT

## 2020-08-03 PROCEDURE — 63600175 PHARM REV CODE 636 W HCPCS: Performed by: NURSE PRACTITIONER

## 2020-08-03 PROCEDURE — 84702 CHORIONIC GONADOTROPIN TEST: CPT

## 2020-08-03 PROCEDURE — 85025 COMPLETE CBC W/AUTO DIFF WBC: CPT

## 2020-08-03 PROCEDURE — 99285 EMERGENCY DEPT VISIT HI MDM: CPT | Mod: 25

## 2020-08-03 PROCEDURE — 87491 CHLMYD TRACH DNA AMP PROBE: CPT

## 2020-08-03 PROCEDURE — 81003 URINALYSIS AUTO W/O SCOPE: CPT

## 2020-08-03 PROCEDURE — 87210 SMEAR WET MOUNT SALINE/INK: CPT

## 2020-08-03 PROCEDURE — 81025 URINE PREGNANCY TEST: CPT | Performed by: NURSE PRACTITIONER

## 2020-08-03 PROCEDURE — 80053 COMPREHEN METABOLIC PANEL: CPT

## 2020-08-03 PROCEDURE — 96375 TX/PRO/DX INJ NEW DRUG ADDON: CPT

## 2020-08-03 RX ORDER — ONDANSETRON 4 MG/1
4 TABLET, ORALLY DISINTEGRATING ORAL EVERY 6 HOURS PRN
Qty: 20 TABLET | Refills: 0 | Status: SHIPPED | OUTPATIENT
Start: 2020-08-03 | End: 2020-08-07

## 2020-08-03 RX ORDER — MORPHINE SULFATE 10 MG/ML
4 INJECTION INTRAMUSCULAR; INTRAVENOUS; SUBCUTANEOUS
Status: COMPLETED | OUTPATIENT
Start: 2020-08-03 | End: 2020-08-03

## 2020-08-03 RX ORDER — ONDANSETRON 2 MG/ML
4 INJECTION INTRAMUSCULAR; INTRAVENOUS
Status: COMPLETED | OUTPATIENT
Start: 2020-08-03 | End: 2020-08-03

## 2020-08-03 RX ADMIN — MORPHINE SULFATE 4 MG: 10 INJECTION INTRAVENOUS at 03:08

## 2020-08-03 RX ADMIN — ONDANSETRON 4 MG: 2 INJECTION INTRAMUSCULAR; INTRAVENOUS at 03:08

## 2020-08-03 NOTE — DISCHARGE INSTRUCTIONS
Call and schedule an appointment with your OB for continued evaluation and treatment.  Please return to the ED at anytime if you symptoms worsen or you have any concerns.

## 2020-08-03 NOTE — ED PROVIDER NOTES
Encounter Date: 8/3/2020       History     Chief Complaint   Patient presents with    Abdominal Pain     + pregnant, reporting R sided lower abd pain x 2 weeks, worsening x last PM; denies vaginal bleeding.      This is the urgent evaluation a 35-year-old black female with past medical history includes ectopic pregnancy, hypertension and obesity, on the 22nd of last month she came to the emergency department with right-sided lower abdominal pain and had a positive pregnancy test emergency department.  Ultrasound performed that day showed IUP without fetal pole or gestational sac.  She subsequently followed up with OB and had a repeat ultrasound which did show an IUP with a fetal pole.  Her beta has also been trending upward. She presents back to emergency department today with worsening right-sided lower abdominal pain, patient describes pain is when she had a previous ectopic.  She denies any vaginal bleeding or spotting or any vaginal discharge.        Review of patient's allergies indicates:  No Known Allergies  Past Medical History:   Diagnosis Date    Abnormal Pap smear of cervix     Hypertension     Migraine headache     Obesity      Past Surgical History:   Procedure Laterality Date    ME TREAT ECTOPIC PREG,NON REMVAL Right 7/12/15    Right salpingectomy and partial cornual resection     Family History   Problem Relation Age of Onset    Ovarian cancer Maternal Grandmother     Colon cancer Neg Hx     Breast cancer Neg Hx      Social History     Tobacco Use    Smoking status: Never Smoker    Smokeless tobacco: Never Used   Substance Use Topics    Alcohol use: No    Drug use: No     Review of Systems   Constitutional: Negative for chills and fever.   Respiratory: Negative for cough and shortness of breath.    Cardiovascular: Negative for chest pain.   Gastrointestinal: Positive for abdominal pain. Negative for diarrhea, nausea and vomiting.   Genitourinary: Negative for dysuria, flank pain, vaginal  "bleeding, vaginal discharge and vaginal pain.   Musculoskeletal: Negative for back pain.   All other systems reviewed and are negative.      Physical Exam     Initial Vitals [08/03/20 1204]   BP Pulse Resp Temp SpO2   (!) 142/86 81 18 99.4 °F (37.4 °C) 97 %      MAP       --         Vitals:    08/03/20 1204 08/03/20 1534 08/03/20 1557 08/03/20 1601   BP: (!) 142/86  125/75 129/80   Pulse: 81  69 67   Resp: 18 16     Temp: 99.4 °F (37.4 °C)      TempSrc: Oral      SpO2: 97%  97% 100%   Weight: 99.3 kg (219 lb)      Height: 5' 2" (1.575 m)       08/03/20 1819   BP: (!) 143/90   Pulse: 70   Resp: 16   Temp: 98.9 °F (37.2 °C)   TempSrc: Oral   SpO2: 100%   Weight:    Height:        Physical Exam    Nursing note and vitals reviewed.  Constitutional: Vital signs are normal. She appears well-developed and well-nourished. She does not appear ill. No distress.   Neck: Neck supple.   Cardiovascular: Normal rate, regular rhythm, S1 normal, S2 normal and normal heart sounds.   Pulmonary/Chest: Effort normal and breath sounds normal. No tachypnea. She has no decreased breath sounds. She has no wheezes.   Abdominal: Soft. Bowel sounds are normal. There is abdominal tenderness in the right lower quadrant. There is no rigidity, no rebound, no guarding, no CVA tenderness, no tenderness at McBurney's point and negative Aguilera's sign.       Neurological: She is alert and oriented to person, place, and time. GCS eye subscore is 4. GCS verbal subscore is 5. GCS motor subscore is 6.   Skin: Skin is warm, dry and intact.   Psychiatric: She has a normal mood and affect. Her behavior is normal.         ED Course   Procedures  Labs Reviewed   CBC W/ AUTO DIFFERENTIAL - Abnormal; Notable for the following components:       Result Value    Hemoglobin 10.4 (*)     Hematocrit 32.5 (*)     Mean Corpuscular Volume 77 (*)     Mean Corpuscular Hemoglobin 24.6 (*)     RDW 15.9 (*)     All other components within normal limits   COMPREHENSIVE " METABOLIC PANEL - Abnormal; Notable for the following components:    Albumin 3.3 (*)     All other components within normal limits   VAGINAL SCREEN - Abnormal; Notable for the following components:    WBC - Vaginal Screen Rare (*)     Bacteria - Vaginal Screen Few (*)     All other components within normal limits   POCT URINE PREGNANCY - Abnormal; Notable for the following components:    POC Preg Test, Ur Positive (*)     All other components within normal limits   C. TRACHOMATIS/N. GONORRHOEAE BY AMP DNA   URINALYSIS, REFLEX TO URINE CULTURE    Narrative:     Specimen Source->Urine   HCG, QUANTITATIVE, PREGNANCY          Imaging Results          MRI Abdomen Without Contrast (Final result)  Result time 08/03/20 17:53:22   Procedure changed from MRI Abdomen Pelvis Without Contrast (XPD)     Final result by King Williamson MD (08/03/20 17:53:22)                 Impression:      1. No evidence of appendicitis.  2. Cholelithiasis.      Electronically signed by: King Williamson MD  Date:    08/03/2020  Time:    17:53             Narrative:    EXAMINATION:  MRI ABDOMEN WITHOUT CONTRAST    CLINICAL HISTORY:  RLQ abdominal pain, appendicitis suspected (pregnant);    TECHNIQUE:  Multiplanar, multisequence images of the abdomen were obtained without the use of IV contrast.    COMPARISON:  None    FINDINGS:  Suspected appendix is visualized and is normal in appearance.  No free fluid or focal fluid collection seen.  Intrauterine pregnancy is seen.  No evidence of bowel obstruction.  Liver shows no significant abnormalities.  Multiple prominent gallstones are seen.  Stomach, spleen, pancreas, and adrenal glands are unremarkable.  Kidneys show no hydronephrosis.  No ascites.                               US OB <14 Wks TransAbd & TransVag, Single Gestation (XPD) (Final result)  Result time 08/03/20 15:11:55    Final result by Timothy Desai MD (08/03/20 15:11:55)                 Impression:      Single live intrauterine  pregnancy with estimated gestational age 6 weeks 3 days. and an ROSMERY of 03/26/2021, demonstrating expected interval growth.      Electronically signed by: Timothy Desai MD  Date:    08/03/2020  Time:    15:11             Narrative:    EXAMINATION:  US OB <14 WEEKS, TRANSABDOM & TRANSVAG, SINGLE GESTATION (XPD)    CLINICAL HISTORY:  Vag Bleeding;    TECHNIQUE:  Transabdominal sonography of the pelvis was performed, followed by transvaginal sonography to better evaluate the uterus and ovaries.    COMPARISON:  Pelvic ultrasound most recent 07/29/2020    FINDINGS:  Uterus: 12.1 x 5.2 x 8 cm.  Grossly stable fibroid within the anterior aspect of the uterus.  Nabothian cysts noted.    Intrauterine gestation(s): Single    Mean gestational sac diameter: 2.1 cm    Yolk sac: 2 mm    Crown-rump length (CRL): 0.3 cm    Cardiac activity: 116 bpm    Subchorionic hemorrhage: None.    Right ovary: Normal.    Left ovary: Normal.    Miscellaneous: Trace nonspecific free fluid within the cul-de-sac                                 Medical Decision Making:   Differential Diagnosis:   Ectopic, appendicitis  Clinical Tests:   Lab Tests: Reviewed and Ordered  Radiological Study: Ordered and Reviewed  ED Management:  35-year-old black female presented with right-sided lower abdominal pain, patient has had 2 previous ultrasounds that were unable to confirm the location of her pregnancy.  She reports increasing worsening abdominal pain, ultrasound was obtained which confirmed an IUP with a fetal pole and heart rate.  Due to patient's persistent right-sided lower abdominal pain MRI was obtained to rule out appendicitis.  MRI was negative, patient to follow-up with her OBGYN for continued evaluation treatment for abdominal pain.              Attending Attestation:     Physician Attestation Statement for NP/PA:   I have conducted a face to face encounter with this patient in addition to the NP/PA, due to Medical Complexity    Other NP/PA  Attestation Additions:    History of Present Illness: 35-year-old female, at 6 weeks gestation here with persistence of right lower quadrant abdominal discomfort.  Previous ultrasounds had not confirmed fetal heart rate.  Ultrasound today confirming IUP with fetal heart tones.  Patient had persistence of right lower quadrant abdominal discomfort, that I would on going for few days in addition with some anorexia and poor appetite (difficult to distinguish from 1st trimester nausea) with concern for appendicitis, patient underwent an MRI with contrast, which fortunately was unrevealing of any acute process.  Specifically no evidence of appendicitis.  She was discharged, with close follow-up with her OB Gyne, and return precautions should anything change or worsen                   ED Course as of Aug 03 1841   Mon Aug 03, 2020   1338 Hemoglobin(!): 10.4 [MG]   1338 Hematocrit(!): 32.5 [MG]   1531 Went to re-evaluate patient, who is still having significant right lower quadrant abdominal discomfort.  Given that her ultrasound is confirming an intrauterine pregnancy, no free fluid, suspicion for appendicitis is higher.  Plan for MRI abdomen pelvis    [MG]   1746 Awaiting MRI results    [MG]      ED Course User Index  [MG] Marlen Perez MD                Clinical Impression:       ICD-10-CM ICD-9-CM   1. Abdominal pain during pregnancy in first trimester  O26.891 646.83    R10.9 789.00         Disposition:   Disposition: Discharged  Condition: Stable     ED Disposition Condition    Discharge Stable        ED Prescriptions     Medication Sig Dispense Start Date End Date Auth. Provider    ondansetron (ZOFRAN-ODT) 4 MG TbDL Take 1 tablet (4 mg total) by mouth every 6 (six) hours as needed. 20 tablet 8/3/2020 8/7/2020 JANNIE Parr        Follow-up Information     Follow up With Specialties Details Why Contact Info    Henry Aguayo Jr., MD Obstetrics, Obstetrics and Gynecology Schedule an appointment as soon as  possible for a visit   [address] [place] [id number]  [phone]                                       JANNIE Parr  08/03/20 1803       Marlen Perez MD  08/03/20 [id number]

## 2020-08-03 NOTE — Clinical Note
Azucena Magana was seen and treated in our emergency department on 8/3/2020.  She may return to work on 08/06/2020.       If you have any questions or concerns, please don't hesitate to call.      JANNIE Parr

## 2020-08-03 NOTE — ED TRIAGE NOTES
AAOx3, presented to the ED with RLQ abdominal pain. Pain 8/10, pressure feeling. Pt denies taking any medication for the pain. Pt report + pregnancy test. LMP: July, 13th. Pt admits to having nausea. Pt denies SOB, chest pain, V/D and/or fever/chills.

## 2020-08-07 ENCOUNTER — PROCEDURE VISIT (OUTPATIENT)
Dept: OBSTETRICS AND GYNECOLOGY | Facility: CLINIC | Age: 35
End: 2020-08-07
Payer: COMMERCIAL

## 2020-08-07 DIAGNOSIS — O36.80X0 PREGNANCY WITH INCONCLUSIVE FETAL VIABILITY, SINGLE OR UNSPECIFIED FETUS: ICD-10-CM

## 2020-08-07 LAB
C TRACH DNA SPEC QL NAA+PROBE: NOT DETECTED
N GONORRHOEA DNA SPEC QL NAA+PROBE: NOT DETECTED

## 2020-08-07 PROCEDURE — 76817 TRANSVAGINAL US OBSTETRIC: CPT | Mod: S$GLB,,, | Performed by: OBSTETRICS & GYNECOLOGY

## 2020-08-07 PROCEDURE — 76817 PR US, OB, TRANSVAG APPROACH: ICD-10-PCS | Mod: S$GLB,,, | Performed by: OBSTETRICS & GYNECOLOGY

## 2020-08-12 ENCOUNTER — TELEPHONE (OUTPATIENT)
Dept: OBSTETRICS AND GYNECOLOGY | Facility: CLINIC | Age: 35
End: 2020-08-12

## 2020-08-12 ENCOUNTER — INITIAL PRENATAL (OUTPATIENT)
Dept: OBSTETRICS AND GYNECOLOGY | Facility: CLINIC | Age: 35
End: 2020-08-12
Payer: COMMERCIAL

## 2020-08-12 ENCOUNTER — LAB VISIT (OUTPATIENT)
Dept: LAB | Facility: OTHER | Age: 35
End: 2020-08-12
Attending: OBSTETRICS & GYNECOLOGY
Payer: COMMERCIAL

## 2020-08-12 VITALS
BODY MASS INDEX: 42.46 KG/M2 | DIASTOLIC BLOOD PRESSURE: 85 MMHG | SYSTOLIC BLOOD PRESSURE: 125 MMHG | WEIGHT: 232.13 LBS

## 2020-08-12 DIAGNOSIS — Z3A.01 PREGNANCY WITH 7 COMPLETED WEEKS GESTATION: ICD-10-CM

## 2020-08-12 DIAGNOSIS — Z3A.01 PREGNANCY WITH 7 COMPLETED WEEKS GESTATION: Primary | ICD-10-CM

## 2020-08-12 LAB
ABO + RH BLD: NORMAL
AST SERPL-CCNC: 13 U/L (ref 10–40)
BASOPHILS # BLD AUTO: 0.03 K/UL (ref 0–0.2)
BASOPHILS NFR BLD: 0.4 % (ref 0–1.9)
BLD GP AB SCN CELLS X3 SERPL QL: NORMAL
CREAT SERPL-MCNC: 0.9 MG/DL (ref 0.5–1.4)
DIFFERENTIAL METHOD: ABNORMAL
EOSINOPHIL # BLD AUTO: 0.1 K/UL (ref 0–0.5)
EOSINOPHIL NFR BLD: 0.6 % (ref 0–8)
ERYTHROCYTE [DISTWIDTH] IN BLOOD BY AUTOMATED COUNT: 15.9 % (ref 11.5–14.5)
EST. GFR  (AFRICAN AMERICAN): >60 ML/MIN/1.73 M^2
EST. GFR  (NON AFRICAN AMERICAN): >60 ML/MIN/1.73 M^2
HCT VFR BLD AUTO: 31.8 % (ref 37–48.5)
HGB BLD-MCNC: 10 G/DL (ref 12–16)
IMM GRANULOCYTES # BLD AUTO: 0.03 K/UL (ref 0–0.04)
IMM GRANULOCYTES NFR BLD AUTO: 0.4 % (ref 0–0.5)
LYMPHOCYTES # BLD AUTO: 1.6 K/UL (ref 1–4.8)
LYMPHOCYTES NFR BLD: 19.9 % (ref 18–48)
MCH RBC QN AUTO: 24.4 PG (ref 27–31)
MCHC RBC AUTO-ENTMCNC: 31.4 G/DL (ref 32–36)
MCV RBC AUTO: 78 FL (ref 82–98)
MONOCYTES # BLD AUTO: 0.7 K/UL (ref 0.3–1)
MONOCYTES NFR BLD: 8.6 % (ref 4–15)
NEUTROPHILS # BLD AUTO: 5.6 K/UL (ref 1.8–7.7)
NEUTROPHILS NFR BLD: 70.1 % (ref 38–73)
NRBC BLD-RTO: 0 /100 WBC
PLATELET # BLD AUTO: 273 K/UL (ref 150–350)
PMV BLD AUTO: 10.2 FL (ref 9.2–12.9)
RBC # BLD AUTO: 4.1 M/UL (ref 4–5.4)
WBC # BLD AUTO: 7.95 K/UL (ref 3.9–12.7)

## 2020-08-12 PROCEDURE — 84450 TRANSFERASE (AST) (SGOT): CPT

## 2020-08-12 PROCEDURE — 86762 RUBELLA ANTIBODY: CPT

## 2020-08-12 PROCEDURE — 87086 URINE CULTURE/COLONY COUNT: CPT

## 2020-08-12 PROCEDURE — 36415 COLL VENOUS BLD VENIPUNCTURE: CPT

## 2020-08-12 PROCEDURE — 82565 ASSAY OF CREATININE: CPT

## 2020-08-12 PROCEDURE — 87340 HEPATITIS B SURFACE AG IA: CPT

## 2020-08-12 PROCEDURE — 0500F INITIAL PRENATAL CARE VISIT: CPT | Mod: S$GLB,,, | Performed by: OBSTETRICS & GYNECOLOGY

## 2020-08-12 PROCEDURE — 99999 PR PBB SHADOW E&M-EST. PATIENT-LVL III: ICD-10-PCS | Mod: PBBFAC,,, | Performed by: OBSTETRICS & GYNECOLOGY

## 2020-08-12 PROCEDURE — 86703 HIV-1/HIV-2 1 RESULT ANTBDY: CPT

## 2020-08-12 PROCEDURE — 86592 SYPHILIS TEST NON-TREP QUAL: CPT

## 2020-08-12 PROCEDURE — 86901 BLOOD TYPING SEROLOGIC RH(D): CPT

## 2020-08-12 PROCEDURE — 0500F PR INITIAL PRENATAL CARE VISIT: ICD-10-PCS | Mod: S$GLB,,, | Performed by: OBSTETRICS & GYNECOLOGY

## 2020-08-12 PROCEDURE — 85025 COMPLETE CBC W/AUTO DIFF WBC: CPT

## 2020-08-12 PROCEDURE — 99999 PR PBB SHADOW E&M-EST. PATIENT-LVL III: CPT | Mod: PBBFAC,,, | Performed by: OBSTETRICS & GYNECOLOGY

## 2020-08-12 NOTE — PATIENT INSTRUCTIONS
I suggest you speak with [employer] to get an idea of the out of pocket cost (which can be several thousands of dollars).  Contact [employer] at [phone] option #2.  Speak with the billing department regarding the out of pocket expense to make sure it isn't exorbitant.  In my experience, if they give you a range of costs, it's better to count on the higher amount.  If you're okay with quote received from [employer], you may contact me to enter orders and to set up the blood draw; you can have this test drawn as soon as 11 weeks.    -

## 2020-08-12 NOTE — LETTER
August 12, 2020    Azucena Magana  [address]  [facility] [address]         [facility] [address]  [address] [facility] [address]  [facility] [address]  Phone: [phone]  Fax: [phone] August 12, 2020     Patient: Azucena Magana   YOB: 1985   Date of Visit: 8/12/2020       To Whom It May Concern:    It is my medical opinion that Azucena Magana may return to full duty 8/17/2020 with no restrictions.    If you have any questions or concerns, please don't hesitate to call.    Sincerely,        Joi Najera MD

## 2020-08-12 NOTE — TELEPHONE ENCOUNTER
----- Message from Sudha Senior sent at 8/12/2020 10:18 AM CDT -----  Name of Who is Calling: LEFTY REDMOND [[id number]]    What is the request in detail: Would like to speak with staff in regards to 7/29 appointment and needing information for her job. Please contact to further discuss and advise      Can the clinic reply by [facility]: no    What Number to Call Back if not in [facility]: [phone]

## 2020-08-12 NOTE — PROGRESS NOTES
NO FM YET, NO PV LOSS, SOME RLQ PAIN BUT NO STRONG CRAMPING - POSS ASSOCIATED WITH FIBROID OR CL CYST, REASSURED NOT RED FLAG.  POSS NIPT - DISCUSSED, REF TO DETERMINE PRICING.  NOB LABS AND BASELINE PREE LABS, DISCUSSED ASA PPX AT END OF FIRST TRIMESTER.  ORDER NAIF USG.  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  WANTS BTL AT TIME OF C/S, ADVISED RE NEED FOR CONSENT LATER IN PREGNANCY  F/U VISIT 4 WEEKS

## 2020-08-12 NOTE — TELEPHONE ENCOUNTER
----- Message from Sudha Senior sent at 8/12/2020 10:18 AM CDT -----  Name of Who is Calling: LEFTY REDMOND [[id number]]    What is the request in detail: Would like to speak with staff in regards to 7/29 appointment and needing information for her job. Please contact to further discuss and advise      Can the clinic reply by [facility]: no    What Number to Call Back [facility] not [facility]: [phone]

## 2020-08-13 LAB
BACTERIA UR CULT: NORMAL
BACTERIA UR CULT: NORMAL
HBV SURFACE AG SERPL QL IA: NEGATIVE
HIV 1+2 AB+HIV1 P24 AG SERPL QL IA: NEGATIVE
RPR SER QL: NORMAL
RUBV IGG SER-ACNC: 27 IU/ML
RUBV IGG SER-IMP: REACTIVE

## 2020-08-24 ENCOUNTER — TELEPHONE (OUTPATIENT)
Dept: OBSTETRICS AND GYNECOLOGY | Facility: CLINIC | Age: 35
End: 2020-08-24

## 2020-08-24 RX ORDER — ONDANSETRON 4 MG/1
TABLET, ORALLY DISINTEGRATING ORAL
COMMUNITY
Start: 2020-08-14 | End: 2020-08-24 | Stop reason: SDUPTHER

## 2020-08-24 RX ORDER — ONDANSETRON 4 MG/1
4 TABLET, ORALLY DISINTEGRATING ORAL EVERY 6 HOURS PRN
Qty: 30 TABLET | Refills: 0 | Status: ON HOLD | OUTPATIENT
Start: 2020-08-24 | End: 2021-03-13 | Stop reason: HOSPADM

## 2020-08-24 NOTE — TELEPHONE ENCOUNTER
----- Message from Madhavi Mckeon sent at 8/24/2020  8:51 AM CDT -----  Regarding: Return call    Type:  Patient Returning Call    Who Called: LEFTY REDMOND [[id number]]    Who Left Message for Patient: Liyah     Does the patient know what this is regarding?: Y    Can the clinic reply in [facility]: No    Best Call Back Number: [phone]    Additional Information: She would like to know if the staff is going to call back to r/s

## 2020-09-14 ENCOUNTER — ROUTINE PRENATAL (OUTPATIENT)
Dept: OBSTETRICS AND GYNECOLOGY | Facility: CLINIC | Age: 35
End: 2020-09-14
Payer: COMMERCIAL

## 2020-09-14 VITALS
DIASTOLIC BLOOD PRESSURE: 82 MMHG | BODY MASS INDEX: 43.06 KG/M2 | WEIGHT: 235.44 LBS | SYSTOLIC BLOOD PRESSURE: 122 MMHG

## 2020-09-14 DIAGNOSIS — Z3A.12 PREGNANCY WITH 12 COMPLETED WEEKS GESTATION: Primary | ICD-10-CM

## 2020-09-14 PROCEDURE — 99999 PR PBB SHADOW E&M-EST. PATIENT-LVL II: ICD-10-PCS | Mod: PBBFAC,,, | Performed by: OBSTETRICS & GYNECOLOGY

## 2020-09-14 PROCEDURE — 0502F SUBSEQUENT PRENATAL CARE: CPT | Mod: S$GLB,,, | Performed by: OBSTETRICS & GYNECOLOGY

## 2020-09-14 PROCEDURE — 99999 PR PBB SHADOW E&M-EST. PATIENT-LVL II: CPT | Mod: PBBFAC,,, | Performed by: OBSTETRICS & GYNECOLOGY

## 2020-09-14 PROCEDURE — 0502F PR SUBSEQUENT PRENATAL CARE: ICD-10-PCS | Mod: S$GLB,,, | Performed by: OBSTETRICS & GYNECOLOGY

## 2020-09-14 NOTE — PROGRESS NOTES
NO DEFINITE FM, NO UC AND NO PV LOSS.  NIPT WAS TOO EXPENSIVE, SO WILL REFER NOW FOR NT SCREEN IF CANCELLATION THIS WEEK LEAVES A SPOT OPEN FOR HER.  ADVISED TO STOP HCTZ.  COMFORT MEASURES NAUSEA WITH PNV, CONSTIPATION, ETC.  DISCUSSED ASA IN PREGNANCY.  F/U VISIT 4 WEEKS

## 2020-09-15 ENCOUNTER — PROCEDURE VISIT (OUTPATIENT)
Dept: MATERNAL FETAL MEDICINE | Facility: CLINIC | Age: 35
End: 2020-09-15
Payer: COMMERCIAL

## 2020-09-15 ENCOUNTER — LAB VISIT (OUTPATIENT)
Dept: LAB | Facility: OTHER | Age: 35
End: 2020-09-15
Attending: OBSTETRICS & GYNECOLOGY
Payer: COMMERCIAL

## 2020-09-15 VITALS — WEIGHT: 236.13 LBS | BODY MASS INDEX: 43.19 KG/M2

## 2020-09-15 DIAGNOSIS — O09.521 MULTIGRAVIDA OF ADVANCED MATERNAL AGE IN FIRST TRIMESTER: ICD-10-CM

## 2020-09-15 DIAGNOSIS — Z36.82 ENCOUNTER FOR ANTENATAL SCREENING FOR NUCHAL TRANSLUCENCY: ICD-10-CM

## 2020-09-15 DIAGNOSIS — Z36.89 ENCOUNTER FOR FETAL ANATOMIC SURVEY: Primary | ICD-10-CM

## 2020-09-15 DIAGNOSIS — Z3A.12 PREGNANCY WITH 12 COMPLETED WEEKS GESTATION: ICD-10-CM

## 2020-09-15 PROCEDURE — 76813 OB US NUCHAL MEAS 1 GEST: CPT | Mod: S$GLB,,, | Performed by: OBSTETRICS & GYNECOLOGY

## 2020-09-15 PROCEDURE — 84163 PAPPA SERUM: CPT

## 2020-09-15 PROCEDURE — 36415 COLL VENOUS BLD VENIPUNCTURE: CPT

## 2020-09-15 PROCEDURE — 76813 PR US, OB NUCHAL, TRANSABDOM/TRANSVAG, FIRST GESTATION: ICD-10-PCS | Mod: S$GLB,,, | Performed by: OBSTETRICS & GYNECOLOGY

## 2020-09-18 LAB — INTEGRATED SCN PATIENT-IMP NAR: NORMAL

## 2020-09-19 ENCOUNTER — PATIENT MESSAGE (OUTPATIENT)
Dept: OBSTETRICS AND GYNECOLOGY | Facility: CLINIC | Age: 35
End: 2020-09-19

## 2020-09-28 ENCOUNTER — TELEPHONE (OUTPATIENT)
Dept: OBSTETRICS AND GYNECOLOGY | Facility: CLINIC | Age: 35
End: 2020-09-28

## 2020-09-28 NOTE — TELEPHONE ENCOUNTER
----- Message from Chioma Israel sent at 9/28/2020  8:48 AM CDT -----  Regarding: Call Back  Name of Who is Calling : LEFTY REDMOND [[id number]]    Patient is requesting a call from staff  in regards to her waking up with a headache states she does have bp problems and would like to speak to staff  .....Please contact to further discuss and advise.    Can the clinic reply by [facility] : No     What Number to Call Back :  [phone]

## 2020-09-28 NOTE — TELEPHONE ENCOUNTER
----- Message from Alfredo Wu sent at 9/28/2020 10:22 AM CDT -----  PT IS RETURNING DIANN PHONE CALL [phone]

## 2020-09-28 NOTE — TELEPHONE ENCOUNTER
Pt called in to work and wanted a note for work she had a headache. Pt was advised to take her Blood pressure and call us back with the reading and to help with the headache she can use regular strength tylenol. Pt was informed if she gets no relief after taking the tylenol for her to come into the OB ED if it gets worse. Pt states that she will call back with her reading.

## 2020-10-14 ENCOUNTER — TELEPHONE (OUTPATIENT)
Dept: OBSTETRICS AND GYNECOLOGY | Facility: CLINIC | Age: 35
End: 2020-10-14

## 2020-10-14 ENCOUNTER — LAB VISIT (OUTPATIENT)
Dept: LAB | Facility: OTHER | Age: 35
End: 2020-10-14
Attending: OBSTETRICS & GYNECOLOGY
Payer: COMMERCIAL

## 2020-10-14 ENCOUNTER — ROUTINE PRENATAL (OUTPATIENT)
Dept: OBSTETRICS AND GYNECOLOGY | Facility: CLINIC | Age: 35
End: 2020-10-14
Payer: COMMERCIAL

## 2020-10-14 VITALS
SYSTOLIC BLOOD PRESSURE: 122 MMHG | BODY MASS INDEX: 44.19 KG/M2 | WEIGHT: 241.63 LBS | DIASTOLIC BLOOD PRESSURE: 78 MMHG

## 2020-10-14 DIAGNOSIS — Z3A.16 PREGNANCY WITH 16 COMPLETED WEEKS GESTATION: Primary | ICD-10-CM

## 2020-10-14 DIAGNOSIS — Z3A.16 PREGNANCY WITH 16 COMPLETED WEEKS GESTATION: ICD-10-CM

## 2020-10-14 PROCEDURE — 0502F SUBSEQUENT PRENATAL CARE: CPT | Mod: S$GLB,,, | Performed by: OBSTETRICS & GYNECOLOGY

## 2020-10-14 PROCEDURE — 99999 PR PBB SHADOW E&M-EST. PATIENT-LVL II: ICD-10-PCS | Mod: PBBFAC,,, | Performed by: OBSTETRICS & GYNECOLOGY

## 2020-10-14 PROCEDURE — 36415 COLL VENOUS BLD VENIPUNCTURE: CPT

## 2020-10-14 PROCEDURE — 0502F PR SUBSEQUENT PRENATAL CARE: ICD-10-PCS | Mod: S$GLB,,, | Performed by: OBSTETRICS & GYNECOLOGY

## 2020-10-14 PROCEDURE — 99999 PR PBB SHADOW E&M-EST. PATIENT-LVL II: CPT | Mod: PBBFAC,,, | Performed by: OBSTETRICS & GYNECOLOGY

## 2020-10-14 NOTE — PROGRESS NOTES
NO FM, NO UC OR PV LOSS.  INTEGRATED #2 ORDERED AND REF FOR FLU VACCINE.  COMFORT MEASURES FOR H/A, MILD URI SX.  WARNING SIGNS COVID.  HAS APPT NAIF USG 10/29.  CONNECTED.  VISIT 4 WEEKS

## 2020-10-15 ENCOUNTER — PATIENT MESSAGE (OUTPATIENT)
Dept: ADMINISTRATIVE | Facility: OTHER | Age: 35
End: 2020-10-15

## 2020-10-16 ENCOUNTER — IMMUNIZATION (OUTPATIENT)
Dept: PHARMACY | Facility: CLINIC | Age: 35
End: 2020-10-16
Payer: COMMERCIAL

## 2020-10-16 ENCOUNTER — HOSPITAL ENCOUNTER (EMERGENCY)
Facility: OTHER | Age: 35
Discharge: HOME | End: 2020-10-16
Attending: EMERGENCY MEDICINE
Payer: COMMERCIAL

## 2020-10-16 VITALS
RESPIRATION RATE: 18 BRPM | BODY MASS INDEX: 44.46 KG/M2 | DIASTOLIC BLOOD PRESSURE: 85 MMHG | TEMPERATURE: 98 F | SYSTOLIC BLOOD PRESSURE: 112 MMHG | OXYGEN SATURATION: 100 % | WEIGHT: 241.63 LBS | HEIGHT: 62 IN | HEART RATE: 88 BPM

## 2020-10-16 DIAGNOSIS — R10.9 ABDOMINAL CRAMPING AFFECTING PREGNANCY: Primary | ICD-10-CM

## 2020-10-16 DIAGNOSIS — O26.899 ABDOMINAL CRAMPING AFFECTING PREGNANCY: Primary | ICD-10-CM

## 2020-10-16 LAB
ALBUMIN SERPL BCP-MCNC: 3 G/DL (ref 3.5–5.2)
ALP SERPL-CCNC: 84 U/L (ref 55–135)
ALT SERPL W/O P-5'-P-CCNC: 14 U/L (ref 10–44)
ANION GAP SERPL CALC-SCNC: 8 MMOL/L (ref 8–16)
AST SERPL-CCNC: 16 U/L (ref 10–40)
B-HCG UR QL: POSITIVE
BACTERIA #/AREA URNS HPF: ABNORMAL /HPF
BASOPHILS # BLD AUTO: 0.02 K/UL (ref 0–0.2)
BASOPHILS NFR BLD: 0.2 % (ref 0–1.9)
BILIRUB SERPL-MCNC: 0.2 MG/DL (ref 0.1–1)
BILIRUB UR QL STRIP: NEGATIVE
BUN SERPL-MCNC: 8 MG/DL (ref 6–20)
CALCIUM SERPL-MCNC: 9.2 MG/DL (ref 8.7–10.5)
CHLORIDE SERPL-SCNC: 106 MMOL/L (ref 95–110)
CLARITY UR: CLEAR
CO2 SERPL-SCNC: 23 MMOL/L (ref 23–29)
COLOR UR: YELLOW
CREAT SERPL-MCNC: 0.8 MG/DL (ref 0.5–1.4)
CTP QC/QA: YES
DIFFERENTIAL METHOD: ABNORMAL
EOSINOPHIL # BLD AUTO: 0.1 K/UL (ref 0–0.5)
EOSINOPHIL NFR BLD: 0.8 % (ref 0–8)
ERYTHROCYTE [DISTWIDTH] IN BLOOD BY AUTOMATED COUNT: 16.3 % (ref 11.5–14.5)
EST. GFR  (AFRICAN AMERICAN): >60 ML/MIN/1.73 M^2
EST. GFR  (NON AFRICAN AMERICAN): >60 ML/MIN/1.73 M^2
GLUCOSE SERPL-MCNC: 86 MG/DL (ref 70–110)
GLUCOSE UR QL STRIP: NEGATIVE
HCT VFR BLD AUTO: 31.1 % (ref 37–48.5)
HGB BLD-MCNC: 10.1 G/DL (ref 12–16)
HGB UR QL STRIP: NEGATIVE
IMM GRANULOCYTES # BLD AUTO: 0.05 K/UL (ref 0–0.04)
IMM GRANULOCYTES NFR BLD AUTO: 0.5 % (ref 0–0.5)
KETONES UR QL STRIP: NEGATIVE
LEUKOCYTE ESTERASE UR QL STRIP: ABNORMAL
LYMPHOCYTES # BLD AUTO: 1.5 K/UL (ref 1–4.8)
LYMPHOCYTES NFR BLD: 14.6 % (ref 18–48)
MCH RBC QN AUTO: 25.5 PG (ref 27–31)
MCHC RBC AUTO-ENTMCNC: 32.5 G/DL (ref 32–36)
MCV RBC AUTO: 79 FL (ref 82–98)
MICROSCOPIC COMMENT: ABNORMAL
MONOCYTES # BLD AUTO: 0.8 K/UL (ref 0.3–1)
MONOCYTES NFR BLD: 8.4 % (ref 4–15)
NEUTROPHILS # BLD AUTO: 7.5 K/UL (ref 1.8–7.7)
NEUTROPHILS NFR BLD: 75.5 % (ref 38–73)
NITRITE UR QL STRIP: NEGATIVE
NRBC BLD-RTO: 0 /100 WBC
PH UR STRIP: 6 [PH] (ref 5–8)
PLATELET # BLD AUTO: 295 K/UL (ref 150–350)
PMV BLD AUTO: 10.4 FL (ref 9.2–12.9)
POTASSIUM SERPL-SCNC: 4 MMOL/L (ref 3.5–5.1)
PROT SERPL-MCNC: 7.8 G/DL (ref 6–8.4)
PROT UR QL STRIP: NEGATIVE
RBC # BLD AUTO: 3.96 M/UL (ref 4–5.4)
RBC #/AREA URNS HPF: 0 /HPF (ref 0–4)
SODIUM SERPL-SCNC: 137 MMOL/L (ref 136–145)
SP GR UR STRIP: 1.01 (ref 1–1.03)
SQUAMOUS #/AREA URNS HPF: 8 /HPF
URN SPEC COLLECT METH UR: ABNORMAL
UROBILINOGEN UR STRIP-ACNC: NEGATIVE EU/DL
WBC # BLD AUTO: 9.92 K/UL (ref 3.9–12.7)
WBC #/AREA URNS HPF: 6 /HPF (ref 0–5)

## 2020-10-16 PROCEDURE — 85025 COMPLETE CBC W/AUTO DIFF WBC: CPT

## 2020-10-16 PROCEDURE — 81025 URINE PREGNANCY TEST: CPT | Performed by: EMERGENCY MEDICINE

## 2020-10-16 PROCEDURE — 99284 EMERGENCY DEPT VISIT MOD MDM: CPT | Mod: 25

## 2020-10-16 PROCEDURE — 87086 URINE CULTURE/COLONY COUNT: CPT

## 2020-10-16 PROCEDURE — 96360 HYDRATION IV INFUSION INIT: CPT

## 2020-10-16 PROCEDURE — 25000003 PHARM REV CODE 250: Performed by: PHYSICIAN ASSISTANT

## 2020-10-16 PROCEDURE — 80053 COMPREHEN METABOLIC PANEL: CPT

## 2020-10-16 PROCEDURE — 96361 HYDRATE IV INFUSION ADD-ON: CPT

## 2020-10-16 PROCEDURE — 81000 URINALYSIS NONAUTO W/SCOPE: CPT

## 2020-10-16 RX ORDER — ASPIRIN 81 MG/1
81 TABLET ORAL DAILY
Status: ON HOLD | COMMUNITY
End: 2021-03-13 | Stop reason: HOSPADM

## 2020-10-16 RX ORDER — ACETAMINOPHEN 500 MG
1000 TABLET ORAL
Status: COMPLETED | OUTPATIENT
Start: 2020-10-16 | End: 2020-10-16

## 2020-10-16 RX ADMIN — ACETAMINOPHEN 1000 MG: 500 TABLET, FILM COATED ORAL at 12:10

## 2020-10-16 RX ADMIN — SODIUM CHLORIDE 1000 ML: 0.9 INJECTION, SOLUTION INTRAVENOUS at 11:10

## 2020-10-16 NOTE — ED PROVIDER NOTES
Encounter Date: 10/16/2020       History     Chief Complaint   Patient presents with    Abdominal Cramping     Reports 17 weeks pregnant, complains of intermittent abdominal cramping x 3 days, denies vaginal bleeding, denies urinary complaints     35 y.o. female with PMH of HTN, migraines, abnormal pap smear and obesity presents to ED for evaluation of lower abdominal cramping in pregnancy. She is 17 weeks gestation and followed by Dr. Najera. She reports intermittent lower abdominal cramping for the past several days. She discussed with OB at appointment this week and informed likely ligamental pain. She denies any additional complaints including worsening pain, vaginal bleeding, vaginal discharge, dysuria or hematuria. She has not tried any medications for the symptoms. She denies any previous complications with pregnancy    The history is provided by the patient.     Review of patient's allergies indicates:  No Known Allergies  Past Medical History:   Diagnosis Date    Abnormal Pap smear of cervix     Hypertension     Migraine headache     Obesity      Past Surgical History:   Procedure Laterality Date    CA TREAT ECTOPIC PREG,NON REMVAL Right 7/12/15    Right salpingectomy and partial cornual resection     Family History   Problem Relation Age of Onset    Ovarian cancer Maternal Grandmother     Colon cancer Neg Hx     Breast cancer Neg Hx      Social History     Tobacco Use    Smoking status: Never Smoker    Smokeless tobacco: Never Used   Substance Use Topics    Alcohol use: No    Drug use: No     Review of Systems   Constitutional: Negative for activity change, chills and fever.   HENT: Negative for sore throat.    Respiratory: Negative for shortness of breath.    Cardiovascular: Negative for chest pain.   Gastrointestinal: Positive for abdominal pain (lower abdominal cramping). Negative for nausea and vomiting.   Genitourinary: Negative for dysuria, vaginal bleeding and vaginal discharge.    Musculoskeletal: Negative for arthralgias and back pain.   Skin: Negative for rash.   Neurological: Negative for weakness.   Hematological: Does not bruise/bleed easily.       Physical Exam     Initial Vitals [10/16/20 1057]   BP Pulse Resp Temp SpO2   118/77 106 18 98.2 °F (36.8 °C) 100 %      MAP       --         Physical Exam    Nursing note and vitals reviewed.  Constitutional: Vital signs are normal. She appears well-developed and well-nourished. She is cooperative.  Non-toxic appearance. She does not appear ill. No distress.   HENT:   Head: Normocephalic and atraumatic.   Eyes: Conjunctivae and lids are normal.   Neck: Neck supple. No neck rigidity.   Cardiovascular: Normal rate and regular rhythm.   Pulmonary/Chest: Breath sounds normal. No respiratory distress. She has no wheezes. She has no rhonchi.   Abdominal: Soft. Normal appearance and bowel sounds are normal. There is no abdominal tenderness. There is no rigidity and no guarding.   Gravid abdomen; no TTP   Musculoskeletal: Normal range of motion.   Neurological: She is alert and oriented to person, place, and time. GCS eye subscore is 4. GCS verbal subscore is 5. GCS motor subscore is 6.   Skin: Skin is warm, dry and intact. No rash noted.   Psychiatric: She has a normal mood and affect. Her speech is normal and behavior is normal. Thought content normal.         ED Course   Procedures  Labs Reviewed   URINALYSIS, REFLEX TO URINE CULTURE - Abnormal; Notable for the following components:       Result Value    Leukocytes, UA 1+ (*)     All other components within normal limits    Narrative:     Specimen Source->Urine   CBC W/ AUTO DIFFERENTIAL - Abnormal; Notable for the following components:    RBC 3.96 (*)     Hemoglobin 10.1 (*)     Hematocrit 31.1 (*)     Mean Corpuscular Volume 79 (*)     Mean Corpuscular Hemoglobin 25.5 (*)     RDW 16.3 (*)     Immature Grans (Abs) 0.05 (*)     Gran% 75.5 (*)     Lymph% 14.6 (*)     All other components within  normal limits   COMPREHENSIVE METABOLIC PANEL - Abnormal; Notable for the following components:    Albumin 3.0 (*)     All other components within normal limits   URINALYSIS MICROSCOPIC - Abnormal; Notable for the following components:    WBC, UA 6 (*)     All other components within normal limits    Narrative:     Specimen Source->Urine   POCT URINE PREGNANCY - Abnormal; Notable for the following components:    POC Preg Test, Ur Positive (*)     All other components within normal limits   CULTURE, URINE          Imaging Results    None          Medical Decision Making:   Initial Assessment:   Well appearing female presents for lower abdominal cramping. Patent appears in no distress. Abdomen is gravid with no TTP; no CVA tenderness.   Differential Diagnosis:   Differential diagnosis  Ligamental pain, threatened AB, inevitable AB, incomplete Ab, missed AB, uterine rupture, ectopic pregnancy, placental abruption, placenta previa), UTI, dehydration    Clinical Tests:   Lab Tests: Ordered and Reviewed  ED Management:  Urgent evaluation of abdominal cramping in patient with gestation of 17 weeks. Previously confirmed IUP with no reported bleeding or discharge. labs reveal stable anemia. No electrolyte abnormality. FHR is WNL's. UA reveals 1 leukocyte and some bacteria however poor specimen. Discussed with OB on call with recommendations to send urine culture and no further emergent recommendations and that patient should follow with OB. Patient reported some improvement with IV fluids and tylenol. She was encouraged t increase hydration and use tylenol as needed. Discussed no apparent emergent etiology at this time. Strict instructions to follow up with primary care physician or reference provided for further assessment and evaluation. Given instructions to return for any acute symptoms and verbalized understanding of this medical plan.                               Clinical Impression:     ICD-10-CM ICD-9-CM   1.  Abdominal cramping affecting pregnancy  O26.899 646.80    R10.9 789.00                          ED Disposition Condition    Discharge Stable        ED Prescriptions     None        Follow-up Information     Follow up With Specialties Details Why Contact Info    Joi Najera MD Obstetrics, Obstetrics and Gynecology Schedule an appointment as soon as possible for a visit   [address] [facility] [address]  [facility] [address]  [phone]      [facility] Emergency Medicine  If symptoms worsen [address] [facility] [address]  [phone]                                       PIO Garcia  10/17/20 134

## 2020-10-16 NOTE — FIRST PROVIDER EVALUATION
Emergency Department TeleTriage Encounter Note      CHIEF COMPLAINT    Chief Complaint   Patient presents with    Abdominal Cramping     Reports 17 weeks pregnant, complains of intermittent abdominal cramping x 3 days, denies vaginal bleeding, denies urinary complaints       VITAL SIGNS   Initial Vitals [10/16/20 1057]   BP Pulse Resp Temp SpO2   118/77 106 18 98.2 °F (36.8 °C) 100 %      MAP       --            ALLERGIES    Review of patient's allergies indicates:  No Known Allergies    PROVIDER TRIAGE NOTE  Pt is a 35 yr old female A1 who is approximately 17 weeks pregnant who presents to the ED with pelvic cramping.  No urinary symptoms.  No vaginal bleeding.  Confirmed IUP.  Will start with urine and hearttones.       ORDERS  Labs Reviewed   URINALYSIS, REFLEX TO URINE CULTURE   POCT URINE PREGNANCY       ED Orders (720h ago, onward)    Start Ordered     Status Ordering Provider    10/16/20 1106 10/16/20 1105  Assess fetal heart tones  Until discontinued      Ordered MERRY TAI    10/16/20 1101 10/16/20 1101  Urinalysis, Reflex to Urine Culture Urine, Clean Catch  STAT      Ordered BOSSMAN FOX    10/16/20 1101 10/16/20 1101  POCT urine pregnancy  Once      Ordered BOSSMAN FOX            Virtual Visit Note: The provider triage portion of this emergency department evaluation and documentation was performed via [employer], a HIPAA-compliant telemedicine application, in concert with a tele-presenter in the room. A face to face patient evaluation with one of my colleagues will occur once the patient is placed in an emergency department room.      DISCLAIMER: This note was prepared with [employer] voice recognition transcription software. Garbled syntax, mangled pronouns, and other bizarre constructions may be attributed to that software system.

## 2020-10-16 NOTE — Clinical Note
"Azucena "Cindy Magana was seen and treated in our emergency department on 10/16/2020.  She may return to work on 10/17/2020.       If you have any questions or concerns, please don't hesitate to call.      PIO Garcia"

## 2020-10-16 NOTE — ED TRIAGE NOTES
Pt presents to the ED w/ c/o intermittent abdominal cramping x 3 days.  Denies n, v, d, vaginal bleeding, urinary symptoms.  Currently 17 weeks pregnant.  G 4 P2

## 2020-10-18 LAB — BACTERIA UR CULT: NORMAL

## 2020-10-19 ENCOUNTER — PATIENT MESSAGE (OUTPATIENT)
Dept: OBSTETRICS AND GYNECOLOGY | Facility: CLINIC | Age: 35
End: 2020-10-19

## 2020-10-19 LAB
# FETUSES US: NORMAL
AFP MOM SERPL: 1.03
AFP SERPL-MCNC: 31.5 NG/ML
AGE AT DELIVERY: 36
B-HCG MOM SERPL: 0.97
B-HCG SERPL-ACNC: 25.7 IU/ML
COLLECT DATE BLD: NORMAL
COLLECT DATE: NORMAL
FET TS 21 RISK FROM MAT AGE: NORMAL
GA (DAYS): 5 D
GA (WEEKS): 12 WK
GA METHOD: NORMAL
GEST. AGE (DAYS) 2ND SAMPLE (SI2): 6
GEST. AGE (WKS) 2ND SAMPLE (SI2): 16
IDDM PATIENT QL: NORMAL
INHIBIN A MOM SERPL: 0.65
INHIBIN A SERPL-MCNC: 73.7 PG/ML
INTEGRATED SCN PATIENT-IMP NAR: NORMAL
INTEGRATED SCN PATIENT-IMP: NEGATIVE
PAPP-A MOM SERPL: 0.94
PAPP-A SERPL-MCNC: 794.7 NG/ML
SMOKING STATUS FTND: NO
TS 18 RISK FETUS: NORMAL
TS 21 RISK FETUS: NORMAL
U ESTRIOL MOM SERPL: 1.36
U ESTRIOL SERPL-MCNC: 1.4 NG/ML

## 2020-10-20 ENCOUNTER — TELEPHONE (OUTPATIENT)
Dept: OBSTETRICS AND GYNECOLOGY | Facility: CLINIC | Age: 35
End: 2020-10-20

## 2020-10-20 NOTE — TELEPHONE ENCOUNTER
----- Message from Fabiola Morales sent at 10/19/2020  8:25 AM CDT -----  Who Called: LEFTY REDMOND     Symptoms (please be specific): Lower abdominal pain, pt is 17 weeks pregnant.     How long has patient had these symptoms:  Last Wednesday    Pharmacy name and phone #:   [facility] #[id number] - [place] [id number] AT [facility] [address]    Best Call Back Number: [phone]

## 2020-10-20 NOTE — TELEPHONE ENCOUNTER
Called patient back, patient is not in any pain today, all questions and concerns were addressed.

## 2020-10-29 ENCOUNTER — PROCEDURE VISIT (OUTPATIENT)
Dept: MATERNAL FETAL MEDICINE | Facility: CLINIC | Age: 35
End: 2020-10-29
Payer: COMMERCIAL

## 2020-10-29 DIAGNOSIS — O09.522 MULTIGRAVIDA OF ADVANCED MATERNAL AGE IN SECOND TRIMESTER: ICD-10-CM

## 2020-10-29 DIAGNOSIS — Z3A.01 PREGNANCY WITH 7 COMPLETED WEEKS GESTATION: ICD-10-CM

## 2020-10-29 DIAGNOSIS — Z36.89 ENCOUNTER FOR FETAL ANATOMIC SURVEY: ICD-10-CM

## 2020-10-29 PROCEDURE — 76811 OB US DETAILED SNGL FETUS: CPT | Mod: S$GLB,,, | Performed by: OBSTETRICS & GYNECOLOGY

## 2020-10-29 PROCEDURE — 76811 PR US, OB FETAL EVAL & EXAM, TRANSABDOM,FIRST GESTATION: ICD-10-PCS | Mod: S$GLB,,, | Performed by: OBSTETRICS & GYNECOLOGY

## 2020-11-12 ENCOUNTER — PATIENT MESSAGE (OUTPATIENT)
Dept: OBSTETRICS AND GYNECOLOGY | Facility: CLINIC | Age: 35
End: 2020-11-12

## 2020-11-18 ENCOUNTER — ROUTINE PRENATAL (OUTPATIENT)
Dept: OBSTETRICS AND GYNECOLOGY | Facility: CLINIC | Age: 35
End: 2020-11-18
Payer: COMMERCIAL

## 2020-11-18 VITALS
BODY MASS INDEX: 45.97 KG/M2 | WEIGHT: 251.31 LBS | SYSTOLIC BLOOD PRESSURE: 135 MMHG | DIASTOLIC BLOOD PRESSURE: 81 MMHG

## 2020-11-18 DIAGNOSIS — Z3A.22 PREGNANCY WITH 22 COMPLETED WEEKS GESTATION: Primary | ICD-10-CM

## 2020-11-18 PROCEDURE — 0502F SUBSEQUENT PRENATAL CARE: CPT | Mod: S$GLB,,, | Performed by: OBSTETRICS & GYNECOLOGY

## 2020-11-18 PROCEDURE — 99999 PR PBB SHADOW E&M-EST. PATIENT-LVL II: CPT | Mod: PBBFAC,,, | Performed by: OBSTETRICS & GYNECOLOGY

## 2020-11-18 PROCEDURE — 99999 PR PBB SHADOW E&M-EST. PATIENT-LVL II: ICD-10-PCS | Mod: PBBFAC,,, | Performed by: OBSTETRICS & GYNECOLOGY

## 2020-11-18 PROCEDURE — 0502F PR SUBSEQUENT PRENATAL CARE: ICD-10-PCS | Mod: S$GLB,,, | Performed by: OBSTETRICS & GYNECOLOGY

## 2020-11-18 NOTE — PROGRESS NOTES
GOOD FM, NO UC AND NO PV LOSS.  HAS APPT 12/2 F/U ANATOMY.  REVIEWED WARNING SIGNS PREE.  REVEAL THIS WEEKEND, F/U 4 WEEKS

## 2020-12-02 ENCOUNTER — PROCEDURE VISIT (OUTPATIENT)
Dept: MATERNAL FETAL MEDICINE | Facility: CLINIC | Age: 35
End: 2020-12-02
Payer: COMMERCIAL

## 2020-12-02 DIAGNOSIS — O16.2 HYPERTENSION AFFECTING PREGNANCY IN SECOND TRIMESTER: ICD-10-CM

## 2020-12-02 DIAGNOSIS — O99.212 OBESITY AFFECTING PREGNANCY IN SECOND TRIMESTER: ICD-10-CM

## 2020-12-02 DIAGNOSIS — Z36.4 ANTENATAL SCREENING FOR FETAL GROWTH RETARDATION USING ULTRASONICS: ICD-10-CM

## 2020-12-02 DIAGNOSIS — O09.522 MULTIGRAVIDA OF ADVANCED MATERNAL AGE IN SECOND TRIMESTER: ICD-10-CM

## 2020-12-02 PROCEDURE — 76816 OB US FOLLOW-UP PER FETUS: CPT | Mod: S$GLB,,, | Performed by: OBSTETRICS & GYNECOLOGY

## 2020-12-02 PROCEDURE — 76816 PR  US,PREGNANT UTERUS,F/U,TRANSABD APP: ICD-10-PCS | Mod: S$GLB,,, | Performed by: OBSTETRICS & GYNECOLOGY

## 2020-12-23 ENCOUNTER — ROUTINE PRENATAL (OUTPATIENT)
Dept: OBSTETRICS AND GYNECOLOGY | Facility: CLINIC | Age: 35
End: 2020-12-23
Payer: COMMERCIAL

## 2020-12-23 VITALS — BODY MASS INDEX: 46.45 KG/M2 | DIASTOLIC BLOOD PRESSURE: 78 MMHG | SYSTOLIC BLOOD PRESSURE: 118 MMHG | WEIGHT: 254 LBS

## 2020-12-23 DIAGNOSIS — Z3A.26 PREGNANCY WITH 26 COMPLETED WEEKS GESTATION: Primary | ICD-10-CM

## 2020-12-23 PROCEDURE — 0502F SUBSEQUENT PRENATAL CARE: CPT | Mod: S$GLB,,, | Performed by: OBSTETRICS & GYNECOLOGY

## 2020-12-23 PROCEDURE — 0502F PR SUBSEQUENT PRENATAL CARE: ICD-10-PCS | Mod: S$GLB,,, | Performed by: OBSTETRICS & GYNECOLOGY

## 2020-12-23 PROCEDURE — 99999 PR PBB SHADOW E&M-EST. PATIENT-LVL II: CPT | Mod: PBBFAC,,, | Performed by: OBSTETRICS & GYNECOLOGY

## 2020-12-23 PROCEDURE — 99999 PR PBB SHADOW E&M-EST. PATIENT-LVL II: ICD-10-PCS | Mod: PBBFAC,,, | Performed by: OBSTETRICS & GYNECOLOGY

## 2020-12-23 NOTE — PROGRESS NOTES
GOOD FM, NO UC AND NO PV LOSS.  HAS APPT F/U USG 1/7 AND ORDERS GCT AND TDAP.  DISCUSSED PROB DELIV 36-37 WEEKS DUE TO PRIOR CORNUAL RESECTION.  VISIT 2 WEEKS

## 2020-12-30 ENCOUNTER — PATIENT MESSAGE (OUTPATIENT)
Dept: OBSTETRICS AND GYNECOLOGY | Facility: CLINIC | Age: 35
End: 2020-12-30

## 2020-12-30 ENCOUNTER — TELEPHONE (OUTPATIENT)
Dept: OBSTETRICS AND GYNECOLOGY | Facility: CLINIC | Age: 35
End: 2020-12-30

## 2020-12-30 ENCOUNTER — ROUTINE PRENATAL (OUTPATIENT)
Dept: OBSTETRICS AND GYNECOLOGY | Facility: CLINIC | Age: 35
End: 2020-12-30
Payer: COMMERCIAL

## 2020-12-30 VITALS
WEIGHT: 252.44 LBS | DIASTOLIC BLOOD PRESSURE: 72 MMHG | BODY MASS INDEX: 46.17 KG/M2 | SYSTOLIC BLOOD PRESSURE: 132 MMHG

## 2020-12-30 DIAGNOSIS — Z98.891 HISTORY OF CESAREAN DELIVERY: ICD-10-CM

## 2020-12-30 DIAGNOSIS — Z3A.27 27 WEEKS GESTATION OF PREGNANCY: Primary | ICD-10-CM

## 2020-12-30 DIAGNOSIS — Z87.59 HISTORY OF PRE-ECLAMPSIA: ICD-10-CM

## 2020-12-30 PROCEDURE — 0502F SUBSEQUENT PRENATAL CARE: CPT | Mod: S$GLB,,, | Performed by: STUDENT IN AN ORGANIZED HEALTH CARE EDUCATION/TRAINING PROGRAM

## 2020-12-30 PROCEDURE — 0502F PR SUBSEQUENT PRENATAL CARE: ICD-10-PCS | Mod: S$GLB,,, | Performed by: STUDENT IN AN ORGANIZED HEALTH CARE EDUCATION/TRAINING PROGRAM

## 2020-12-30 PROCEDURE — 99999 PR PBB SHADOW E&M-EST. PATIENT-LVL II: ICD-10-PCS | Mod: PBBFAC,,, | Performed by: STUDENT IN AN ORGANIZED HEALTH CARE EDUCATION/TRAINING PROGRAM

## 2020-12-30 PROCEDURE — 99999 PR PBB SHADOW E&M-EST. PATIENT-LVL II: CPT | Mod: PBBFAC,,, | Performed by: STUDENT IN AN ORGANIZED HEALTH CARE EDUCATION/TRAINING PROGRAM

## 2020-12-30 NOTE — PROGRESS NOTES
Doing well. NO bleeding, cramping/ctx, or LOF. +FM. Notes lighter movement than last pregnancy but still present.   Patient denies HA/vision changes/CP/SOB. Denies lightheadedness/dizziness. Denies RUQ or epigastric pain.      /72   Wt 114.5 kg (252 lb 6.8 oz)   LMP 2020 (Exact Date)   BMI 46.17 kg/m²     35 y.o., at 27w6d by Estimated Date of Delivery: 3/25/21  Patient Active Problem List   Diagnosis    Ectopic pregnancy    Prenatal care, subsequent pregnancy    Hypertension complicating pregnancy    Obesity complicating pregnancy    Short interval between pregnancies affecting pregnancy, antepartum    Morbid obesity     OB History    Para Term  AB Living   4 2 1 1 1 2   SAB TAB Ectopic Multiple Live Births   0 0 1 0 2      # Outcome Date GA Lbr Michael/2nd Weight Sex Delivery Anes PTL Lv   4 Current            3  16 36w1d  1.655 kg (3 lb 10.4 oz) F CS-LTranv Spinal N ZAY   2 Ectopic 2015              Complications: S/P laparoscopic procedure   1 Term 06 40w0d  3.204 kg (7 lb 1 oz) F Vag-Spont EPI  ZAY       Dating reviewed    Allergies and problem list reviewed and updated    Medical and surgical history reviewed    Prenatal labs reviewed and updated    Physical Exam:  ABD: soft, gravid, nontender, measures appropriately  Ext: BLE symmetric, no edema bilaterally    Assessment/Plan:  at 27w6d w/ h/o CS and ectopic with cornual resection, h/o preE  - BP normal, asymptomatic  - has OB Glucose, CBC, Tdap scheduled next week  - f/u MFM ultrasound next week    All questions answered. Routine  labor and preE precautions given.  Plans to have next visit with Dr. Najera on [facility].     Love Cedillo MD  Obstetrics & Gynecology   [facility]

## 2020-12-30 NOTE — TELEPHONE ENCOUNTER
S/w pt states she is experiencing a headache with no relief from taking Tylenol, pt scheduled for appt with Janett Arambula for today

## 2021-01-05 ENCOUNTER — HOSPITAL ENCOUNTER (EMERGENCY)
Facility: OTHER | Age: 36
Discharge: HOME | End: 2021-01-06
Attending: OBSTETRICS & GYNECOLOGY
Payer: COMMERCIAL

## 2021-01-05 DIAGNOSIS — N93.9 VAGINAL BLEEDING: Primary | ICD-10-CM

## 2021-01-05 PROCEDURE — 59025 FETAL NON-STRESS TEST: CPT | Mod: 26,,, | Performed by: OBSTETRICS & GYNECOLOGY

## 2021-01-05 PROCEDURE — 99284 EMERGENCY DEPT VISIT MOD MDM: CPT | Mod: 25

## 2021-01-05 PROCEDURE — 99283 EMERGENCY DEPT VISIT LOW MDM: CPT | Mod: 25,,, | Performed by: OBSTETRICS & GYNECOLOGY

## 2021-01-05 PROCEDURE — 59025 FETAL NON-STRESS TEST: CPT

## 2021-01-05 PROCEDURE — 99283 PR EMERGENCY DEPT VISIT,LEVEL III: ICD-10-PCS | Mod: 25,,, | Performed by: OBSTETRICS & GYNECOLOGY

## 2021-01-05 PROCEDURE — 59025 PR FETAL 2N-STRESS TEST: ICD-10-PCS | Mod: 26,,, | Performed by: OBSTETRICS & GYNECOLOGY

## 2021-01-06 VITALS — SYSTOLIC BLOOD PRESSURE: 123 MMHG | HEART RATE: 104 BPM | DIASTOLIC BLOOD PRESSURE: 72 MMHG | OXYGEN SATURATION: 100 %

## 2021-01-06 PROCEDURE — 59025 FETAL NON-STRESS TEST: CPT

## 2021-01-07 ENCOUNTER — PATIENT MESSAGE (OUTPATIENT)
Dept: OBSTETRICS AND GYNECOLOGY | Facility: CLINIC | Age: 36
End: 2021-01-07

## 2021-01-07 ENCOUNTER — CLINICAL SUPPORT (OUTPATIENT)
Dept: OBSTETRICS AND GYNECOLOGY | Facility: CLINIC | Age: 36
End: 2021-01-07
Payer: COMMERCIAL

## 2021-01-07 ENCOUNTER — PROCEDURE VISIT (OUTPATIENT)
Dept: MATERNAL FETAL MEDICINE | Facility: CLINIC | Age: 36
End: 2021-01-07
Payer: COMMERCIAL

## 2021-01-07 ENCOUNTER — LAB VISIT (OUTPATIENT)
Dept: LAB | Facility: OTHER | Age: 36
End: 2021-01-07
Attending: OBSTETRICS & GYNECOLOGY
Payer: COMMERCIAL

## 2021-01-07 DIAGNOSIS — Z36.89 ENCOUNTER FOR ULTRASOUND TO ASSESS FETAL GROWTH: Primary | ICD-10-CM

## 2021-01-07 DIAGNOSIS — Z3A.26 PREGNANCY WITH 26 COMPLETED WEEKS GESTATION: ICD-10-CM

## 2021-01-07 DIAGNOSIS — Z3A.27 27 WEEKS GESTATION OF PREGNANCY: Primary | ICD-10-CM

## 2021-01-07 LAB
BASOPHILS # BLD AUTO: 0.02 K/UL (ref 0–0.2)
BASOPHILS NFR BLD: 0.2 % (ref 0–1.9)
DIFFERENTIAL METHOD: ABNORMAL
EOSINOPHIL # BLD AUTO: 0.1 K/UL (ref 0–0.5)
EOSINOPHIL NFR BLD: 0.5 % (ref 0–8)
ERYTHROCYTE [DISTWIDTH] IN BLOOD BY AUTOMATED COUNT: 14.4 % (ref 11.5–14.5)
GLUCOSE SERPL-MCNC: 132 MG/DL (ref 70–140)
HCT VFR BLD AUTO: 28.3 % (ref 37–48.5)
HGB BLD-MCNC: 8.7 G/DL (ref 12–16)
IMM GRANULOCYTES # BLD AUTO: 0.13 K/UL (ref 0–0.04)
IMM GRANULOCYTES NFR BLD AUTO: 1.3 % (ref 0–0.5)
LYMPHOCYTES # BLD AUTO: 1.6 K/UL (ref 1–4.8)
LYMPHOCYTES NFR BLD: 15.9 % (ref 18–48)
MCH RBC QN AUTO: 23.6 PG (ref 27–31)
MCHC RBC AUTO-ENTMCNC: 30.7 G/DL (ref 32–36)
MCV RBC AUTO: 77 FL (ref 82–98)
MONOCYTES # BLD AUTO: 0.6 K/UL (ref 0.3–1)
MONOCYTES NFR BLD: 5.8 % (ref 4–15)
NEUTROPHILS # BLD AUTO: 7.5 K/UL (ref 1.8–7.7)
NEUTROPHILS NFR BLD: 76.3 % (ref 38–73)
NRBC BLD-RTO: 0 /100 WBC
PLATELET # BLD AUTO: 297 K/UL (ref 150–350)
PMV BLD AUTO: 10.5 FL (ref 9.2–12.9)
RBC # BLD AUTO: 3.69 M/UL (ref 4–5.4)
WBC # BLD AUTO: 9.79 K/UL (ref 3.9–12.7)

## 2021-01-07 PROCEDURE — 76816 PR  US,PREGNANT UTERUS,F/U,TRANSABD APP: ICD-10-PCS | Mod: 26,S$GLB,, | Performed by: OBSTETRICS & GYNECOLOGY

## 2021-01-07 PROCEDURE — 90471 IMMUNIZATION ADMIN: CPT | Mod: S$GLB,,, | Performed by: OBSTETRICS & GYNECOLOGY

## 2021-01-07 PROCEDURE — 82950 GLUCOSE TEST: CPT

## 2021-01-07 PROCEDURE — 90715 TDAP VACCINE 7 YRS/> IM: CPT | Mod: S$GLB,,, | Performed by: OBSTETRICS & GYNECOLOGY

## 2021-01-07 PROCEDURE — 36415 COLL VENOUS BLD VENIPUNCTURE: CPT

## 2021-01-07 PROCEDURE — 99999 PR PBB SHADOW E&M-EST. PATIENT-LVL I: ICD-10-PCS | Mod: PBBFAC,,,

## 2021-01-07 PROCEDURE — 99999 PR PBB SHADOW E&M-EST. PATIENT-LVL I: CPT | Mod: PBBFAC,,,

## 2021-01-07 PROCEDURE — 76816 OB US FOLLOW-UP PER FETUS: CPT | Mod: 26,S$GLB,, | Performed by: OBSTETRICS & GYNECOLOGY

## 2021-01-07 PROCEDURE — 90715 TDAP VACCINE GREATER THAN OR EQUAL TO 7YO IM: ICD-10-PCS | Mod: S$GLB,,, | Performed by: OBSTETRICS & GYNECOLOGY

## 2021-01-07 PROCEDURE — 85025 COMPLETE CBC W/AUTO DIFF WBC: CPT

## 2021-01-07 PROCEDURE — 90471 TDAP VACCINE GREATER THAN OR EQUAL TO 7YO IM: ICD-10-PCS | Mod: S$GLB,,, | Performed by: OBSTETRICS & GYNECOLOGY

## 2021-01-08 ENCOUNTER — PATIENT MESSAGE (OUTPATIENT)
Dept: OBSTETRICS AND GYNECOLOGY | Facility: CLINIC | Age: 36
End: 2021-01-08

## 2021-01-13 ENCOUNTER — ROUTINE PRENATAL (OUTPATIENT)
Dept: OBSTETRICS AND GYNECOLOGY | Facility: CLINIC | Age: 36
End: 2021-01-13
Payer: COMMERCIAL

## 2021-01-13 VITALS
DIASTOLIC BLOOD PRESSURE: 82 MMHG | BODY MASS INDEX: 47.58 KG/M2 | WEIGHT: 260.13 LBS | SYSTOLIC BLOOD PRESSURE: 132 MMHG

## 2021-01-13 DIAGNOSIS — Z3A.29 PREGNANCY WITH 29 COMPLETED WEEKS GESTATION: Primary | ICD-10-CM

## 2021-01-13 PROCEDURE — 0502F PR SUBSEQUENT PRENATAL CARE: ICD-10-PCS | Mod: S$GLB,,, | Performed by: OBSTETRICS & GYNECOLOGY

## 2021-01-13 PROCEDURE — 99999 PR PBB SHADOW E&M-EST. PATIENT-LVL II: CPT | Mod: PBBFAC,,, | Performed by: OBSTETRICS & GYNECOLOGY

## 2021-01-13 PROCEDURE — 0502F SUBSEQUENT PRENATAL CARE: CPT | Mod: S$GLB,,, | Performed by: OBSTETRICS & GYNECOLOGY

## 2021-01-13 PROCEDURE — 99999 PR PBB SHADOW E&M-EST. PATIENT-LVL II: ICD-10-PCS | Mod: PBBFAC,,, | Performed by: OBSTETRICS & GYNECOLOGY

## 2021-01-26 ENCOUNTER — ROUTINE PRENATAL (OUTPATIENT)
Dept: OBSTETRICS AND GYNECOLOGY | Facility: CLINIC | Age: 36
End: 2021-01-26
Payer: COMMERCIAL

## 2021-01-26 VITALS
WEIGHT: 258.69 LBS | DIASTOLIC BLOOD PRESSURE: 73 MMHG | SYSTOLIC BLOOD PRESSURE: 132 MMHG | BODY MASS INDEX: 47.32 KG/M2

## 2021-01-26 DIAGNOSIS — Z3A.31 PREGNANCY WITH 31 COMPLETED WEEKS GESTATION: Primary | ICD-10-CM

## 2021-01-26 PROCEDURE — 0502F PR SUBSEQUENT PRENATAL CARE: ICD-10-PCS | Mod: S$GLB,,, | Performed by: OBSTETRICS & GYNECOLOGY

## 2021-01-26 PROCEDURE — 99999 PR PBB SHADOW E&M-EST. PATIENT-LVL II: CPT | Mod: PBBFAC,,, | Performed by: OBSTETRICS & GYNECOLOGY

## 2021-01-26 PROCEDURE — 0502F SUBSEQUENT PRENATAL CARE: CPT | Mod: S$GLB,,, | Performed by: OBSTETRICS & GYNECOLOGY

## 2021-01-26 PROCEDURE — 99999 PR PBB SHADOW E&M-EST. PATIENT-LVL II: ICD-10-PCS | Mod: PBBFAC,,, | Performed by: OBSTETRICS & GYNECOLOGY

## 2021-02-04 ENCOUNTER — PROCEDURE VISIT (OUTPATIENT)
Dept: MATERNAL FETAL MEDICINE | Facility: CLINIC | Age: 36
End: 2021-02-04
Payer: COMMERCIAL

## 2021-02-04 DIAGNOSIS — Z36.89 ENCOUNTER FOR ULTRASOUND TO ASSESS FETAL GROWTH: ICD-10-CM

## 2021-02-04 DIAGNOSIS — O10.919 CHRONIC HYPERTENSION AFFECTING PREGNANCY: Primary | ICD-10-CM

## 2021-02-04 DIAGNOSIS — O99.210 OBESITY IN PREGNANCY: ICD-10-CM

## 2021-02-04 PROCEDURE — 76816 OB US FOLLOW-UP PER FETUS: CPT | Mod: S$GLB,,, | Performed by: OBSTETRICS & GYNECOLOGY

## 2021-02-04 PROCEDURE — 76819 PR US, OB, FETAL BIOPHYSICAL, W/O NST: ICD-10-PCS | Mod: S$GLB,,, | Performed by: OBSTETRICS & GYNECOLOGY

## 2021-02-04 PROCEDURE — 76819 FETAL BIOPHYS PROFIL W/O NST: CPT | Mod: S$GLB,,, | Performed by: OBSTETRICS & GYNECOLOGY

## 2021-02-04 PROCEDURE — 76816 PR  US,PREGNANT UTERUS,F/U,TRANSABD APP: ICD-10-PCS | Mod: S$GLB,,, | Performed by: OBSTETRICS & GYNECOLOGY

## 2021-02-09 ENCOUNTER — ROUTINE PRENATAL (OUTPATIENT)
Dept: OBSTETRICS AND GYNECOLOGY | Facility: CLINIC | Age: 36
End: 2021-02-09
Payer: COMMERCIAL

## 2021-02-09 VITALS
DIASTOLIC BLOOD PRESSURE: 82 MMHG | WEIGHT: 262.81 LBS | SYSTOLIC BLOOD PRESSURE: 124 MMHG | BODY MASS INDEX: 48.06 KG/M2

## 2021-02-09 DIAGNOSIS — Z3A.33 PREGNANCY WITH 33 COMPLETED WEEKS GESTATION: Primary | ICD-10-CM

## 2021-02-09 PROCEDURE — 0502F SUBSEQUENT PRENATAL CARE: CPT | Mod: S$GLB,,, | Performed by: OBSTETRICS & GYNECOLOGY

## 2021-02-09 PROCEDURE — 99999 PR PBB SHADOW E&M-EST. PATIENT-LVL II: CPT | Mod: PBBFAC,,, | Performed by: OBSTETRICS & GYNECOLOGY

## 2021-02-09 PROCEDURE — 0502F PR SUBSEQUENT PRENATAL CARE: ICD-10-PCS | Mod: S$GLB,,, | Performed by: OBSTETRICS & GYNECOLOGY

## 2021-02-09 PROCEDURE — 99999 PR PBB SHADOW E&M-EST. PATIENT-LVL II: ICD-10-PCS | Mod: PBBFAC,,, | Performed by: OBSTETRICS & GYNECOLOGY

## 2021-02-11 ENCOUNTER — HOSPITAL ENCOUNTER (OUTPATIENT)
Dept: PERINATAL CARE | Facility: OTHER | Age: 36
Discharge: HOME | End: 2021-02-11
Attending: OBSTETRICS & GYNECOLOGY
Payer: COMMERCIAL

## 2021-02-11 DIAGNOSIS — O10.919 CHRONIC HYPERTENSION AFFECTING PREGNANCY: ICD-10-CM

## 2021-02-11 DIAGNOSIS — O13.3 PREGNANCY-INDUCED HYPERTENSION IN THIRD TRIMESTER: Primary | ICD-10-CM

## 2021-02-11 PROCEDURE — 76818 FETAL BIOPHYS PROFILE W/NST: CPT

## 2021-02-11 PROCEDURE — 76819 PR US, OB, FETAL BIOPHYSICAL, W/O NST: ICD-10-PCS | Mod: 26,,, | Performed by: OBSTETRICS & GYNECOLOGY

## 2021-02-11 PROCEDURE — 76819 FETAL BIOPHYS PROFIL W/O NST: CPT | Mod: 26,,, | Performed by: OBSTETRICS & GYNECOLOGY

## 2021-02-17 ENCOUNTER — PATIENT MESSAGE (OUTPATIENT)
Dept: OBSTETRICS AND GYNECOLOGY | Facility: CLINIC | Age: 36
End: 2021-02-17

## 2021-02-18 ENCOUNTER — HOSPITAL ENCOUNTER (OUTPATIENT)
Dept: PERINATAL CARE | Facility: OTHER | Age: 36
Discharge: HOME | End: 2021-02-18
Attending: OBSTETRICS & GYNECOLOGY
Payer: COMMERCIAL

## 2021-02-18 DIAGNOSIS — O10.919 CHRONIC HYPERTENSION AFFECTING PREGNANCY: ICD-10-CM

## 2021-02-18 PROCEDURE — 59025 PR FETAL 2N-STRESS TEST: ICD-10-PCS | Mod: 26,,, | Performed by: OBSTETRICS & GYNECOLOGY

## 2021-02-18 PROCEDURE — 76815 PR  US,PREGNANT UTERUS,LIMITED, 1/> FETUSES: ICD-10-PCS | Mod: 26,,, | Performed by: OBSTETRICS & GYNECOLOGY

## 2021-02-18 PROCEDURE — 76815 OB US LIMITED FETUS(S): CPT

## 2021-02-18 PROCEDURE — 59025 FETAL NON-STRESS TEST: CPT | Mod: 26,,, | Performed by: OBSTETRICS & GYNECOLOGY

## 2021-02-18 PROCEDURE — 59025 FETAL NON-STRESS TEST: CPT

## 2021-02-18 PROCEDURE — 76815 OB US LIMITED FETUS(S): CPT | Mod: 26,,, | Performed by: OBSTETRICS & GYNECOLOGY

## 2021-02-22 ENCOUNTER — LAB VISIT (OUTPATIENT)
Dept: LAB | Facility: HOSPITAL | Age: 36
End: 2021-02-22
Attending: OBSTETRICS & GYNECOLOGY
Payer: COMMERCIAL

## 2021-02-22 ENCOUNTER — ROUTINE PRENATAL (OUTPATIENT)
Dept: OBSTETRICS AND GYNECOLOGY | Facility: CLINIC | Age: 36
End: 2021-02-22
Payer: COMMERCIAL

## 2021-02-22 VITALS
WEIGHT: 266.88 LBS | SYSTOLIC BLOOD PRESSURE: 142 MMHG | BODY MASS INDEX: 48.81 KG/M2 | DIASTOLIC BLOOD PRESSURE: 86 MMHG

## 2021-02-22 DIAGNOSIS — Z3A.35 PREGNANCY WITH 35 COMPLETED WEEKS GESTATION: ICD-10-CM

## 2021-02-22 DIAGNOSIS — Z3A.35 PREGNANCY WITH 35 COMPLETED WEEKS GESTATION: Primary | ICD-10-CM

## 2021-02-22 LAB
AST SERPL-CCNC: 16 U/L (ref 10–40)
BASOPHILS # BLD AUTO: 0.01 K/UL (ref 0–0.2)
BASOPHILS NFR BLD: 0.1 % (ref 0–1.9)
CREAT SERPL-MCNC: 0.7 MG/DL (ref 0.5–1.4)
DIFFERENTIAL METHOD: ABNORMAL
EOSINOPHIL # BLD AUTO: 0.1 K/UL (ref 0–0.5)
EOSINOPHIL NFR BLD: 0.7 % (ref 0–8)
ERYTHROCYTE [DISTWIDTH] IN BLOOD BY AUTOMATED COUNT: 24.1 % (ref 11.5–14.5)
EST. GFR  (AFRICAN AMERICAN): >60 ML/MIN/1.73 M^2
EST. GFR  (NON AFRICAN AMERICAN): >60 ML/MIN/1.73 M^2
HCT VFR BLD AUTO: 33.1 % (ref 37–48.5)
HGB BLD-MCNC: 10.6 G/DL (ref 12–16)
IMM GRANULOCYTES # BLD AUTO: 0.04 K/UL (ref 0–0.04)
IMM GRANULOCYTES NFR BLD AUTO: 0.5 % (ref 0–0.5)
LYMPHOCYTES # BLD AUTO: 1.5 K/UL (ref 1–4.8)
LYMPHOCYTES NFR BLD: 17.3 % (ref 18–48)
MCH RBC QN AUTO: 26 PG (ref 27–31)
MCHC RBC AUTO-ENTMCNC: 32 G/DL (ref 32–36)
MCV RBC AUTO: 81 FL (ref 82–98)
MONOCYTES # BLD AUTO: 0.7 K/UL (ref 0.3–1)
MONOCYTES NFR BLD: 8.2 % (ref 4–15)
NEUTROPHILS # BLD AUTO: 6.3 K/UL (ref 1.8–7.7)
NEUTROPHILS NFR BLD: 73.2 % (ref 38–73)
NRBC BLD-RTO: 0 /100 WBC
PLATELET # BLD AUTO: 260 K/UL (ref 150–350)
PMV BLD AUTO: 10.3 FL (ref 9.2–12.9)
RBC # BLD AUTO: 4.07 M/UL (ref 4–5.4)
WBC # BLD AUTO: 8.62 K/UL (ref 3.9–12.7)

## 2021-02-22 PROCEDURE — 0502F PR SUBSEQUENT PRENATAL CARE: ICD-10-PCS | Mod: S$GLB,,, | Performed by: OBSTETRICS & GYNECOLOGY

## 2021-02-22 PROCEDURE — 0502F SUBSEQUENT PRENATAL CARE: CPT | Mod: S$GLB,,, | Performed by: OBSTETRICS & GYNECOLOGY

## 2021-02-22 PROCEDURE — 99999 PR PBB SHADOW E&M-EST. PATIENT-LVL II: ICD-10-PCS | Mod: PBBFAC,,, | Performed by: OBSTETRICS & GYNECOLOGY

## 2021-02-22 PROCEDURE — 85025 COMPLETE CBC W/AUTO DIFF WBC: CPT

## 2021-02-22 PROCEDURE — 86592 SYPHILIS TEST NON-TREP QUAL: CPT

## 2021-02-22 PROCEDURE — 82570 ASSAY OF URINE CREATININE: CPT

## 2021-02-22 PROCEDURE — 86703 HIV-1/HIV-2 1 RESULT ANTBDY: CPT

## 2021-02-22 PROCEDURE — 84450 TRANSFERASE (AST) (SGOT): CPT

## 2021-02-22 PROCEDURE — 99999 PR PBB SHADOW E&M-EST. PATIENT-LVL II: CPT | Mod: PBBFAC,,, | Performed by: OBSTETRICS & GYNECOLOGY

## 2021-02-22 PROCEDURE — 82565 ASSAY OF CREATININE: CPT

## 2021-02-23 LAB
CREAT UR-MCNC: 121.2 MG/DL (ref 15–325)
HIV 1+2 AB+HIV1 P24 AG SERPL QL IA: NEGATIVE
PROT UR-MCNC: 11 MG/DL
PROT/CREAT UR: 0.09 MG/G{CREAT} (ref 0–0.2)

## 2021-02-24 LAB — RPR SER QL: NORMAL

## 2021-02-25 ENCOUNTER — HOSPITAL ENCOUNTER (OUTPATIENT)
Dept: PERINATAL CARE | Facility: OTHER | Age: 36
Discharge: HOME | End: 2021-02-25
Attending: OBSTETRICS & GYNECOLOGY
Payer: COMMERCIAL

## 2021-02-25 DIAGNOSIS — O10.919 CHRONIC HYPERTENSION AFFECTING PREGNANCY: ICD-10-CM

## 2021-02-25 PROCEDURE — 59025 PR FETAL 2N-STRESS TEST: ICD-10-PCS | Mod: 26,,, | Performed by: OBSTETRICS & GYNECOLOGY

## 2021-02-25 PROCEDURE — 76815 PR  US,PREGNANT UTERUS,LIMITED, 1/> FETUSES: ICD-10-PCS | Mod: 26,,, | Performed by: OBSTETRICS & GYNECOLOGY

## 2021-02-25 PROCEDURE — 76815 OB US LIMITED FETUS(S): CPT | Mod: 26,,, | Performed by: OBSTETRICS & GYNECOLOGY

## 2021-02-25 PROCEDURE — 59025 FETAL NON-STRESS TEST: CPT | Mod: 26,,, | Performed by: OBSTETRICS & GYNECOLOGY

## 2021-02-27 ENCOUNTER — PATIENT MESSAGE (OUTPATIENT)
Dept: OBSTETRICS AND GYNECOLOGY | Facility: HOSPITAL | Age: 36
End: 2021-02-27

## 2021-02-27 DIAGNOSIS — O13.3 GESTATIONAL HYPERTENSION, THIRD TRIMESTER: Primary | ICD-10-CM

## 2021-03-01 ENCOUNTER — LAB VISIT (OUTPATIENT)
Dept: LAB | Facility: HOSPITAL | Age: 36
End: 2021-03-01
Attending: OBSTETRICS & GYNECOLOGY
Payer: COMMERCIAL

## 2021-03-01 ENCOUNTER — ROUTINE PRENATAL (OUTPATIENT)
Dept: OBSTETRICS AND GYNECOLOGY | Facility: CLINIC | Age: 36
End: 2021-03-01
Payer: COMMERCIAL

## 2021-03-01 VITALS — DIASTOLIC BLOOD PRESSURE: 78 MMHG | WEIGHT: 268.5 LBS | BODY MASS INDEX: 49.11 KG/M2 | SYSTOLIC BLOOD PRESSURE: 138 MMHG

## 2021-03-01 DIAGNOSIS — O13.3 GESTATIONAL HYPERTENSION, THIRD TRIMESTER: ICD-10-CM

## 2021-03-01 DIAGNOSIS — Z3A.36 PREGNANCY WITH 36 COMPLETED WEEKS GESTATION: Primary | ICD-10-CM

## 2021-03-01 DIAGNOSIS — O16.3 HYPERTENSION AFFECTING PREGNANCY IN THIRD TRIMESTER: ICD-10-CM

## 2021-03-01 LAB
AST SERPL-CCNC: 17 U/L (ref 10–40)
BASOPHILS # BLD AUTO: 0.03 K/UL (ref 0–0.2)
BASOPHILS NFR BLD: 0.3 % (ref 0–1.9)
CREAT SERPL-MCNC: 0.7 MG/DL (ref 0.5–1.4)
DIFFERENTIAL METHOD: ABNORMAL
EOSINOPHIL # BLD AUTO: 0.1 K/UL (ref 0–0.5)
EOSINOPHIL NFR BLD: 0.9 % (ref 0–8)
ERYTHROCYTE [DISTWIDTH] IN BLOOD BY AUTOMATED COUNT: 24.3 % (ref 11.5–14.5)
EST. GFR  (AFRICAN AMERICAN): >60 ML/MIN/1.73 M^2
EST. GFR  (NON AFRICAN AMERICAN): >60 ML/MIN/1.73 M^2
HCT VFR BLD AUTO: 32.2 % (ref 37–48.5)
HGB BLD-MCNC: 10.4 G/DL (ref 12–16)
IMM GRANULOCYTES # BLD AUTO: 0.04 K/UL (ref 0–0.04)
IMM GRANULOCYTES NFR BLD AUTO: 0.5 % (ref 0–0.5)
LYMPHOCYTES # BLD AUTO: 1.6 K/UL (ref 1–4.8)
LYMPHOCYTES NFR BLD: 18.5 % (ref 18–48)
MCH RBC QN AUTO: 26.6 PG (ref 27–31)
MCHC RBC AUTO-ENTMCNC: 32.3 G/DL (ref 32–36)
MCV RBC AUTO: 82 FL (ref 82–98)
MONOCYTES # BLD AUTO: 0.8 K/UL (ref 0.3–1)
MONOCYTES NFR BLD: 9.4 % (ref 4–15)
NEUTROPHILS # BLD AUTO: 6.1 K/UL (ref 1.8–7.7)
NEUTROPHILS NFR BLD: 70.4 % (ref 38–73)
NRBC BLD-RTO: 0 /100 WBC
PLATELET # BLD AUTO: 234 K/UL (ref 150–350)
PMV BLD AUTO: 10.2 FL (ref 9.2–12.9)
RBC # BLD AUTO: 3.91 M/UL (ref 4–5.4)
WBC # BLD AUTO: 8.69 K/UL (ref 3.9–12.7)

## 2021-03-01 PROCEDURE — 82570 ASSAY OF URINE CREATININE: CPT

## 2021-03-01 PROCEDURE — 99999 PR PBB SHADOW E&M-EST. PATIENT-LVL III: CPT | Mod: PBBFAC,,, | Performed by: OBSTETRICS & GYNECOLOGY

## 2021-03-01 PROCEDURE — 0502F SUBSEQUENT PRENATAL CARE: CPT | Mod: S$GLB,,, | Performed by: OBSTETRICS & GYNECOLOGY

## 2021-03-01 PROCEDURE — 85025 COMPLETE CBC W/AUTO DIFF WBC: CPT

## 2021-03-01 PROCEDURE — 87081 CULTURE SCREEN ONLY: CPT

## 2021-03-01 PROCEDURE — 82565 ASSAY OF CREATININE: CPT

## 2021-03-01 PROCEDURE — 87147 CULTURE TYPE IMMUNOLOGIC: CPT | Performed by: OBSTETRICS & GYNECOLOGY

## 2021-03-01 PROCEDURE — 99999 PR PBB SHADOW E&M-EST. PATIENT-LVL III: ICD-10-PCS | Mod: PBBFAC,,, | Performed by: OBSTETRICS & GYNECOLOGY

## 2021-03-01 PROCEDURE — 0502F PR SUBSEQUENT PRENATAL CARE: ICD-10-PCS | Mod: S$GLB,,, | Performed by: OBSTETRICS & GYNECOLOGY

## 2021-03-01 PROCEDURE — 84450 TRANSFERASE (AST) (SGOT): CPT

## 2021-03-02 LAB
CREAT UR-MCNC: 90.6 MG/DL (ref 15–325)
PROT UR-MCNC: <7 MG/DL
PROT/CREAT UR: NORMAL MG/G{CREAT} (ref 0–0.2)

## 2021-03-04 ENCOUNTER — PROCEDURE VISIT (OUTPATIENT)
Dept: MATERNAL FETAL MEDICINE | Facility: CLINIC | Age: 36
End: 2021-03-04
Payer: COMMERCIAL

## 2021-03-04 DIAGNOSIS — Z36.89 ENCOUNTER FOR ULTRASOUND TO ASSESS FETAL GROWTH: ICD-10-CM

## 2021-03-04 DIAGNOSIS — O34.211 ENCOUNTER FOR MATERNAL CARE FOR LOW TRANSVERSE SCAR FROM REPEAT CESAREAN DELIVERY: ICD-10-CM

## 2021-03-04 DIAGNOSIS — O10.919 CHRONIC HYPERTENSION AFFECTING PREGNANCY: ICD-10-CM

## 2021-03-04 DIAGNOSIS — O99.213 OBESITY AFFECTING PREGNANCY IN THIRD TRIMESTER: ICD-10-CM

## 2021-03-04 DIAGNOSIS — O34.219 PREGNANCY WITH HISTORY OF CESAREAN SECTION, ANTEPARTUM: Primary | ICD-10-CM

## 2021-03-04 DIAGNOSIS — O16.3 HYPERTENSION AFFECTING PREGNANCY IN THIRD TRIMESTER: ICD-10-CM

## 2021-03-04 DIAGNOSIS — O99.210 OBESITY IN PREGNANCY: ICD-10-CM

## 2021-03-04 PROBLEM — O99.820 GBS (GROUP B STREPTOCOCCUS CARRIER), +RV CULTURE, CURRENTLY PREGNANT: Status: ACTIVE | Noted: 2021-03-04

## 2021-03-04 LAB — BACTERIA SPEC AEROBE CULT: ABNORMAL

## 2021-03-04 PROCEDURE — 76816 PR  US,PREGNANT UTERUS,F/U,TRANSABD APP: ICD-10-PCS | Mod: S$GLB,,, | Performed by: OBSTETRICS & GYNECOLOGY

## 2021-03-04 PROCEDURE — 76819 FETAL BIOPHYS PROFIL W/O NST: CPT | Mod: S$GLB,,, | Performed by: OBSTETRICS & GYNECOLOGY

## 2021-03-04 PROCEDURE — 76819 PR US, OB, FETAL BIOPHYSICAL, W/O NST: ICD-10-PCS | Mod: S$GLB,,, | Performed by: OBSTETRICS & GYNECOLOGY

## 2021-03-04 PROCEDURE — 76816 OB US FOLLOW-UP PER FETUS: CPT | Mod: S$GLB,,, | Performed by: OBSTETRICS & GYNECOLOGY

## 2021-03-04 RX ORDER — ACETAMINOPHEN 325 MG/1
650 TABLET ORAL
Status: CANCELLED | OUTPATIENT
Start: 2021-03-04

## 2021-03-04 RX ORDER — OXYTOCIN/RINGER'S LACTATE 30/500 ML
95 PLASTIC BAG, INJECTION (ML) INTRAVENOUS CONTINUOUS
Status: CANCELLED | OUTPATIENT
Start: 2021-03-04

## 2021-03-04 RX ORDER — SODIUM CITRATE AND CITRIC ACID MONOHYDRATE 334; 500 MG/5ML; MG/5ML
30 SOLUTION ORAL
Status: CANCELLED | OUTPATIENT
Start: 2021-03-04

## 2021-03-04 RX ORDER — ACETAMINOPHEN 500 MG
1000 TABLET ORAL
Status: CANCELLED | OUTPATIENT
Start: 2021-03-04

## 2021-03-04 RX ORDER — SODIUM CHLORIDE, SODIUM LACTATE, POTASSIUM CHLORIDE, CALCIUM CHLORIDE 600; 310; 30; 20 MG/100ML; MG/100ML; MG/100ML; MG/100ML
INJECTION, SOLUTION INTRAVENOUS CONTINUOUS
Status: CANCELLED | OUTPATIENT
Start: 2021-03-04

## 2021-03-04 RX ORDER — SIMETHICONE 80 MG
1 TABLET,CHEWABLE ORAL EVERY 6 HOURS PRN
Status: CANCELLED | OUTPATIENT
Start: 2021-03-04

## 2021-03-04 RX ORDER — MISOPROSTOL 100 UG/1
800 TABLET ORAL ONCE AS NEEDED
Status: CANCELLED | OUTPATIENT
Start: 2021-03-04 | End: 2032-07-31

## 2021-03-04 RX ORDER — FAMOTIDINE 10 MG/ML
20 INJECTION INTRAVENOUS
Status: CANCELLED | OUTPATIENT
Start: 2021-03-04

## 2021-03-04 RX ORDER — OXYCODONE HYDROCHLORIDE 5 MG/1
5 TABLET ORAL EVERY 4 HOURS PRN
Status: CANCELLED | OUTPATIENT
Start: 2021-03-04

## 2021-03-04 RX ORDER — HYDROCORTISONE 25 MG/G
CREAM TOPICAL 3 TIMES DAILY PRN
Status: CANCELLED | OUTPATIENT
Start: 2021-03-04

## 2021-03-04 RX ORDER — AMOXICILLIN 250 MG
1 CAPSULE ORAL NIGHTLY PRN
Status: CANCELLED | OUTPATIENT
Start: 2021-03-04

## 2021-03-04 RX ORDER — ONDANSETRON 2 MG/ML
4 INJECTION INTRAMUSCULAR; INTRAVENOUS EVERY 6 HOURS PRN
Status: CANCELLED | OUTPATIENT
Start: 2021-03-04

## 2021-03-04 RX ORDER — NALBUPHINE HYDROCHLORIDE 10 MG/ML
5 INJECTION, SOLUTION INTRAMUSCULAR; INTRAVENOUS; SUBCUTANEOUS ONCE AS NEEDED
Status: CANCELLED | OUTPATIENT
Start: 2021-03-04 | End: 2032-07-31

## 2021-03-04 RX ORDER — ENOXAPARIN SODIUM 100 MG/ML
40 INJECTION SUBCUTANEOUS EVERY 24 HOURS
Status: CANCELLED | OUTPATIENT
Start: 2021-03-04

## 2021-03-04 RX ORDER — DIPHENHYDRAMINE HCL 25 MG
25 CAPSULE ORAL EVERY 6 HOURS PRN
Status: CANCELLED | OUTPATIENT
Start: 2021-03-04

## 2021-03-04 RX ORDER — KETOROLAC TROMETHAMINE 30 MG/ML
30 INJECTION, SOLUTION INTRAMUSCULAR; INTRAVENOUS
Status: CANCELLED | OUTPATIENT
Start: 2021-03-04 | End: 2021-03-05

## 2021-03-04 RX ORDER — DIPHENHYDRAMINE HYDROCHLORIDE 50 MG/ML
12.5 INJECTION INTRAMUSCULAR; INTRAVENOUS
Status: CANCELLED | OUTPATIENT
Start: 2021-03-04

## 2021-03-04 RX ORDER — PROCHLORPERAZINE EDISYLATE 5 MG/ML
5 INJECTION INTRAMUSCULAR; INTRAVENOUS EVERY 6 HOURS PRN
Status: CANCELLED | OUTPATIENT
Start: 2021-03-04

## 2021-03-04 RX ORDER — DIPHENOXYLATE HYDROCHLORIDE AND ATROPINE SULFATE 2.5; .025 MG/1; MG/1
2 TABLET ORAL EVERY 6 HOURS PRN
Status: CANCELLED | OUTPATIENT
Start: 2021-03-04

## 2021-03-04 RX ORDER — IBUPROFEN 200 MG
800 TABLET ORAL
Status: CANCELLED | OUTPATIENT
Start: 2021-03-05

## 2021-03-04 RX ORDER — DOCUSATE SODIUM 100 MG/1
200 CAPSULE, LIQUID FILLED ORAL 2 TIMES DAILY
Status: CANCELLED | OUTPATIENT
Start: 2021-03-04

## 2021-03-04 RX ORDER — CARBOPROST TROMETHAMINE 250 UG/ML
250 INJECTION, SOLUTION INTRAMUSCULAR
Status: CANCELLED | OUTPATIENT
Start: 2021-03-04

## 2021-03-04 RX ORDER — OXYCODONE HYDROCHLORIDE 5 MG/1
10 TABLET ORAL EVERY 4 HOURS PRN
Status: CANCELLED | OUTPATIENT
Start: 2021-03-04

## 2021-03-04 RX ORDER — ONDANSETRON 4 MG/1
8 TABLET, ORALLY DISINTEGRATING ORAL EVERY 8 HOURS PRN
Status: CANCELLED | OUTPATIENT
Start: 2021-03-04

## 2021-03-04 RX ORDER — PRENATAL WITH FERROUS FUM AND FOLIC ACID 3080; 920; 120; 400; 22; 1.84; 3; 20; 10; 1; 12; 200; 27; 25; 2 [IU]/1; [IU]/1; MG/1; [IU]/1; MG/1; MG/1; MG/1; MG/1; MG/1; MG/1; UG/1; MG/1; MG/1; MG/1; MG/1
1 TABLET ORAL DAILY
Status: CANCELLED | OUTPATIENT
Start: 2021-03-05

## 2021-03-08 ENCOUNTER — LAB VISIT (OUTPATIENT)
Dept: LAB | Facility: HOSPITAL | Age: 36
End: 2021-03-08
Attending: OBSTETRICS & GYNECOLOGY
Payer: COMMERCIAL

## 2021-03-08 ENCOUNTER — ROUTINE PRENATAL (OUTPATIENT)
Dept: OBSTETRICS AND GYNECOLOGY | Facility: CLINIC | Age: 36
End: 2021-03-08
Payer: COMMERCIAL

## 2021-03-08 VITALS
WEIGHT: 267.88 LBS | SYSTOLIC BLOOD PRESSURE: 144 MMHG | BODY MASS INDEX: 48.99 KG/M2 | DIASTOLIC BLOOD PRESSURE: 86 MMHG

## 2021-03-08 DIAGNOSIS — O13.3 PREGNANCY-INDUCED HYPERTENSION IN THIRD TRIMESTER: ICD-10-CM

## 2021-03-08 DIAGNOSIS — Z3A.37 PREGNANCY WITH 37 WEEKS COMPLETED GESTATION: Primary | ICD-10-CM

## 2021-03-08 DIAGNOSIS — Z20.822 ENCOUNTER FOR LABORATORY TESTING FOR COVID-19 VIRUS: ICD-10-CM

## 2021-03-08 LAB
AST SERPL-CCNC: 18 U/L (ref 10–40)
BASOPHILS # BLD AUTO: 0.01 K/UL (ref 0–0.2)
BASOPHILS NFR BLD: 0.1 % (ref 0–1.9)
CREAT SERPL-MCNC: 0.7 MG/DL (ref 0.5–1.4)
DIFFERENTIAL METHOD: ABNORMAL
EOSINOPHIL # BLD AUTO: 0.1 K/UL (ref 0–0.5)
EOSINOPHIL NFR BLD: 0.6 % (ref 0–8)
ERYTHROCYTE [DISTWIDTH] IN BLOOD BY AUTOMATED COUNT: ABNORMAL % (ref 11.5–14.5)
EST. GFR  (AFRICAN AMERICAN): >60 ML/MIN/1.73 M^2
EST. GFR  (NON AFRICAN AMERICAN): >60 ML/MIN/1.73 M^2
HCT VFR BLD AUTO: 33.6 % (ref 37–48.5)
HGB BLD-MCNC: 10.8 G/DL (ref 12–16)
IMM GRANULOCYTES # BLD AUTO: 0.03 K/UL (ref 0–0.04)
IMM GRANULOCYTES NFR BLD AUTO: 0.4 % (ref 0–0.5)
LYMPHOCYTES # BLD AUTO: 1.7 K/UL (ref 1–4.8)
LYMPHOCYTES NFR BLD: 19.9 % (ref 18–48)
MCH RBC QN AUTO: 26.4 PG (ref 27–31)
MCHC RBC AUTO-ENTMCNC: 32.1 G/DL (ref 32–36)
MCV RBC AUTO: 82 FL (ref 82–98)
MONOCYTES # BLD AUTO: 0.7 K/UL (ref 0.3–1)
MONOCYTES NFR BLD: 8.2 % (ref 4–15)
NEUTROPHILS # BLD AUTO: 5.9 K/UL (ref 1.8–7.7)
NEUTROPHILS NFR BLD: 70.8 % (ref 38–73)
NRBC BLD-RTO: 0 /100 WBC
PLATELET # BLD AUTO: 276 K/UL (ref 150–350)
PMV BLD AUTO: 10.1 FL (ref 9.2–12.9)
RBC # BLD AUTO: 4.09 M/UL (ref 4–5.4)
SPHEROCYTES BLD QL SMEAR: ABNORMAL
WBC # BLD AUTO: 8.31 K/UL (ref 3.9–12.7)

## 2021-03-08 PROCEDURE — 99999 PR PBB SHADOW E&M-EST. PATIENT-LVL II: ICD-10-PCS | Mod: PBBFAC,,, | Performed by: OBSTETRICS & GYNECOLOGY

## 2021-03-08 PROCEDURE — 36415 COLL VENOUS BLD VENIPUNCTURE: CPT | Performed by: OBSTETRICS & GYNECOLOGY

## 2021-03-08 PROCEDURE — 0502F PR SUBSEQUENT PRENATAL CARE: ICD-10-PCS | Mod: S$GLB,,, | Performed by: OBSTETRICS & GYNECOLOGY

## 2021-03-08 PROCEDURE — 0502F SUBSEQUENT PRENATAL CARE: CPT | Mod: S$GLB,,, | Performed by: OBSTETRICS & GYNECOLOGY

## 2021-03-08 PROCEDURE — 82565 ASSAY OF CREATININE: CPT | Performed by: OBSTETRICS & GYNECOLOGY

## 2021-03-08 PROCEDURE — 99999 PR PBB SHADOW E&M-EST. PATIENT-LVL II: CPT | Mod: PBBFAC,,, | Performed by: OBSTETRICS & GYNECOLOGY

## 2021-03-08 PROCEDURE — 85025 COMPLETE CBC W/AUTO DIFF WBC: CPT | Performed by: OBSTETRICS & GYNECOLOGY

## 2021-03-08 PROCEDURE — 84450 TRANSFERASE (AST) (SGOT): CPT | Performed by: OBSTETRICS & GYNECOLOGY

## 2021-03-10 ENCOUNTER — ANESTHESIA (OUTPATIENT)
Dept: OBSTETRICS AND GYNECOLOGY | Facility: HOSPITAL | Age: 36
End: 2021-03-10
Payer: COMMERCIAL

## 2021-03-10 ENCOUNTER — ANESTHESIA EVENT (OUTPATIENT)
Dept: OBSTETRICS AND GYNECOLOGY | Facility: HOSPITAL | Age: 36
End: 2021-03-10
Payer: COMMERCIAL

## 2021-03-10 ENCOUNTER — HOSPITAL ENCOUNTER (INPATIENT)
Facility: HOSPITAL | Age: 36
LOS: 3 days | Discharge: HOME | End: 2021-03-13
Attending: OBSTETRICS & GYNECOLOGY | Admitting: OBSTETRICS & GYNECOLOGY
Payer: COMMERCIAL

## 2021-03-10 DIAGNOSIS — O10.919 CHRONIC HYPERTENSION AFFECTING PREGNANCY: ICD-10-CM

## 2021-03-10 DIAGNOSIS — Z98.891 S/P REPEAT LOW TRANSVERSE C-SECTION: Primary | ICD-10-CM

## 2021-03-10 DIAGNOSIS — O34.211 ENCOUNTER FOR MATERNAL CARE FOR LOW TRANSVERSE SCAR FROM REPEAT CESAREAN DELIVERY: ICD-10-CM

## 2021-03-10 DIAGNOSIS — O34.219 PREGNANCY WITH HISTORY OF CESAREAN SECTION, ANTEPARTUM: ICD-10-CM

## 2021-03-10 DIAGNOSIS — E66.01 MORBID OBESITY: ICD-10-CM

## 2021-03-10 LAB
ABO + RH BLD: NORMAL
ANISOCYTOSIS BLD QL SMEAR: SLIGHT
BASOPHILS # BLD AUTO: 0.02 K/UL (ref 0–0.2)
BASOPHILS NFR BLD: 0.3 % (ref 0–1.9)
BLD GP AB SCN CELLS X3 SERPL QL: NORMAL
DIFFERENTIAL METHOD: ABNORMAL
EOSINOPHIL # BLD AUTO: 0.1 K/UL (ref 0–0.5)
EOSINOPHIL NFR BLD: 0.8 % (ref 0–8)
ERYTHROCYTE [DISTWIDTH] IN BLOOD BY AUTOMATED COUNT: ABNORMAL % (ref 11.5–14.5)
HCT VFR BLD AUTO: 33.1 % (ref 37–48.5)
HGB BLD-MCNC: 10.8 G/DL (ref 12–16)
IMM GRANULOCYTES # BLD AUTO: 0.03 K/UL (ref 0–0.04)
IMM GRANULOCYTES NFR BLD AUTO: 0.4 % (ref 0–0.5)
LYMPHOCYTES # BLD AUTO: 1.5 K/UL (ref 1–4.8)
LYMPHOCYTES NFR BLD: 19.3 % (ref 18–48)
MCH RBC QN AUTO: 26.7 PG (ref 27–31)
MCHC RBC AUTO-ENTMCNC: 32.6 G/DL (ref 32–36)
MCV RBC AUTO: 82 FL (ref 82–98)
MONOCYTES # BLD AUTO: 0.8 K/UL (ref 0.3–1)
MONOCYTES NFR BLD: 9.5 % (ref 4–15)
NEUTROPHILS # BLD AUTO: 5.6 K/UL (ref 1.8–7.7)
NEUTROPHILS NFR BLD: 69.7 % (ref 38–73)
NRBC BLD-RTO: 0 /100 WBC
PLATELET # BLD AUTO: 271 K/UL (ref 150–350)
PLATELET BLD QL SMEAR: ABNORMAL
PMV BLD AUTO: 10.9 FL (ref 9.2–12.9)
POLYCHROMASIA BLD QL SMEAR: ABNORMAL
RBC # BLD AUTO: 4.05 M/UL (ref 4–5.4)
SARS-COV-2 RDRP RESP QL NAA+PROBE: NEGATIVE
WBC # BLD AUTO: 7.98 K/UL (ref 3.9–12.7)

## 2021-03-10 PROCEDURE — 86900 BLOOD TYPING SEROLOGIC ABO: CPT | Performed by: OBSTETRICS & GYNECOLOGY

## 2021-03-10 PROCEDURE — 59514 PR CESAREAN DELIVERY ONLY: ICD-10-PCS | Mod: 80,,, | Performed by: OBSTETRICS & GYNECOLOGY

## 2021-03-10 PROCEDURE — 37000009 HC ANESTHESIA EA ADD 15 MINS: Performed by: OBSTETRICS & GYNECOLOGY

## 2021-03-10 PROCEDURE — 63600175 PHARM REV CODE 636 W HCPCS: Performed by: OBSTETRICS & GYNECOLOGY

## 2021-03-10 PROCEDURE — 25000003 PHARM REV CODE 250: Performed by: STUDENT IN AN ORGANIZED HEALTH CARE EDUCATION/TRAINING PROGRAM

## 2021-03-10 PROCEDURE — 27100025 HC TUBING, SET FLUID WARMER: Performed by: NURSE ANESTHETIST, CERTIFIED REGISTERED

## 2021-03-10 PROCEDURE — 11000001 HC ACUTE MED/SURG PRIVATE ROOM

## 2021-03-10 PROCEDURE — 59510 CESAREAN DELIVERY: CPT | Mod: ,,, | Performed by: OBSTETRICS & GYNECOLOGY

## 2021-03-10 PROCEDURE — 36004724 HC OB OR TIME LEV III - 1ST 15 MIN: Performed by: OBSTETRICS & GYNECOLOGY

## 2021-03-10 PROCEDURE — 59514 PRA REAN DELIVERY ONLY: ICD-10-PCS | Mod: ,,, | Performed by: ANESTHESIOLOGY

## 2021-03-10 PROCEDURE — 25000003 PHARM REV CODE 250: Performed by: OBSTETRICS & GYNECOLOGY

## 2021-03-10 PROCEDURE — 59514 CESAREAN DELIVERY ONLY: CPT | Mod: 80,,, | Performed by: OBSTETRICS & GYNECOLOGY

## 2021-03-10 PROCEDURE — 51702 INSERT TEMP BLADDER CATH: CPT

## 2021-03-10 PROCEDURE — 86592 SYPHILIS TEST NON-TREP QUAL: CPT | Performed by: OBSTETRICS & GYNECOLOGY

## 2021-03-10 PROCEDURE — 85025 COMPLETE CBC W/AUTO DIFF WBC: CPT | Performed by: OBSTETRICS & GYNECOLOGY

## 2021-03-10 PROCEDURE — 59510 PR FULL ROUT OBSTE CARE,CESAREAN DELIV: ICD-10-PCS | Mod: ,,, | Performed by: OBSTETRICS & GYNECOLOGY

## 2021-03-10 PROCEDURE — U0002 COVID-19 LAB TEST NON-CDC: HCPCS | Performed by: OBSTETRICS & GYNECOLOGY

## 2021-03-10 PROCEDURE — 63600175 PHARM REV CODE 636 W HCPCS: Performed by: ANESTHESIOLOGY

## 2021-03-10 PROCEDURE — 36004725 HC OB OR TIME LEV III - EA ADD 15 MIN: Performed by: OBSTETRICS & GYNECOLOGY

## 2021-03-10 PROCEDURE — 25000003 PHARM REV CODE 250: Performed by: ANESTHESIOLOGY

## 2021-03-10 PROCEDURE — 36000685 HC CESAREAN SECTION LEVEL I

## 2021-03-10 PROCEDURE — 27200688 HC TRAY, SPINAL-HYPER/ ISOBARIC: Performed by: NURSE ANESTHETIST, CERTIFIED REGISTERED

## 2021-03-10 PROCEDURE — 36415 COLL VENOUS BLD VENIPUNCTURE: CPT | Performed by: OBSTETRICS & GYNECOLOGY

## 2021-03-10 PROCEDURE — 37000008 HC ANESTHESIA 1ST 15 MINUTES: Performed by: OBSTETRICS & GYNECOLOGY

## 2021-03-10 PROCEDURE — 59514 CESAREAN DELIVERY ONLY: CPT | Mod: ,,, | Performed by: ANESTHESIOLOGY

## 2021-03-10 PROCEDURE — 63600175 PHARM REV CODE 636 W HCPCS: Performed by: STUDENT IN AN ORGANIZED HEALTH CARE EDUCATION/TRAINING PROGRAM

## 2021-03-10 RX ORDER — CARBOPROST TROMETHAMINE 250 UG/ML
250 INJECTION, SOLUTION INTRAMUSCULAR
Status: DISCONTINUED | OUTPATIENT
Start: 2021-03-10 | End: 2021-03-13 | Stop reason: HOSPADM

## 2021-03-10 RX ORDER — OXYTOCIN/RINGER'S LACTATE 30/500 ML
95 PLASTIC BAG, INJECTION (ML) INTRAVENOUS CONTINUOUS
Status: DISCONTINUED | OUTPATIENT
Start: 2021-03-10 | End: 2021-03-13 | Stop reason: HOSPADM

## 2021-03-10 RX ORDER — DIPHENHYDRAMINE HCL 25 MG
25 CAPSULE ORAL EVERY 6 HOURS PRN
Status: DISCONTINUED | OUTPATIENT
Start: 2021-03-10 | End: 2021-03-13 | Stop reason: HOSPADM

## 2021-03-10 RX ORDER — FENTANYL CITRATE 50 UG/ML
INJECTION, SOLUTION INTRAMUSCULAR; INTRAVENOUS
Status: DISCONTINUED | OUTPATIENT
Start: 2021-03-10 | End: 2021-03-10

## 2021-03-10 RX ORDER — ONDANSETRON HYDROCHLORIDE 2 MG/ML
INJECTION, SOLUTION INTRAMUSCULAR; INTRAVENOUS
Status: DISCONTINUED | OUTPATIENT
Start: 2021-03-10 | End: 2021-03-10

## 2021-03-10 RX ORDER — MISOPROSTOL 200 UG/1
800 TABLET ORAL ONCE AS NEEDED
Status: DISCONTINUED | OUTPATIENT
Start: 2021-03-10 | End: 2021-03-13 | Stop reason: HOSPADM

## 2021-03-10 RX ORDER — OXYCODONE HYDROCHLORIDE 5 MG/1
5 TABLET ORAL EVERY 4 HOURS PRN
Status: DISCONTINUED | OUTPATIENT
Start: 2021-03-10 | End: 2021-03-13

## 2021-03-10 RX ORDER — SIMETHICONE 80 MG
1 TABLET,CHEWABLE ORAL EVERY 6 HOURS PRN
Status: DISCONTINUED | OUTPATIENT
Start: 2021-03-10 | End: 2021-03-13 | Stop reason: HOSPADM

## 2021-03-10 RX ORDER — SODIUM CHLORIDE, SODIUM LACTATE, POTASSIUM CHLORIDE, CALCIUM CHLORIDE 600; 310; 30; 20 MG/100ML; MG/100ML; MG/100ML; MG/100ML
INJECTION, SOLUTION INTRAVENOUS CONTINUOUS
Status: DISCONTINUED | OUTPATIENT
Start: 2021-03-10 | End: 2021-03-13

## 2021-03-10 RX ORDER — IBUPROFEN 400 MG/1
800 TABLET ORAL
Status: DISCONTINUED | OUTPATIENT
Start: 2021-03-11 | End: 2021-03-13 | Stop reason: HOSPADM

## 2021-03-10 RX ORDER — PRENATAL WITH FERROUS FUM AND FOLIC ACID 3080; 920; 120; 400; 22; 1.84; 3; 20; 10; 1; 12; 200; 27; 25; 2 [IU]/1; [IU]/1; MG/1; [IU]/1; MG/1; MG/1; MG/1; MG/1; MG/1; MG/1; UG/1; MG/1; MG/1; MG/1; MG/1
1 TABLET ORAL DAILY
Status: DISCONTINUED | OUTPATIENT
Start: 2021-03-10 | End: 2021-03-13 | Stop reason: HOSPADM

## 2021-03-10 RX ORDER — SODIUM CITRATE AND CITRIC ACID MONOHYDRATE 334; 500 MG/5ML; MG/5ML
30 SOLUTION ORAL
Status: DISCONTINUED | OUTPATIENT
Start: 2021-03-10 | End: 2021-03-13 | Stop reason: HOSPADM

## 2021-03-10 RX ORDER — ONDANSETRON 8 MG/1
8 TABLET, ORALLY DISINTEGRATING ORAL EVERY 8 HOURS PRN
Status: DISCONTINUED | OUTPATIENT
Start: 2021-03-10 | End: 2021-03-13 | Stop reason: HOSPADM

## 2021-03-10 RX ORDER — ONDANSETRON 2 MG/ML
4 INJECTION INTRAMUSCULAR; INTRAVENOUS EVERY 6 HOURS PRN
Status: DISCONTINUED | OUTPATIENT
Start: 2021-03-10 | End: 2021-03-13 | Stop reason: HOSPADM

## 2021-03-10 RX ORDER — FAMOTIDINE 10 MG/ML
20 INJECTION INTRAVENOUS
Status: DISCONTINUED | OUTPATIENT
Start: 2021-03-10 | End: 2021-03-13 | Stop reason: HOSPADM

## 2021-03-10 RX ORDER — TRANEXAMIC ACID 100 MG/ML
1000 INJECTION, SOLUTION INTRAVENOUS ONCE AS NEEDED
Status: DISCONTINUED | OUTPATIENT
Start: 2021-03-10 | End: 2021-03-13 | Stop reason: HOSPADM

## 2021-03-10 RX ORDER — DOCUSATE SODIUM 100 MG/1
200 CAPSULE, LIQUID FILLED ORAL 2 TIMES DAILY
Status: DISCONTINUED | OUTPATIENT
Start: 2021-03-10 | End: 2021-03-13 | Stop reason: HOSPADM

## 2021-03-10 RX ORDER — OXYCODONE HYDROCHLORIDE 5 MG/1
10 TABLET ORAL EVERY 4 HOURS PRN
Status: DISCONTINUED | OUTPATIENT
Start: 2021-03-10 | End: 2021-03-13

## 2021-03-10 RX ORDER — ENOXAPARIN SODIUM 100 MG/ML
40 INJECTION SUBCUTANEOUS EVERY 24 HOURS
Status: DISCONTINUED | OUTPATIENT
Start: 2021-03-10 | End: 2021-03-13 | Stop reason: HOSPADM

## 2021-03-10 RX ORDER — SODIUM CITRATE AND CITRIC ACID MONOHYDRATE 334; 500 MG/5ML; MG/5ML
30 SOLUTION ORAL ONCE
Status: COMPLETED | OUTPATIENT
Start: 2021-03-10 | End: 2021-03-10

## 2021-03-10 RX ORDER — ACETAMINOPHEN 325 MG/1
650 TABLET ORAL
Status: DISCONTINUED | OUTPATIENT
Start: 2021-03-10 | End: 2021-03-13

## 2021-03-10 RX ORDER — NALBUPHINE HYDROCHLORIDE 10 MG/ML
5 INJECTION, SOLUTION INTRAMUSCULAR; INTRAVENOUS; SUBCUTANEOUS ONCE AS NEEDED
Status: DISCONTINUED | OUTPATIENT
Start: 2021-03-10 | End: 2021-03-13 | Stop reason: HOSPADM

## 2021-03-10 RX ORDER — DIPHENOXYLATE HYDROCHLORIDE AND ATROPINE SULFATE 2.5; .025 MG/1; MG/1
2 TABLET ORAL EVERY 6 HOURS PRN
Status: DISCONTINUED | OUTPATIENT
Start: 2021-03-10 | End: 2021-03-13 | Stop reason: HOSPADM

## 2021-03-10 RX ORDER — FAMOTIDINE 10 MG/ML
20 INJECTION INTRAVENOUS ONCE
Status: COMPLETED | OUTPATIENT
Start: 2021-03-10 | End: 2021-03-10

## 2021-03-10 RX ORDER — ENOXAPARIN SODIUM 100 MG/ML
40 INJECTION SUBCUTANEOUS EVERY 24 HOURS
Status: DISCONTINUED | OUTPATIENT
Start: 2021-03-10 | End: 2021-03-10

## 2021-03-10 RX ORDER — MORPHINE SULFATE 1 MG/ML
INJECTION, SOLUTION EPIDURAL; INTRATHECAL; INTRAVENOUS
Status: DISCONTINUED | OUTPATIENT
Start: 2021-03-10 | End: 2021-03-10

## 2021-03-10 RX ORDER — AMOXICILLIN 250 MG
1 CAPSULE ORAL NIGHTLY PRN
Status: DISCONTINUED | OUTPATIENT
Start: 2021-03-10 | End: 2021-03-13 | Stop reason: HOSPADM

## 2021-03-10 RX ORDER — HYDROCORTISONE 25 MG/G
CREAM TOPICAL 3 TIMES DAILY PRN
Status: DISCONTINUED | OUTPATIENT
Start: 2021-03-10 | End: 2021-03-13 | Stop reason: HOSPADM

## 2021-03-10 RX ORDER — KETOROLAC TROMETHAMINE 30 MG/ML
30 INJECTION, SOLUTION INTRAMUSCULAR; INTRAVENOUS
Status: COMPLETED | OUTPATIENT
Start: 2021-03-10 | End: 2021-03-11

## 2021-03-10 RX ORDER — DIPHENHYDRAMINE HYDROCHLORIDE 50 MG/ML
12.5 INJECTION INTRAMUSCULAR; INTRAVENOUS
Status: COMPLETED | OUTPATIENT
Start: 2021-03-10 | End: 2021-03-10

## 2021-03-10 RX ORDER — PHENYLEPHRINE HYDROCHLORIDE 10 MG/ML
INJECTION INTRAVENOUS
Status: DISCONTINUED | OUTPATIENT
Start: 2021-03-10 | End: 2021-03-10

## 2021-03-10 RX ORDER — MISOPROSTOL 200 UG/1
800 TABLET ORAL ONCE AS NEEDED
Status: DISCONTINUED | OUTPATIENT
Start: 2021-03-10 | End: 2021-03-13

## 2021-03-10 RX ORDER — PROCHLORPERAZINE EDISYLATE 5 MG/ML
5 INJECTION INTRAMUSCULAR; INTRAVENOUS EVERY 6 HOURS PRN
Status: DISCONTINUED | OUTPATIENT
Start: 2021-03-10 | End: 2021-03-13 | Stop reason: HOSPADM

## 2021-03-10 RX ORDER — ACETAMINOPHEN 500 MG
1000 TABLET ORAL
Status: DISCONTINUED | OUTPATIENT
Start: 2021-03-10 | End: 2021-03-13

## 2021-03-10 RX ADMIN — PHENYLEPHRINE HYDROCHLORIDE 100 MCG: 10 INJECTION INTRAVENOUS at 07:03

## 2021-03-10 RX ADMIN — DIPHENHYDRAMINE HYDROCHLORIDE 12.5 MG: 50 INJECTION INTRAMUSCULAR; INTRAVENOUS at 10:03

## 2021-03-10 RX ADMIN — CEFAZOLIN SODIUM 3 G: 2 SOLUTION INTRAVENOUS at 07:03

## 2021-03-10 RX ADMIN — MORPHINE SULFATE 0.1 MG: 1 INJECTION, SOLUTION EPIDURAL; INTRATHECAL; INTRAVENOUS at 07:03

## 2021-03-10 RX ADMIN — ONDANSETRON 4 MG: 2 INJECTION, SOLUTION INTRAMUSCULAR; INTRAVENOUS at 07:03

## 2021-03-10 RX ADMIN — PHENYLEPHRINE HYDROCHLORIDE 200 MCG: 10 INJECTION INTRAVENOUS at 08:03

## 2021-03-10 RX ADMIN — SODIUM CHLORIDE, SODIUM LACTATE, POTASSIUM CHLORIDE, AND CALCIUM CHLORIDE 1000 ML: .6; .31; .03; .02 INJECTION, SOLUTION INTRAVENOUS at 06:03

## 2021-03-10 RX ADMIN — PHENYLEPHRINE HYDROCHLORIDE 100 MCG: 10 INJECTION INTRAVENOUS at 08:03

## 2021-03-10 RX ADMIN — GLYCOPYRROLATE 0.2 MG: 0.2 INJECTION, SOLUTION INTRAMUSCULAR; INTRAVITREAL at 07:03

## 2021-03-10 RX ADMIN — KETOROLAC TROMETHAMINE 30 MG: 30 INJECTION, SOLUTION INTRAMUSCULAR; INTRAVENOUS at 09:03

## 2021-03-10 RX ADMIN — SODIUM CITRATE AND CITRIC ACID MONOHYDRATE 30 ML: 500; 334 SOLUTION ORAL at 07:03

## 2021-03-10 RX ADMIN — ACETAMINOPHEN 650 MG: 325 TABLET ORAL at 09:03

## 2021-03-10 RX ADMIN — PHENYLEPHRINE HYDROCHLORIDE 200 MCG: 10 INJECTION INTRAVENOUS at 07:03

## 2021-03-10 RX ADMIN — ACETAMINOPHEN 650 MG: 325 TABLET ORAL at 03:03

## 2021-03-10 RX ADMIN — Medication 95 ML/HR: at 08:03

## 2021-03-10 RX ADMIN — FENTANYL CITRATE 50 MCG: 50 INJECTION, SOLUTION INTRAMUSCULAR; INTRAVENOUS at 08:03

## 2021-03-10 RX ADMIN — FENTANYL CITRATE 40 MCG: 50 INJECTION, SOLUTION INTRAMUSCULAR; INTRAVENOUS at 08:03

## 2021-03-10 RX ADMIN — PRENATAL VIT W/ FE FUMARATE-FA TAB 27-0.8 MG 1 TABLET: 27-0.8 TAB at 09:03

## 2021-03-10 RX ADMIN — DIPHENHYDRAMINE HYDROCHLORIDE 12.5 MG: 50 INJECTION INTRAMUSCULAR; INTRAVENOUS at 08:03

## 2021-03-10 RX ADMIN — DOCUSATE SODIUM 200 MG: 100 CAPSULE ORAL at 09:03

## 2021-03-10 RX ADMIN — FAMOTIDINE 20 MG: 10 INJECTION INTRAVENOUS at 07:03

## 2021-03-10 RX ADMIN — FENTANYL CITRATE 10 MCG: 50 INJECTION, SOLUTION INTRAMUSCULAR; INTRAVENOUS at 07:03

## 2021-03-10 RX ADMIN — KETOROLAC TROMETHAMINE 30 MG: 30 INJECTION, SOLUTION INTRAMUSCULAR; INTRAVENOUS at 03:03

## 2021-03-10 RX ADMIN — ENOXAPARIN SODIUM 40 MG: 40 INJECTION SUBCUTANEOUS at 09:03

## 2021-03-10 RX ADMIN — Medication 334 ML/HR: at 08:03

## 2021-03-11 LAB
ANISOCYTOSIS BLD QL SMEAR: SLIGHT
BASOPHILS # BLD AUTO: 0.02 K/UL (ref 0–0.2)
BASOPHILS NFR BLD: 0.2 % (ref 0–1.9)
DACRYOCYTES BLD QL SMEAR: ABNORMAL
DIFFERENTIAL METHOD: ABNORMAL
EOSINOPHIL # BLD AUTO: 0.2 K/UL (ref 0–0.5)
EOSINOPHIL NFR BLD: 2 % (ref 0–8)
ERYTHROCYTE [DISTWIDTH] IN BLOOD BY AUTOMATED COUNT: ABNORMAL % (ref 11.5–14.5)
HCT VFR BLD AUTO: 26.8 % (ref 37–48.5)
HGB BLD-MCNC: 8.7 G/DL (ref 12–16)
IMM GRANULOCYTES # BLD AUTO: 0.07 K/UL (ref 0–0.04)
IMM GRANULOCYTES NFR BLD AUTO: 0.9 % (ref 0–0.5)
LYMPHOCYTES # BLD AUTO: 1.8 K/UL (ref 1–4.8)
LYMPHOCYTES NFR BLD: 22.6 % (ref 18–48)
MCH RBC QN AUTO: 26.8 PG (ref 27–31)
MCHC RBC AUTO-ENTMCNC: 32.5 G/DL (ref 32–36)
MCV RBC AUTO: 83 FL (ref 82–98)
MONOCYTES # BLD AUTO: 0.6 K/UL (ref 0.3–1)
MONOCYTES NFR BLD: 6.9 % (ref 4–15)
NEUTROPHILS # BLD AUTO: 5.4 K/UL (ref 1.8–7.7)
NEUTROPHILS NFR BLD: 67.4 % (ref 38–73)
NRBC BLD-RTO: 0 /100 WBC
PLATELET # BLD AUTO: 238 K/UL (ref 150–350)
PLATELET BLD QL SMEAR: ABNORMAL
PMV BLD AUTO: 12 FL (ref 9.2–12.9)
POIKILOCYTOSIS BLD QL SMEAR: SLIGHT
POLYCHROMASIA BLD QL SMEAR: ABNORMAL
RBC # BLD AUTO: 3.25 M/UL (ref 4–5.4)
RPR SER QL: NORMAL
SPHEROCYTES BLD QL SMEAR: ABNORMAL
WBC # BLD AUTO: 8.06 K/UL (ref 3.9–12.7)

## 2021-03-11 PROCEDURE — 99024 PR POST-OP FOLLOW-UP VISIT: ICD-10-PCS | Mod: ,,, | Performed by: OBSTETRICS & GYNECOLOGY

## 2021-03-11 PROCEDURE — 99024 POSTOP FOLLOW-UP VISIT: CPT | Mod: ,,, | Performed by: OBSTETRICS & GYNECOLOGY

## 2021-03-11 PROCEDURE — 99232 PR SUBSEQUENT HOSPITAL CARE,LEVL II: ICD-10-PCS | Mod: ,,, | Performed by: OBSTETRICS & GYNECOLOGY

## 2021-03-11 PROCEDURE — 85025 COMPLETE CBC W/AUTO DIFF WBC: CPT | Performed by: OBSTETRICS & GYNECOLOGY

## 2021-03-11 PROCEDURE — 25000003 PHARM REV CODE 250: Performed by: OBSTETRICS & GYNECOLOGY

## 2021-03-11 PROCEDURE — 63600175 PHARM REV CODE 636 W HCPCS: Performed by: OBSTETRICS & GYNECOLOGY

## 2021-03-11 PROCEDURE — 99232 SBSQ HOSP IP/OBS MODERATE 35: CPT | Mod: ,,, | Performed by: OBSTETRICS & GYNECOLOGY

## 2021-03-11 PROCEDURE — 36415 COLL VENOUS BLD VENIPUNCTURE: CPT | Performed by: OBSTETRICS & GYNECOLOGY

## 2021-03-11 PROCEDURE — 11000001 HC ACUTE MED/SURG PRIVATE ROOM

## 2021-03-11 RX ADMIN — ACETAMINOPHEN 650 MG: 325 TABLET ORAL at 08:03

## 2021-03-11 RX ADMIN — ENOXAPARIN SODIUM 40 MG: 40 INJECTION SUBCUTANEOUS at 05:03

## 2021-03-11 RX ADMIN — OXYCODONE 10 MG: 5 TABLET ORAL at 08:03

## 2021-03-11 RX ADMIN — IBUPROFEN 800 MG: 400 TABLET, FILM COATED ORAL at 08:03

## 2021-03-11 RX ADMIN — ACETAMINOPHEN 650 MG: 325 TABLET ORAL at 02:03

## 2021-03-11 RX ADMIN — OXYCODONE 10 MG: 5 TABLET ORAL at 05:03

## 2021-03-11 RX ADMIN — KETOROLAC TROMETHAMINE 30 MG: 30 INJECTION, SOLUTION INTRAMUSCULAR; INTRAVENOUS at 02:03

## 2021-03-11 RX ADMIN — DOCUSATE SODIUM 200 MG: 100 CAPSULE ORAL at 08:03

## 2021-03-11 RX ADMIN — IBUPROFEN 800 MG: 400 TABLET, FILM COATED ORAL at 05:03

## 2021-03-11 RX ADMIN — PRENATAL VIT W/ FE FUMARATE-FA TAB 27-0.8 MG 1 TABLET: 27-0.8 TAB at 08:03

## 2021-03-12 PROCEDURE — 11000001 HC ACUTE MED/SURG PRIVATE ROOM

## 2021-03-12 PROCEDURE — 25000003 PHARM REV CODE 250: Performed by: OBSTETRICS & GYNECOLOGY

## 2021-03-12 PROCEDURE — 63600175 PHARM REV CODE 636 W HCPCS: Performed by: OBSTETRICS & GYNECOLOGY

## 2021-03-12 RX ADMIN — OXYCODONE 10 MG: 5 TABLET ORAL at 05:03

## 2021-03-12 RX ADMIN — ACETAMINOPHEN 650 MG: 325 TABLET ORAL at 03:03

## 2021-03-12 RX ADMIN — DOCUSATE SODIUM 200 MG: 100 CAPSULE ORAL at 09:03

## 2021-03-12 RX ADMIN — DOCUSATE SODIUM 200 MG: 100 CAPSULE ORAL at 08:03

## 2021-03-12 RX ADMIN — DOCUSATE SODIUM 50 MG AND SENNOSIDES 8.6 MG 1 TABLET: 8.6; 5 TABLET, FILM COATED ORAL at 11:03

## 2021-03-12 RX ADMIN — ENOXAPARIN SODIUM 40 MG: 40 INJECTION SUBCUTANEOUS at 04:03

## 2021-03-12 RX ADMIN — ACETAMINOPHEN 650 MG: 325 TABLET ORAL at 09:03

## 2021-03-12 RX ADMIN — IBUPROFEN 800 MG: 400 TABLET, FILM COATED ORAL at 12:03

## 2021-03-12 RX ADMIN — PRENATAL VIT W/ FE FUMARATE-FA TAB 27-0.8 MG 1 TABLET: 27-0.8 TAB at 09:03

## 2021-03-12 RX ADMIN — IBUPROFEN 800 MG: 400 TABLET, FILM COATED ORAL at 09:03

## 2021-03-12 RX ADMIN — IBUPROFEN 800 MG: 400 TABLET, FILM COATED ORAL at 04:03

## 2021-03-12 RX ADMIN — OXYCODONE 10 MG: 5 TABLET ORAL at 11:03

## 2021-03-12 RX ADMIN — ACETAMINOPHEN 650 MG: 325 TABLET ORAL at 04:03

## 2021-03-12 RX ADMIN — OXYCODONE 10 MG: 5 TABLET ORAL at 08:03

## 2021-03-12 RX ADMIN — SIMETHICONE 80 MG: 80 TABLET, CHEWABLE ORAL at 11:03

## 2021-03-12 RX ADMIN — ACETAMINOPHEN 650 MG: 325 TABLET ORAL at 08:03

## 2021-03-12 RX ADMIN — OXYCODONE 10 MG: 5 TABLET ORAL at 12:03

## 2021-03-13 VITALS
DIASTOLIC BLOOD PRESSURE: 70 MMHG | HEIGHT: 62 IN | RESPIRATION RATE: 17 BRPM | SYSTOLIC BLOOD PRESSURE: 126 MMHG | WEIGHT: 267.88 LBS | TEMPERATURE: 98 F | HEART RATE: 87 BPM | BODY MASS INDEX: 49.3 KG/M2 | OXYGEN SATURATION: 100 %

## 2021-03-13 PROBLEM — O10.919 CHRONIC HYPERTENSION AFFECTING PREGNANCY: Status: ACTIVE | Noted: 2021-03-13

## 2021-03-13 PROCEDURE — 99024 PR POST-OP FOLLOW-UP VISIT: ICD-10-PCS | Mod: ,,, | Performed by: OBSTETRICS & GYNECOLOGY

## 2021-03-13 PROCEDURE — 99024 POSTOP FOLLOW-UP VISIT: CPT | Mod: ,,, | Performed by: OBSTETRICS & GYNECOLOGY

## 2021-03-13 PROCEDURE — 25000003 PHARM REV CODE 250: Performed by: OBSTETRICS & GYNECOLOGY

## 2021-03-13 RX ORDER — HYDROCODONE BITARTRATE AND ACETAMINOPHEN 5; 325 MG/1; MG/1
1 TABLET ORAL EVERY 6 HOURS PRN
Status: DISCONTINUED | OUTPATIENT
Start: 2021-03-13 | End: 2021-03-13 | Stop reason: HOSPADM

## 2021-03-13 RX ORDER — HYDROCODONE BITARTRATE AND ACETAMINOPHEN 5; 325 MG/1; MG/1
1 TABLET ORAL EVERY 6 HOURS PRN
Qty: 30 TABLET | Refills: 0 | Status: SHIPPED | OUTPATIENT
Start: 2021-03-13 | End: 2022-06-06

## 2021-03-13 RX ORDER — LABETALOL 100 MG/1
200 TABLET, FILM COATED ORAL EVERY 12 HOURS
Status: DISCONTINUED | OUTPATIENT
Start: 2021-03-13 | End: 2021-03-13 | Stop reason: HOSPADM

## 2021-03-13 RX ORDER — IBUPROFEN 800 MG/1
800 TABLET ORAL EVERY 8 HOURS
Qty: 40 TABLET | Refills: 0 | Status: SHIPPED | OUTPATIENT
Start: 2021-03-13 | End: 2022-06-16

## 2021-03-13 RX ORDER — IBUPROFEN 600 MG/1
600 TABLET ORAL EVERY 6 HOURS
Status: DISCONTINUED | OUTPATIENT
Start: 2021-03-13 | End: 2021-03-13 | Stop reason: HOSPADM

## 2021-03-13 RX ORDER — LABETALOL 200 MG/1
200 TABLET, FILM COATED ORAL EVERY 12 HOURS
Qty: 60 TABLET | Refills: 11 | OUTPATIENT
Start: 2021-03-13 | End: 2021-04-24

## 2021-03-13 RX ADMIN — LABETALOL HYDROCHLORIDE 200 MG: 100 TABLET, FILM COATED ORAL at 11:03

## 2021-03-13 RX ADMIN — ACETAMINOPHEN 650 MG: 325 TABLET ORAL at 03:03

## 2021-03-13 RX ADMIN — IBUPROFEN 800 MG: 400 TABLET, FILM COATED ORAL at 01:03

## 2021-03-13 RX ADMIN — OXYCODONE 10 MG: 5 TABLET ORAL at 09:03

## 2021-03-13 RX ADMIN — HYDROCODONE BITARTRATE AND ACETAMINOPHEN 1 TABLET: 5; 325 TABLET ORAL at 02:03

## 2021-03-13 RX ADMIN — IBUPROFEN 800 MG: 400 TABLET, FILM COATED ORAL at 09:03

## 2021-03-13 RX ADMIN — OXYCODONE 10 MG: 5 TABLET ORAL at 01:03

## 2021-03-13 RX ADMIN — PRENATAL VIT W/ FE FUMARATE-FA TAB 27-0.8 MG 1 TABLET: 27-0.8 TAB at 09:03

## 2021-03-13 RX ADMIN — DOCUSATE SODIUM 200 MG: 100 CAPSULE ORAL at 09:03

## 2021-03-15 ENCOUNTER — TELEPHONE (OUTPATIENT)
Dept: OBSTETRICS AND GYNECOLOGY | Facility: HOSPITAL | Age: 36
End: 2021-03-15

## 2021-03-16 ENCOUNTER — TELEPHONE (OUTPATIENT)
Dept: OBSTETRICS AND GYNECOLOGY | Facility: HOSPITAL | Age: 36
End: 2021-03-16

## 2021-03-24 ENCOUNTER — POSTPARTUM VISIT (OUTPATIENT)
Dept: OBSTETRICS AND GYNECOLOGY | Facility: CLINIC | Age: 36
End: 2021-03-24
Payer: COMMERCIAL

## 2021-03-24 VITALS — SYSTOLIC BLOOD PRESSURE: 130 MMHG | DIASTOLIC BLOOD PRESSURE: 72 MMHG | BODY MASS INDEX: 46.5 KG/M2 | WEIGHT: 254.25 LBS

## 2021-03-24 DIAGNOSIS — Z09 POSTOP CHECK: Primary | ICD-10-CM

## 2021-03-24 PROCEDURE — 99212 OFFICE O/P EST SF 10 MIN: CPT | Mod: 24,S$GLB,, | Performed by: OBSTETRICS & GYNECOLOGY

## 2021-03-24 PROCEDURE — 99212 PR OFFICE/OUTPT VISIT, EST, LEVL II, 10-19 MIN: ICD-10-PCS | Mod: 24,S$GLB,, | Performed by: OBSTETRICS & GYNECOLOGY

## 2021-03-24 PROCEDURE — 99999 PR PBB SHADOW E&M-EST. PATIENT-LVL II: CPT | Mod: PBBFAC,,, | Performed by: OBSTETRICS & GYNECOLOGY

## 2021-03-24 PROCEDURE — 99999 PR PBB SHADOW E&M-EST. PATIENT-LVL II: ICD-10-PCS | Mod: PBBFAC,,, | Performed by: OBSTETRICS & GYNECOLOGY

## 2021-03-24 RX ORDER — BUPROPION HYDROCHLORIDE 150 MG/1
150 TABLET ORAL EVERY MORNING
Qty: 30 TABLET | Refills: 5 | Status: SHIPPED | OUTPATIENT
Start: 2021-03-24 | End: 2022-06-06

## 2021-04-07 ENCOUNTER — POSTPARTUM VISIT (OUTPATIENT)
Dept: OBSTETRICS AND GYNECOLOGY | Facility: CLINIC | Age: 36
End: 2021-04-07
Payer: COMMERCIAL

## 2021-04-07 VITALS
SYSTOLIC BLOOD PRESSURE: 172 MMHG | DIASTOLIC BLOOD PRESSURE: 100 MMHG | WEIGHT: 245.94 LBS | BODY MASS INDEX: 44.98 KG/M2

## 2021-04-07 DIAGNOSIS — O10.919 CHRONIC HYPERTENSION AFFECTING PREGNANCY: Primary | ICD-10-CM

## 2021-04-07 DIAGNOSIS — Z91.148 NONCOMPLIANCE W/MEDICATION TREATMENT DUE TO INTERMIT USE OF MEDICATION: ICD-10-CM

## 2021-04-07 PROCEDURE — 99999 PR PBB SHADOW E&M-EST. PATIENT-LVL II: CPT | Mod: PBBFAC,,, | Performed by: OBSTETRICS & GYNECOLOGY

## 2021-04-07 PROCEDURE — 99212 OFFICE O/P EST SF 10 MIN: CPT | Mod: 24,S$GLB,, | Performed by: OBSTETRICS & GYNECOLOGY

## 2021-04-07 PROCEDURE — 99999 PR PBB SHADOW E&M-EST. PATIENT-LVL II: ICD-10-PCS | Mod: PBBFAC,,, | Performed by: OBSTETRICS & GYNECOLOGY

## 2021-04-07 PROCEDURE — 99212 PR OFFICE/OUTPT VISIT, EST, LEVL II, 10-19 MIN: ICD-10-PCS | Mod: 24,S$GLB,, | Performed by: OBSTETRICS & GYNECOLOGY

## 2021-04-12 ENCOUNTER — TELEPHONE (OUTPATIENT)
Dept: OBSTETRICS AND GYNECOLOGY | Facility: CLINIC | Age: 36
End: 2021-04-12

## 2021-04-20 ENCOUNTER — POSTPARTUM VISIT (OUTPATIENT)
Dept: OBSTETRICS AND GYNECOLOGY | Facility: CLINIC | Age: 36
End: 2021-04-20
Payer: COMMERCIAL

## 2021-04-20 VITALS
SYSTOLIC BLOOD PRESSURE: 152 MMHG | DIASTOLIC BLOOD PRESSURE: 98 MMHG | BODY MASS INDEX: 44.92 KG/M2 | WEIGHT: 245.56 LBS

## 2021-04-20 DIAGNOSIS — Z30.014 ENCOUNTER FOR INITIAL PRESCRIPTION OF INTRAUTERINE CONTRACEPTIVE DEVICE (IUD): ICD-10-CM

## 2021-04-20 PROBLEM — O10.919 CHRONIC HYPERTENSION AFFECTING PREGNANCY: Status: RESOLVED | Noted: 2021-03-13 | Resolved: 2021-04-20

## 2021-04-20 PROCEDURE — 99999 PR PBB SHADOW E&M-EST. PATIENT-LVL II: ICD-10-PCS | Mod: PBBFAC,,, | Performed by: OBSTETRICS & GYNECOLOGY

## 2021-04-20 PROCEDURE — 99999 PR PBB SHADOW E&M-EST. PATIENT-LVL II: CPT | Mod: PBBFAC,,, | Performed by: OBSTETRICS & GYNECOLOGY

## 2021-04-22 ENCOUNTER — PATIENT MESSAGE (OUTPATIENT)
Dept: UROLOGY | Facility: CLINIC | Age: 36
End: 2021-04-22

## 2021-04-24 ENCOUNTER — HOSPITAL ENCOUNTER (EMERGENCY)
Facility: OTHER | Age: 36
Discharge: HOME | End: 2021-04-24
Attending: EMERGENCY MEDICINE
Payer: COMMERCIAL

## 2021-04-24 VITALS
RESPIRATION RATE: 18 BRPM | HEART RATE: 69 BPM | SYSTOLIC BLOOD PRESSURE: 167 MMHG | DIASTOLIC BLOOD PRESSURE: 93 MMHG | TEMPERATURE: 98 F | HEIGHT: 64 IN | OXYGEN SATURATION: 96 % | BODY MASS INDEX: 42.16 KG/M2

## 2021-04-24 DIAGNOSIS — I10 HYPERTENSION: Primary | ICD-10-CM

## 2021-04-24 DIAGNOSIS — G43.809 OTHER MIGRAINE WITHOUT STATUS MIGRAINOSUS, NOT INTRACTABLE: ICD-10-CM

## 2021-04-24 LAB
ALBUMIN SERPL BCP-MCNC: 3.5 G/DL (ref 3.5–5.2)
ALP SERPL-CCNC: 77 U/L (ref 55–135)
ALT SERPL W/O P-5'-P-CCNC: 23 U/L (ref 10–44)
ANION GAP SERPL CALC-SCNC: 11 MMOL/L (ref 8–16)
AST SERPL-CCNC: 19 U/L (ref 10–40)
BASOPHILS # BLD AUTO: 0.02 K/UL (ref 0–0.2)
BASOPHILS NFR BLD: 0.4 % (ref 0–1.9)
BILIRUB SERPL-MCNC: 0.3 MG/DL (ref 0.1–1)
BILIRUB UR QL STRIP: NEGATIVE
BNP SERPL-MCNC: 61 PG/ML (ref 0–99)
BUN SERPL-MCNC: 6 MG/DL (ref 6–20)
CALCIUM SERPL-MCNC: 9.2 MG/DL (ref 8.7–10.5)
CHLORIDE SERPL-SCNC: 107 MMOL/L (ref 95–110)
CLARITY UR: CLEAR
CO2 SERPL-SCNC: 24 MMOL/L (ref 23–29)
COLOR UR: YELLOW
CREAT SERPL-MCNC: 0.9 MG/DL (ref 0.5–1.4)
DIFFERENTIAL METHOD: ABNORMAL
EOSINOPHIL # BLD AUTO: 0.1 K/UL (ref 0–0.5)
EOSINOPHIL NFR BLD: 1.2 % (ref 0–8)
ERYTHROCYTE [DISTWIDTH] IN BLOOD BY AUTOMATED COUNT: 16.5 % (ref 11.5–14.5)
EST. GFR  (AFRICAN AMERICAN): >60 ML/MIN/1.73 M^2
EST. GFR  (NON AFRICAN AMERICAN): >60 ML/MIN/1.73 M^2
GLUCOSE SERPL-MCNC: 119 MG/DL (ref 70–110)
GLUCOSE UR QL STRIP: NEGATIVE
HCT VFR BLD AUTO: 35.8 % (ref 37–48.5)
HGB BLD-MCNC: 11.6 G/DL (ref 12–16)
HGB UR QL STRIP: NEGATIVE
IMM GRANULOCYTES # BLD AUTO: 0.01 K/UL (ref 0–0.04)
IMM GRANULOCYTES NFR BLD AUTO: 0.2 % (ref 0–0.5)
KETONES UR QL STRIP: NEGATIVE
LEUKOCYTE ESTERASE UR QL STRIP: NEGATIVE
LYMPHOCYTES # BLD AUTO: 1.6 K/UL (ref 1–4.8)
LYMPHOCYTES NFR BLD: 32.6 % (ref 18–48)
MAGNESIUM SERPL-MCNC: 2 MG/DL (ref 1.6–2.6)
MCH RBC QN AUTO: 26.8 PG (ref 27–31)
MCHC RBC AUTO-ENTMCNC: 32.4 G/DL (ref 32–36)
MCV RBC AUTO: 83 FL (ref 82–98)
MONOCYTES # BLD AUTO: 0.4 K/UL (ref 0.3–1)
MONOCYTES NFR BLD: 8.5 % (ref 4–15)
NEUTROPHILS # BLD AUTO: 2.8 K/UL (ref 1.8–7.7)
NEUTROPHILS NFR BLD: 57.1 % (ref 38–73)
NITRITE UR QL STRIP: NEGATIVE
NRBC BLD-RTO: 0 /100 WBC
PH UR STRIP: 6 [PH] (ref 5–8)
PLATELET # BLD AUTO: 237 K/UL (ref 150–450)
PMV BLD AUTO: 10.4 FL (ref 9.2–12.9)
POTASSIUM SERPL-SCNC: 3.7 MMOL/L (ref 3.5–5.1)
PROT SERPL-MCNC: 7.4 G/DL (ref 6–8.4)
PROT UR QL STRIP: NEGATIVE
RBC # BLD AUTO: 4.33 M/UL (ref 4–5.4)
SODIUM SERPL-SCNC: 142 MMOL/L (ref 136–145)
SP GR UR STRIP: 1.01 (ref 1–1.03)
URN SPEC COLLECT METH UR: NORMAL
UROBILINOGEN UR STRIP-ACNC: NEGATIVE EU/DL
WBC # BLD AUTO: 4.82 K/UL (ref 3.9–12.7)

## 2021-04-24 PROCEDURE — 83735 ASSAY OF MAGNESIUM: CPT | Performed by: EMERGENCY MEDICINE

## 2021-04-24 PROCEDURE — 93010 EKG 12-LEAD: ICD-10-PCS | Mod: ,,, | Performed by: INTERNAL MEDICINE

## 2021-04-24 PROCEDURE — 25000003 PHARM REV CODE 250: Performed by: EMERGENCY MEDICINE

## 2021-04-24 PROCEDURE — 85025 COMPLETE CBC W/AUTO DIFF WBC: CPT | Performed by: EMERGENCY MEDICINE

## 2021-04-24 PROCEDURE — 81003 URINALYSIS AUTO W/O SCOPE: CPT | Performed by: EMERGENCY MEDICINE

## 2021-04-24 PROCEDURE — 93010 ELECTROCARDIOGRAM REPORT: CPT | Mod: ,,, | Performed by: INTERNAL MEDICINE

## 2021-04-24 PROCEDURE — 80053 COMPREHEN METABOLIC PANEL: CPT | Performed by: EMERGENCY MEDICINE

## 2021-04-24 PROCEDURE — 93005 ELECTROCARDIOGRAM TRACING: CPT

## 2021-04-24 PROCEDURE — 83880 ASSAY OF NATRIURETIC PEPTIDE: CPT | Performed by: EMERGENCY MEDICINE

## 2021-04-24 PROCEDURE — 99285 EMERGENCY DEPT VISIT HI MDM: CPT | Mod: 25

## 2021-04-24 RX ORDER — BUTALBITAL, ACETAMINOPHEN AND CAFFEINE 50; 325; 40 MG/1; MG/1; MG/1
1 TABLET ORAL
Status: COMPLETED | OUTPATIENT
Start: 2021-04-24 | End: 2021-04-24

## 2021-04-24 RX ORDER — BUTALBITAL, ACETAMINOPHEN AND CAFFEINE 50; 325; 40 MG/1; MG/1; MG/1
1 TABLET ORAL EVERY 4 HOURS PRN
Qty: 13 TABLET | Refills: 0 | Status: SHIPPED | OUTPATIENT
Start: 2021-04-24 | End: 2021-05-24

## 2021-04-24 RX ORDER — NIFEDIPINE 30 MG/1
30 TABLET, EXTENDED RELEASE ORAL DAILY
Qty: 30 TABLET | Refills: 0 | Status: SHIPPED | OUTPATIENT
Start: 2021-04-24 | End: 2022-06-06 | Stop reason: SDUPTHER

## 2021-04-24 RX ADMIN — BUTALBITAL, ACETAMINOPHEN, AND CAFFEINE 1 TABLET: 50; 325; 40 TABLET ORAL at 12:04

## 2021-04-26 ENCOUNTER — PATIENT MESSAGE (OUTPATIENT)
Dept: RESEARCH | Facility: HOSPITAL | Age: 36
End: 2021-04-26

## 2021-04-27 ENCOUNTER — OFFICE VISIT (OUTPATIENT)
Dept: CARDIOLOGY | Facility: CLINIC | Age: 36
End: 2021-04-27
Payer: COMMERCIAL

## 2021-04-27 VITALS
SYSTOLIC BLOOD PRESSURE: 154 MMHG | BODY MASS INDEX: 41.93 KG/M2 | WEIGHT: 244.25 LBS | OXYGEN SATURATION: 99 % | DIASTOLIC BLOOD PRESSURE: 106 MMHG | HEART RATE: 70 BPM

## 2021-04-27 DIAGNOSIS — I10 ESSENTIAL HYPERTENSION: ICD-10-CM

## 2021-04-27 DIAGNOSIS — E66.01 SEVERE OBESITY: ICD-10-CM

## 2021-04-27 DIAGNOSIS — I11.9 HYPERTENSIVE HEART DISEASE WITHOUT HEART FAILURE: Primary | ICD-10-CM

## 2021-04-27 PROCEDURE — 99204 OFFICE O/P NEW MOD 45 MIN: CPT | Mod: S$GLB,,, | Performed by: INTERNAL MEDICINE

## 2021-04-27 PROCEDURE — 99999 PR PBB SHADOW E&M-EST. PATIENT-LVL III: CPT | Mod: PBBFAC,,, | Performed by: INTERNAL MEDICINE

## 2021-04-27 PROCEDURE — 99204 PR OFFICE/OUTPT VISIT, NEW, LEVL IV, 45-59 MIN: ICD-10-PCS | Mod: S$GLB,,, | Performed by: INTERNAL MEDICINE

## 2021-04-27 PROCEDURE — 99999 PR PBB SHADOW E&M-EST. PATIENT-LVL III: ICD-10-PCS | Mod: PBBFAC,,, | Performed by: INTERNAL MEDICINE

## 2021-04-27 RX ORDER — HYDROCHLOROTHIAZIDE 25 MG/1
25 TABLET ORAL DAILY
Qty: 90 TABLET | Refills: 3 | Status: SHIPPED | OUTPATIENT
Start: 2021-04-27 | End: 2022-06-06 | Stop reason: SDUPTHER

## 2021-04-28 ENCOUNTER — HOSPITAL ENCOUNTER (OUTPATIENT)
Dept: CARDIOLOGY | Facility: OTHER | Age: 36
Discharge: HOME | End: 2021-04-28
Attending: INTERNAL MEDICINE
Payer: COMMERCIAL

## 2021-04-28 VITALS
HEART RATE: 70 BPM | HEIGHT: 64 IN | DIASTOLIC BLOOD PRESSURE: 106 MMHG | BODY MASS INDEX: 41.66 KG/M2 | SYSTOLIC BLOOD PRESSURE: 154 MMHG | WEIGHT: 244 LBS

## 2021-04-28 DIAGNOSIS — I11.9 HYPERTENSIVE HEART DISEASE WITHOUT HEART FAILURE: ICD-10-CM

## 2021-04-28 LAB
ASCENDING AORTA: 2.96 CM
AV INDEX (PROSTH): 0.98
AV MEAN GRADIENT: 5 MMHG
AV PEAK GRADIENT: 9 MMHG
AV VALVE AREA: 3.3 CM2
AV VELOCITY RATIO: 0.84
BSA FOR ECHO PROCEDURE: 2.24 M2
CV ECHO LV RWT: 0.66 CM
DOP CALC AO PEAK VEL: 1.52 M/S
DOP CALC AO VTI: 25.34 CM
DOP CALC LVOT AREA: 3.4 CM2
DOP CALC LVOT DIAMETER: 2.07 CM
DOP CALC LVOT PEAK VEL: 1.28 M/S
DOP CALC LVOT STROKE VOLUME: 83.52 CM3
DOP CALCLVOT PEAK VEL VTI: 24.83 CM
E WAVE DECELERATION TIME: 184.56 MSEC
E/A RATIO: 0.87
E/E' RATIO: 8.86 M/S
ECHO LV POSTERIOR WALL: 1.29 CM (ref 0.6–1.1)
EJECTION FRACTION: 65 %
FRACTIONAL SHORTENING: 47 % (ref 28–44)
INTERVENTRICULAR SEPTUM: 1.31 CM (ref 0.6–1.1)
IVRT: 100.35 MSEC
LA MAJOR: 5 CM
LA MINOR: 5 CM
LA WIDTH: 2.98 CM
LEFT ATRIUM SIZE: 3.67 CM
LEFT ATRIUM VOLUME INDEX MOD: 16.4 ML/M2
LEFT ATRIUM VOLUME INDEX: 21.8 ML/M2
LEFT ATRIUM VOLUME MOD: 35 CM3
LEFT ATRIUM VOLUME: 46.48 CM3
LEFT INTERNAL DIMENSION IN SYSTOLE: 2.08 CM (ref 2.1–4)
LEFT VENTRICLE DIASTOLIC VOLUME INDEX: 31.14 ML/M2
LEFT VENTRICLE DIASTOLIC VOLUME: 66.33 ML
LEFT VENTRICLE MASS INDEX: 85 G/M2
LEFT VENTRICLE SYSTOLIC VOLUME INDEX: 6.6 ML/M2
LEFT VENTRICLE SYSTOLIC VOLUME: 14.02 ML
LEFT VENTRICULAR INTERNAL DIMENSION IN DIASTOLE: 3.91 CM (ref 3.5–6)
LEFT VENTRICULAR MASS: 180.41 G
LV LATERAL E/E' RATIO: 7.75 M/S
LV SEPTAL E/E' RATIO: 10.33 M/S
MV A" WAVE DURATION": 6.92 MSEC
MV PEAK A VEL: 0.71 M/S
MV PEAK E VEL: 0.62 M/S
MV STENOSIS PRESSURE HALF TIME: 53.52 MS
MV VALVE AREA P 1/2 METHOD: 4.11 CM2
PISA TR MAX VEL: 1.68 M/S
PULM VEIN S/D RATIO: 1.5
PV PEAK D VEL: 0.36 M/S
PV PEAK S VEL: 0.54 M/S
PV PEAK VELOCITY: 1.09 CM/S
RA MAJOR: 4.7 CM
RA PRESSURE: 3 MMHG
RIGHT VENTRICULAR END-DIASTOLIC DIMENSION: 2.79 CM
RV TISSUE DOPPLER FREE WALL SYSTOLIC VELOCITY 1 (APICAL 4 CHAMBER VIEW): 15.2 CM/S
SINUS: 3.3 CM
STJ: 3.08 CM
TDI LATERAL: 0.08 M/S
TDI SEPTAL: 0.06 M/S
TDI: 0.07 M/S
TR MAX PG: 11 MMHG
TRICUSPID ANNULAR PLANE SYSTOLIC EXCURSION: 2.93 CM
TV REST PULMONARY ARTERY PRESSURE: 14 MMHG

## 2021-04-28 PROCEDURE — 93306 TTE W/DOPPLER COMPLETE: CPT | Mod: 26,,, | Performed by: INTERNAL MEDICINE

## 2021-04-28 PROCEDURE — 93306 TTE W/DOPPLER COMPLETE: CPT

## 2021-04-28 PROCEDURE — 93306 ECHO (CUPID ONLY): ICD-10-PCS | Mod: 26,,, | Performed by: INTERNAL MEDICINE

## 2021-05-18 ENCOUNTER — OFFICE VISIT (OUTPATIENT)
Dept: OBSTETRICS AND GYNECOLOGY | Facility: CLINIC | Age: 36
End: 2021-05-18
Payer: COMMERCIAL

## 2021-05-18 VITALS
WEIGHT: 245.56 LBS | SYSTOLIC BLOOD PRESSURE: 148 MMHG | DIASTOLIC BLOOD PRESSURE: 90 MMHG | BODY MASS INDEX: 42.16 KG/M2

## 2021-05-18 DIAGNOSIS — Z30.430 ENCOUNTER FOR INSERTION OF INTRAUTERINE CONTRACEPTIVE DEVICE (IUD): ICD-10-CM

## 2021-05-18 DIAGNOSIS — Z97.5 INTRAUTERINE DEVICE: ICD-10-CM

## 2021-05-18 DIAGNOSIS — Z32.02 NEGATIVE PREGNANCY TEST: Primary | ICD-10-CM

## 2021-05-18 LAB
B-HCG UR QL: NEGATIVE
CTP QC/QA: YES

## 2021-05-18 PROCEDURE — 81025 POCT URINE PREGNANCY: ICD-10-PCS | Mod: S$GLB,,, | Performed by: OBSTETRICS & GYNECOLOGY

## 2021-05-18 PROCEDURE — 99499 UNLISTED E&M SERVICE: CPT | Mod: S$GLB,,, | Performed by: OBSTETRICS & GYNECOLOGY

## 2021-05-18 PROCEDURE — 58300 INSERT INTRAUTERINE DEVICE: CPT | Mod: S$GLB,,, | Performed by: OBSTETRICS & GYNECOLOGY

## 2021-05-18 PROCEDURE — 58300 PR INSERT INTRAUTERINE DEVICE: ICD-10-PCS | Mod: S$GLB,,, | Performed by: OBSTETRICS & GYNECOLOGY

## 2021-05-18 PROCEDURE — 99499 NO LOS: ICD-10-PCS | Mod: S$GLB,,, | Performed by: OBSTETRICS & GYNECOLOGY

## 2021-05-18 PROCEDURE — 99999 PR PBB SHADOW E&M-EST. PATIENT-LVL II: CPT | Mod: PBBFAC,,, | Performed by: OBSTETRICS & GYNECOLOGY

## 2021-05-18 PROCEDURE — 81025 URINE PREGNANCY TEST: CPT | Mod: S$GLB,,, | Performed by: OBSTETRICS & GYNECOLOGY

## 2021-05-18 PROCEDURE — 99999 PR PBB SHADOW E&M-EST. PATIENT-LVL II: ICD-10-PCS | Mod: PBBFAC,,, | Performed by: OBSTETRICS & GYNECOLOGY

## 2021-06-09 ENCOUNTER — OFFICE VISIT (OUTPATIENT)
Dept: OBSTETRICS AND GYNECOLOGY | Facility: CLINIC | Age: 36
End: 2021-06-09
Payer: COMMERCIAL

## 2021-06-09 VITALS — SYSTOLIC BLOOD PRESSURE: 156 MMHG | WEIGHT: 242.5 LBS | BODY MASS INDEX: 41.63 KG/M2 | DIASTOLIC BLOOD PRESSURE: 98 MMHG

## 2021-06-09 DIAGNOSIS — Z30.431 ENCOUNTER FOR ROUTINE CHECKING OF INTRAUTERINE CONTRACEPTIVE DEVICE (IUD): Primary | ICD-10-CM

## 2021-06-09 PROCEDURE — 99212 OFFICE O/P EST SF 10 MIN: CPT | Mod: S$GLB,,, | Performed by: OBSTETRICS & GYNECOLOGY

## 2021-06-09 PROCEDURE — 99999 PR PBB SHADOW E&M-EST. PATIENT-LVL II: ICD-10-PCS | Mod: PBBFAC,,, | Performed by: OBSTETRICS & GYNECOLOGY

## 2021-06-09 PROCEDURE — 99212 PR OFFICE/OUTPT VISIT, EST, LEVL II, 10-19 MIN: ICD-10-PCS | Mod: S$GLB,,, | Performed by: OBSTETRICS & GYNECOLOGY

## 2021-06-09 PROCEDURE — 99999 PR PBB SHADOW E&M-EST. PATIENT-LVL II: CPT | Mod: PBBFAC,,, | Performed by: OBSTETRICS & GYNECOLOGY

## 2021-07-06 ENCOUNTER — OFFICE VISIT (OUTPATIENT)
Dept: CARDIOLOGY | Facility: CLINIC | Age: 36
End: 2021-07-06
Payer: COMMERCIAL

## 2021-07-06 VITALS
HEART RATE: 68 BPM | BODY MASS INDEX: 40.94 KG/M2 | SYSTOLIC BLOOD PRESSURE: 162 MMHG | WEIGHT: 238.56 LBS | DIASTOLIC BLOOD PRESSURE: 98 MMHG | OXYGEN SATURATION: 98 %

## 2021-07-06 DIAGNOSIS — I10 ESSENTIAL HYPERTENSION: Primary | ICD-10-CM

## 2021-07-06 DIAGNOSIS — E66.01 SEVERE OBESITY: ICD-10-CM

## 2021-07-06 PROCEDURE — 99213 OFFICE O/P EST LOW 20 MIN: CPT | Mod: S$GLB,,, | Performed by: INTERNAL MEDICINE

## 2021-07-06 PROCEDURE — 99999 PR PBB SHADOW E&M-EST. PATIENT-LVL III: CPT | Mod: PBBFAC,,, | Performed by: INTERNAL MEDICINE

## 2021-07-06 PROCEDURE — 99999 PR PBB SHADOW E&M-EST. PATIENT-LVL III: ICD-10-PCS | Mod: PBBFAC,,, | Performed by: INTERNAL MEDICINE

## 2021-07-06 PROCEDURE — 99213 PR OFFICE/OUTPT VISIT, EST, LEVL III, 20-29 MIN: ICD-10-PCS | Mod: S$GLB,,, | Performed by: INTERNAL MEDICINE

## 2021-09-15 NOTE — ED TRIAGE NOTES
+ left sided elbow abscess w/ new onset Friday, denies drainage from site. No fever or chills reported.    Metronidazole Counseling:  I discussed with the patient the risks of metronidazole including but not limited to seizures, nausea/vomiting, a metallic taste in the mouth, nausea/vomiting and severe allergy.

## 2022-02-22 ENCOUNTER — HOSPITAL ENCOUNTER (EMERGENCY)
Facility: OTHER | Age: 37
Discharge: HOME | End: 2022-02-22
Attending: EMERGENCY MEDICINE
Payer: MEDICAID

## 2022-02-22 VITALS
TEMPERATURE: 99 F | BODY MASS INDEX: 40 KG/M2 | WEIGHT: 217.38 LBS | HEART RATE: 64 BPM | HEIGHT: 62 IN | DIASTOLIC BLOOD PRESSURE: 87 MMHG | RESPIRATION RATE: 16 BRPM | OXYGEN SATURATION: 100 % | SYSTOLIC BLOOD PRESSURE: 153 MMHG

## 2022-02-22 DIAGNOSIS — S39.011A STRAIN OF ABDOMINAL WALL, INITIAL ENCOUNTER: Primary | ICD-10-CM

## 2022-02-22 DIAGNOSIS — T83.32XA DISPLACEMENT OF INTRAUTERINE CONTRACEPTIVE DEVICE, INITIAL ENCOUNTER: ICD-10-CM

## 2022-02-22 LAB
ALBUMIN SERPL BCP-MCNC: 3.8 G/DL (ref 3.5–5.2)
ALP SERPL-CCNC: 79 U/L (ref 55–135)
ALT SERPL W/O P-5'-P-CCNC: 11 U/L (ref 10–44)
ANION GAP SERPL CALC-SCNC: 8 MMOL/L (ref 8–16)
AST SERPL-CCNC: 14 U/L (ref 10–40)
B-HCG UR QL: NEGATIVE
BASOPHILS # BLD AUTO: 0.03 K/UL (ref 0–0.2)
BASOPHILS NFR BLD: 0.5 % (ref 0–1.9)
BILIRUB SERPL-MCNC: 0.3 MG/DL (ref 0.1–1)
BILIRUB UR QL STRIP: NEGATIVE
BUN SERPL-MCNC: 7 MG/DL (ref 6–20)
CALCIUM SERPL-MCNC: 9.7 MG/DL (ref 8.7–10.5)
CHLORIDE SERPL-SCNC: 107 MMOL/L (ref 95–110)
CLARITY UR: CLEAR
CO2 SERPL-SCNC: 27 MMOL/L (ref 23–29)
COLOR UR: YELLOW
CREAT SERPL-MCNC: 0.8 MG/DL (ref 0.5–1.4)
CREAT SERPL-MCNC: 0.9 MG/DL (ref 0.5–1.4)
CTP QC/QA: YES
DIFFERENTIAL METHOD: NORMAL
EOSINOPHIL # BLD AUTO: 0.1 K/UL (ref 0–0.5)
EOSINOPHIL NFR BLD: 1.1 % (ref 0–8)
ERYTHROCYTE [DISTWIDTH] IN BLOOD BY AUTOMATED COUNT: 14.2 % (ref 11.5–14.5)
EST. GFR  (AFRICAN AMERICAN): >60 ML/MIN/1.73 M^2
EST. GFR  (NON AFRICAN AMERICAN): >60 ML/MIN/1.73 M^2
GLUCOSE SERPL-MCNC: 92 MG/DL (ref 70–110)
GLUCOSE UR QL STRIP: NEGATIVE
HCT VFR BLD AUTO: 39.3 % (ref 37–48.5)
HCV AB SERPL QL IA: NEGATIVE
HGB BLD-MCNC: 13.1 G/DL (ref 12–16)
HGB UR QL STRIP: NEGATIVE
HIV 1+2 AB+HIV1 P24 AG SERPL QL IA: NEGATIVE
IMM GRANULOCYTES # BLD AUTO: 0.01 K/UL (ref 0–0.04)
IMM GRANULOCYTES NFR BLD AUTO: 0.2 % (ref 0–0.5)
KETONES UR QL STRIP: NEGATIVE
LEUKOCYTE ESTERASE UR QL STRIP: NEGATIVE
LIPASE SERPL-CCNC: 28 U/L (ref 4–60)
LYMPHOCYTES # BLD AUTO: 1.8 K/UL (ref 1–4.8)
LYMPHOCYTES NFR BLD: 27.3 % (ref 18–48)
MCH RBC QN AUTO: 28.6 PG (ref 27–31)
MCHC RBC AUTO-ENTMCNC: 33.3 G/DL (ref 32–36)
MCV RBC AUTO: 86 FL (ref 82–98)
MONOCYTES # BLD AUTO: 0.4 K/UL (ref 0.3–1)
MONOCYTES NFR BLD: 6.9 % (ref 4–15)
NEUTROPHILS # BLD AUTO: 4.1 K/UL (ref 1.8–7.7)
NEUTROPHILS NFR BLD: 64 % (ref 38–73)
NITRITE UR QL STRIP: NEGATIVE
NRBC BLD-RTO: 0 /100 WBC
PH UR STRIP: 6 [PH] (ref 5–8)
PLATELET # BLD AUTO: 264 K/UL (ref 150–450)
PMV BLD AUTO: 11 FL (ref 9.2–12.9)
POTASSIUM SERPL-SCNC: 4.2 MMOL/L (ref 3.5–5.1)
PROT SERPL-MCNC: 7.8 G/DL (ref 6–8.4)
PROT UR QL STRIP: NEGATIVE
RBC # BLD AUTO: 4.58 M/UL (ref 4–5.4)
SAMPLE: NORMAL
SODIUM SERPL-SCNC: 142 MMOL/L (ref 136–145)
SP GR UR STRIP: 1.01 (ref 1–1.03)
URN SPEC COLLECT METH UR: NORMAL
UROBILINOGEN UR STRIP-ACNC: NEGATIVE EU/DL
WBC # BLD AUTO: 6.41 K/UL (ref 3.9–12.7)

## 2022-02-22 PROCEDURE — 86803 HEPATITIS C AB TEST: CPT | Performed by: EMERGENCY MEDICINE

## 2022-02-22 PROCEDURE — 81025 URINE PREGNANCY TEST: CPT | Performed by: EMERGENCY MEDICINE

## 2022-02-22 PROCEDURE — 96361 HYDRATE IV INFUSION ADD-ON: CPT

## 2022-02-22 PROCEDURE — 63600175 PHARM REV CODE 636 W HCPCS: Performed by: NURSE PRACTITIONER

## 2022-02-22 PROCEDURE — 82565 ASSAY OF CREATININE: CPT

## 2022-02-22 PROCEDURE — 25500020 PHARM REV CODE 255: Performed by: NURSE PRACTITIONER

## 2022-02-22 PROCEDURE — 96374 THER/PROPH/DIAG INJ IV PUSH: CPT | Mod: 59

## 2022-02-22 PROCEDURE — 85025 COMPLETE CBC W/AUTO DIFF WBC: CPT | Performed by: NURSE PRACTITIONER

## 2022-02-22 PROCEDURE — 99900035 HC TECH TIME PER 15 MIN (STAT)

## 2022-02-22 PROCEDURE — 87389 HIV-1 AG W/HIV-1&-2 AB AG IA: CPT | Performed by: EMERGENCY MEDICINE

## 2022-02-22 PROCEDURE — 99285 EMERGENCY DEPT VISIT HI MDM: CPT | Mod: 25

## 2022-02-22 PROCEDURE — 83690 ASSAY OF LIPASE: CPT | Performed by: NURSE PRACTITIONER

## 2022-02-22 PROCEDURE — 81003 URINALYSIS AUTO W/O SCOPE: CPT | Performed by: EMERGENCY MEDICINE

## 2022-02-22 PROCEDURE — 80053 COMPREHEN METABOLIC PANEL: CPT | Performed by: NURSE PRACTITIONER

## 2022-02-22 PROCEDURE — 96375 TX/PRO/DX INJ NEW DRUG ADDON: CPT

## 2022-02-22 RX ORDER — METHOCARBAMOL 500 MG/1
1000 TABLET, FILM COATED ORAL 3 TIMES DAILY
Qty: 30 TABLET | Refills: 0 | Status: SHIPPED | OUTPATIENT
Start: 2022-02-22 | End: 2022-02-27

## 2022-02-22 RX ORDER — DICLOFENAC SODIUM 10 MG/G
2 GEL TOPICAL 4 TIMES DAILY
Qty: 100 G | Refills: 0 | Status: SHIPPED | OUTPATIENT
Start: 2022-02-22 | End: 2022-06-06

## 2022-02-22 RX ORDER — KETOROLAC TROMETHAMINE 30 MG/ML
15 INJECTION, SOLUTION INTRAMUSCULAR; INTRAVENOUS
Status: COMPLETED | OUTPATIENT
Start: 2022-02-22 | End: 2022-02-22

## 2022-02-22 RX ORDER — NAPROXEN 375 MG/1
375 TABLET ORAL 2 TIMES DAILY WITH MEALS
Qty: 60 TABLET | Refills: 0 | Status: SHIPPED | OUTPATIENT
Start: 2022-02-22 | End: 2022-06-06

## 2022-02-22 RX ORDER — ONDANSETRON 2 MG/ML
4 INJECTION INTRAMUSCULAR; INTRAVENOUS
Status: COMPLETED | OUTPATIENT
Start: 2022-02-22 | End: 2022-02-22

## 2022-02-22 RX ADMIN — ONDANSETRON 4 MG: 2 INJECTION INTRAMUSCULAR; INTRAVENOUS at 01:02

## 2022-02-22 RX ADMIN — IOHEXOL 100 ML: 350 INJECTION, SOLUTION INTRAVENOUS at 02:02

## 2022-02-22 RX ADMIN — KETOROLAC TROMETHAMINE 15 MG: 30 INJECTION, SOLUTION INTRAMUSCULAR at 01:02

## 2022-02-22 RX ADMIN — SODIUM CHLORIDE, SODIUM LACTATE, POTASSIUM CHLORIDE, AND CALCIUM CHLORIDE 1000 ML: .6; .31; .03; .02 INJECTION, SOLUTION INTRAVENOUS at 01:02

## 2022-02-22 NOTE — FIRST PROVIDER EVALUATION
Emergency Department TeleTriage Encounter Note      CHIEF COMPLAINT    Chief Complaint   Patient presents with    Abdominal Pain     Pt reports RLQ since Sunday. Pt states she lifted something heavy on Saturday and thinks the pain is caused from that. Denies N/V/D.        VITAL SIGNS   Initial Vitals [02/22/22 1056]   BP Pulse Resp Temp SpO2   (!) 150/93 82 18 -- --      MAP       --            ALLERGIES    Review of patient's allergies indicates:  No Known Allergies    PROVIDER TRIAGE NOTE  This is a teletriage evaluation of a 36 y.o. female presenting to the ED complaining of right sided abdominal pain that started after lifting a heavy television.  Reports the pain is worse with movement.  Denies associated nausea, vomiting, or diarrhea.  Denies urinary symptoms.     Initial orders will be placed and care will be transferred to an alternate provider when patient is roomed for a full evaluation. Any additional orders and the final disposition will be determined by that provider.           ORDERS  Labs Reviewed   HIV 1 / 2 ANTIBODY   HEPATITIS C ANTIBODY   URINALYSIS, REFLEX TO URINE CULTURE   POCT URINE PREGNANCY       ED Orders (720h ago, onward)    Start Ordered     Status Ordering Provider    02/22/22 1100 02/22/22 1059  HIV 1/2 Ag/Ab (4th Gen)  STAT         Ordered FACUNDO ALEJANDRO P.    02/22/22 1100 02/22/22 1059  Hepatitis C Antibody  STAT         Ordered FACUNDO ALEJANDRO P.    02/22/22 1100 02/22/22 1059  Urinalysis, Reflex to Urine Culture Urine, Clean Catch  STAT         In process FACUNDO ALEJANDRO P.    02/22/22 1100 02/22/22 1059  POCT urine pregnancy  Once         Ordered FACUNDO ALEJANDRO.            Virtual Visit Note: The provider triage portion of this emergency department evaluation and documentation was performed via [employer], a HIPAA-compliant telemedicine application, in concert with a tele-presenter in the room. A face to face patient evaluation with one of my colleagues will  occur once the patient is placed in an emergency department room.      DISCLAIMER: This note was prepared with [employer] voice recognition transcription software. Garbled syntax, mangled pronouns, and other bizarre constructions may be attributed to that software system.

## 2022-02-22 NOTE — Clinical Note
"Azucena "Cindy Magana was seen and treated in our emergency department on 2/22/2022.  She may return to work on 02/25/2022.       If you have any questions or concerns, please don't hesitate to call.      JANNIE Parr"

## 2022-02-22 NOTE — ED NOTES
Pt reports RLQ since Sunday. Pt states she lifted something heavy on Saturday and thinks the pain is caused from that. Denies N/V/D.

## 2022-02-22 NOTE — Clinical Note
"Azucena"Azucena" [place] was seen and treated in our emergency department on 2/22/2022.  She may return with limitations on 02/28/2022.  No heavy lifting, pushing or pulling.       Sincerely,      JANNIE Parr    "

## 2022-02-22 NOTE — ED PROVIDER NOTES
Source of History:  Patient     Chief complaint:  Abdominal Pain (Pt reports RLQ since . Pt states she lifted something heavy on Saturday and thinks the pain is caused from that. Denies N/V/D. )      HPI:  Azucena Magana is a 36 y.o. female presenting to the emergency department with complaint of right-sided lower abdominal pain that began on .  Reports that she lifted up something heavy on Saturday and pain be in shortly afterwards which has progressively worsened since then.  She reports pain is now worse with any movement or palpation.  She states she feels like her lower abdomen is swollen.  She denies any nausea vomiting diarrhea any fever/chills or appetite loss.  She denies any dysuria or back pain.    This is the extent to the patients complaints today here in the emergency department.    ROS: As per HPI and below:  General: No fever.  No chills.  ENT: No sore throat. No ear pain  Chest: No shortness of breath. No cough.    Cardiovascular: No chest pain.   Abdomen:  Abdominal pain  Genito-Urinary: No abnormal urination.    Neurologic: No focal weakness.  No numbness.  No headache  MSK: no back pain.   Integument: No rashes or lesions.    Review of patient's allergies indicates:  No Known Allergies    PMH:  As per HPI and below:  Past Medical History:   Diagnosis Date    Abnormal Pap smear of cervix     Hypertension     Migraine headache     Obesity      Past Surgical History:   Procedure Laterality Date     SECTION N/A 3/10/2021    Procedure:  SECTION;  Surgeon: Joi Najera MD;  Location: [facility] L&D OR;  Service: OB/GYN;  Laterality: N/A;    VA TREAT ECTOPIC PREG,NON REMVAL Right 7/12/15    Right salpingectomy and partial cornual resection       Social History     Tobacco Use    Smoking status: Never Smoker    Smokeless tobacco: Never Used   Substance Use Topics    Alcohol use: No    Drug use: No       Physical Exam:    BP (!) 153/87 (BP Location: Right arm,  "Patient Position: Lying)   Pulse 64   Temp 98.5 °F (36.9 °C) (Oral)   Resp 16   Ht 5' 2" (1.575 m)   Wt 98.6 kg (217 lb 6 oz)   SpO2 100%   BMI 39.76 kg/m²   Vitals:    02/22/22 1056 02/22/22 1518 02/22/22 1536   BP: (!) 150/93 (!) 148/91 (!) 153/87   Pulse: 82 89 64   Resp: 18 18 16   Temp:  98 °F (36.7 °C) 98.5 °F (36.9 °C)   TempSrc:   Oral   SpO2:  96% 100%   Weight: 98.6 kg (217 lb 6 oz)     Height: 5' 2" (1.575 m)         Nursing note and vital signs reviewed.  Appearance: No acute distress.  Well-appearing  Eyes: No conjunctival injection.  Extraocular muscles are intact.  Chest/ Respiratory: Clear to auscultation bilaterally.  Good air movement.  No wheezes.  No rhonchi. No rales. No accessory muscle use.  Cardiovascular: Regular rate and rhythm.  No murmurs. No gallops. No rubs.  Abdomen: Soft.  Right lower abdomen tender to palpation, there is no swelling or distention present no erythema.  No CVA tenderness is noted.  Musculoskeletal: Good range of motion all joints.  No deformities.  Neck supple.  No meningismus.  Skin: No rashes seen.  Good turgor.  No abrasions.  No ecchymoses.  Neuro: alert and oriented x3,  no focal neurological deficits.  Psych: Appropriate, conversant    Labs that have been ordered have been independently reviewed and interpreted by myself.  Labs Reviewed   HIV 1 / 2 ANTIBODY    Narrative:     Release to patient->Immediate   HEPATITIS C ANTIBODY    Narrative:     Release to patient->Immediate   URINALYSIS, REFLEX TO URINE CULTURE    Narrative:     Specimen Source->Urine   CBC W/ AUTO DIFFERENTIAL   COMPREHENSIVE METABOLIC PANEL   LIPASE   POCT URINE PREGNANCY   ISTAT CREATININE       CT Abdomen Pelvis With Contrast   Final Result   Abnormal      No acute abnormalities in the abdomen or pelvis.      Intrauterine device is positioned low within the lower uterine segment.  Arm of the intrauterine device extends beyond the margin of the uterus possibly perforating through the " serosa on the left.  No significant free fluid.  Recommend follow-up with OBGYN.      Cholelithiasis.      Peripherally calcified hypodensity near the uterine fundus possibly fibroid.      2 cm left hepatic lobe hypodensity incompletely characterized though stable when compared to prior MRI possibly representing hemangioma.      This report was flagged in Epic as abnormal.         Electronically signed by: Beau Gill MD   Date:    02/22/2022   Time:    14:54          Initial Impression/ Differential Dx:  Differential diagnosis includes but not limited to: GERD, intestinal spasm, gastroenteritis, gastritis, ulcer, cholecystitis, cholelithiasis, gallstones, pancreatitis, ileus, small bowel obstruction, appendicitis, diverticulitis, colitis, constipation, intestinal gas pain      MDM:    36 y.o. female with gradual onset right-sided lower abdominal pain since Saturday.  Patient reports picked up something heavy Saturday while at work and pain started shortly afterwards.  On exam she had some right-sided abdominal tenderness.  She reports her last menstrual cycle was 2 weeks ago with no history of ovarian cyst.  CT the abdomen was obtained to rule out appendicitis.  CT revealed no acute appendicitis however does show possible uterine perforation from IUD.  I spoke with the OBGYN resident who reviewed the images and bleeds this is an incidental finding and not because of the patient's abdominal pain.  She did discuss however that the IUD does need to be removed.  I discussed these findings with the patient that this is likely a musculoskeletal abdominal strain due to picking up heavy objects.  I did discuss appendicitis remains my differential and she is to return to emergency department develops any fever, worsening abdominal pain or any nausea vomiting diarrhea.  Also discussed following up with OBGYN for IUD removal and replacement if she would like.  She stated understanding.    ED Course as of 02/22/22 7203   [place]  Feb 22, 2022   1319 WBC: 6.41 [AS]   1319 Hemoglobin: 13.1 [AS]   1319 Hematocrit: 39.3 [AS]      ED Course User Index  [AS] JANNIE Parr               Diagnostic Impression:    1. Strain of abdominal wall, initial encounter    2. Displacement of intrauterine contraceptive device, initial encounter         ED Disposition Condition    Discharge Stable          ED Prescriptions     Medication Sig Dispense Start Date End Date Auth. Provider    naproxen (NAPROSYN) 375 MG tablet Take 1 tablet (375 mg total) by mouth 2 (two) times daily with meals. 60 tablet 2/22/2022  JANNIE Parr    methocarbamoL (ROBAXIN) 500 MG Tab Take 2 tablets (1,000 mg total) by mouth 3 (three) times daily. for 5 days 30 tablet 2/22/2022 2/27/2022 JANNIE Parr    diclofenac sodium (VOLTAREN) 1 % Gel Apply 2 g topically 4 (four) times daily. for 10 days 100 g 2/22/2022 3/4/2022 JANNIE Parr        Follow-up Information     Follow up With Specialties Details Why Contact Info    [facility] - Emergency Dept Emergency Medicine Go to  If symptoms worsen [address] [facility] [address] [phone]    Joi Najera MD Obstetrics, Obstetrics and Gynecology Schedule an appointment as soon as possible for a visit in 1 week  [address] [facility] [address] [place] [address]  [phone]            ED Prescriptions     Medication Sig Dispense Start Date End Date Auth. Provider    naproxen (NAPROSYN) 375 MG tablet Take 1 tablet (375 mg total) by mouth 2 (two) times daily with meals. 60 tablet 2/22/2022  JANNIE Parr    methocarbamoL (ROBAXIN) 500 MG Tab Take 2 tablets (1,000 mg total) by mouth 3 (three) times daily. for 5 days 30 tablet 2/22/2022 2/27/2022 JANNIE Parr    diclofenac sodium (VOLTAREN) 1 % Gel Apply 2 g topically 4 (four) times daily. for 10 days 100 g 2/22/2022 3/4/2022 JANNIE Parr FNP  02/22/22 5274

## 2022-02-22 NOTE — Clinical Note
Pattiab FerrerHannah accompanied their spouse to the emergency department on 2/22/2022. They may return to work on 02/23/2022.      If you have any questions or concerns, please don't hesitate to call.      Sierra Cabrera LPN

## 2022-02-23 ENCOUNTER — PES CALL (OUTPATIENT)
Dept: ADMINISTRATIVE | Facility: CLINIC | Age: 37
End: 2022-02-23
Payer: COMMERCIAL

## 2022-03-09 ENCOUNTER — TELEPHONE (OUTPATIENT)
Dept: OBSTETRICS AND GYNECOLOGY | Facility: CLINIC | Age: 37
End: 2022-03-09
Payer: COMMERCIAL

## 2022-03-09 NOTE — TELEPHONE ENCOUNTER
Returned pt call, she may need a sooner appt due to IUD complsints. sal            ----- Message from Xiomara Schuster MA sent at 3/8/2022 11:37 AM CST -----  Type: Patient Call Back    Who called:self    What is the request in detail:pt. Is asking for a call from a nurse to help her schedule an appt. Her doctors 1st available is in April .. she had a ED visit  and was told her IUD is in the wrong area..     Can the clinic reply by [facility]?no    Would the patient rather a call back or a response via [facility]? yes    Best call back number:[phone] (home)

## 2022-03-31 ENCOUNTER — OFFICE VISIT (OUTPATIENT)
Dept: OBSTETRICS AND GYNECOLOGY | Facility: CLINIC | Age: 37
End: 2022-03-31
Payer: MEDICAID

## 2022-03-31 VITALS
SYSTOLIC BLOOD PRESSURE: 182 MMHG | BODY MASS INDEX: 39.07 KG/M2 | DIASTOLIC BLOOD PRESSURE: 116 MMHG | WEIGHT: 213.63 LBS

## 2022-03-31 DIAGNOSIS — Z01.419 VISIT FOR GYNECOLOGIC EXAMINATION: Primary | ICD-10-CM

## 2022-03-31 DIAGNOSIS — Z11.3 VENEREAL DISEASE SCREENING: ICD-10-CM

## 2022-03-31 DIAGNOSIS — Z30.431 ENCOUNTER FOR ROUTINE CHECKING OF INTRAUTERINE CONTRACEPTIVE DEVICE (IUD): ICD-10-CM

## 2022-03-31 DIAGNOSIS — T83.32XD MALPOSITIONED INTRAUTERINE DEVICE (IUD), SUBSEQUENT ENCOUNTER: ICD-10-CM

## 2022-03-31 PROCEDURE — 1160F RVW MEDS BY RX/DR IN RCRD: CPT | Mod: CPTII,,, | Performed by: OBSTETRICS & GYNECOLOGY

## 2022-03-31 PROCEDURE — 87491 CHLMYD TRACH DNA AMP PROBE: CPT | Performed by: OBSTETRICS & GYNECOLOGY

## 2022-03-31 PROCEDURE — 99999 PR PBB SHADOW E&M-EST. PATIENT-LVL III: ICD-10-PCS | Mod: PBBFAC,,, | Performed by: OBSTETRICS & GYNECOLOGY

## 2022-03-31 PROCEDURE — 3080F PR MOST RECENT DIASTOLIC BLOOD PRESSURE >= 90 MM HG: ICD-10-PCS | Mod: CPTII,,, | Performed by: OBSTETRICS & GYNECOLOGY

## 2022-03-31 PROCEDURE — 99999 PR PBB SHADOW E&M-EST. PATIENT-LVL III: CPT | Mod: PBBFAC,,, | Performed by: OBSTETRICS & GYNECOLOGY

## 2022-03-31 PROCEDURE — 99395 PREV VISIT EST AGE 18-39: CPT | Mod: S$PBB,,, | Performed by: OBSTETRICS & GYNECOLOGY

## 2022-03-31 PROCEDURE — 3077F PR MOST RECENT SYSTOLIC BLOOD PRESSURE >= 140 MM HG: ICD-10-PCS | Mod: CPTII,,, | Performed by: OBSTETRICS & GYNECOLOGY

## 2022-03-31 PROCEDURE — 87481 CANDIDA DNA AMP PROBE: CPT | Mod: 59 | Performed by: OBSTETRICS & GYNECOLOGY

## 2022-03-31 PROCEDURE — 1160F PR REVIEW ALL MEDS BY PRESCRIBER/CLIN PHARMACIST DOCUMENTED: ICD-10-PCS | Mod: CPTII,,, | Performed by: OBSTETRICS & GYNECOLOGY

## 2022-03-31 PROCEDURE — 87624 HPV HI-RISK TYP POOLED RSLT: CPT | Performed by: OBSTETRICS & GYNECOLOGY

## 2022-03-31 PROCEDURE — 88175 CYTOPATH C/V AUTO FLUID REDO: CPT | Performed by: OBSTETRICS & GYNECOLOGY

## 2022-03-31 PROCEDURE — 1159F MED LIST DOCD IN RCRD: CPT | Mod: CPTII,,, | Performed by: OBSTETRICS & GYNECOLOGY

## 2022-03-31 PROCEDURE — 87801 DETECT AGNT MULT DNA AMPLI: CPT | Performed by: OBSTETRICS & GYNECOLOGY

## 2022-03-31 PROCEDURE — 3077F SYST BP >= 140 MM HG: CPT | Mod: CPTII,,, | Performed by: OBSTETRICS & GYNECOLOGY

## 2022-03-31 PROCEDURE — 87591 N.GONORRHOEAE DNA AMP PROB: CPT | Mod: 59 | Performed by: OBSTETRICS & GYNECOLOGY

## 2022-03-31 PROCEDURE — 99213 OFFICE O/P EST LOW 20 MIN: CPT | Mod: PBBFAC | Performed by: OBSTETRICS & GYNECOLOGY

## 2022-03-31 PROCEDURE — 3008F BODY MASS INDEX DOCD: CPT | Mod: CPTII,,, | Performed by: OBSTETRICS & GYNECOLOGY

## 2022-03-31 PROCEDURE — 99395 PR PREVENTIVE VISIT,EST,18-39: ICD-10-PCS | Mod: S$PBB,,, | Performed by: OBSTETRICS & GYNECOLOGY

## 2022-03-31 PROCEDURE — 1159F PR MEDICATION LIST DOCUMENTED IN MEDICAL RECORD: ICD-10-PCS | Mod: CPTII,,, | Performed by: OBSTETRICS & GYNECOLOGY

## 2022-03-31 PROCEDURE — 3080F DIAST BP >= 90 MM HG: CPT | Mod: CPTII,,, | Performed by: OBSTETRICS & GYNECOLOGY

## 2022-03-31 PROCEDURE — 3008F PR BODY MASS INDEX (BMI) DOCUMENTED: ICD-10-PCS | Mod: CPTII,,, | Performed by: OBSTETRICS & GYNECOLOGY

## 2022-03-31 NOTE — PROGRESS NOTES
HISTORY OF PRESENT ILLNESS:    Azucena Magana is a 36 y.o. female, , Patient's last menstrual period was 2022 (lmp unknown).,  presents for a routine exam and has no complaints. IUD IN PLACE SINCE , ROSA GARZA.  HAD RECENT EMERGENCY ROOM [employer]  VISIT AND TOLD THAT IMAGING SHOWS IUD IS MALPOSITIONED IN LOWER UTERINE SEGMENT.  EVALUATION WAS DONE FOR RIGHT LOWER QUADRANT PAIN.  ON EXAM, STRING MINIMALLY LENGTHENED, STEM OF IUD NOT NOTED AT EXTERNAL OS.  PATIENT COUNSELED MAY NOT BE EFFECTIVE BIRTH CONTROL.  WOULD LIKE TO HAVE THIS IUD REMOVED IN AND ANOTHER ONE REPLACED, SO WILL ORDER REPLACEMENT AND HAVE HER RETURN WHEN APPROVED.  ALSO WOULD LIKE STD SCREEN FOR PEACE OF MIND.  DECLINED REMOVAL OF IUD TODAY, AND COUNSELED SHE MAY CONTINUE TO HAVE SOME CRAMPING AND IRREGULAR SPOTTING UNTIL ISSUE HAS BEEN RESOLVED  RECENT STRESS DUE TO CHANGE OF JOB FROM SEWERAGE AND WATER TO POST OFFICE, BUT NOW CURRENTLY LAID OFF  2022:  FINDINGS:  Visualized portions of the lung bases and heart show no acute abnormalities.  There is respiratory motion artifact in the abdomen.  2 cm left hepatic lobe hypodensity incompletely characterized but stable as compared to prior MRI.  Hypodensity in the right hepatic lobe is too small to characterize.  No biliary ductal dilatation.  Portal vein is patent.  There are calcified gallstones.  Gallbladder is nondistended.  Stomach, spleen, pancreas and adrenal glands show no significant abnormalities.  Kidneys are normal in contour and attenuation.  There is no evidence of hydronephrosis.  Urinary bladder shows no significant abnormalities.  There is an intrauterine device present within the lower uterine segment.  Arm of the intrauterine device extends beyond the margin of the uterus concerning for perforation of the serosa..  There is a hypodensity near the uterine fundus with peripheral calcifications likely a fibroid.  Adnexal structures show no  significant abnormalities.  Small and large bowel show no evidence of obstruction.  No free air or ascites.  Appendix is normal in caliber without evidence of significant [place] appendiceal inflammatory change.  Abdominal aorta is normal in caliber.  No abnormal lymph node enlargement.  Bones show no abnormalities.  Small fat containing umbilical hernia.  Impression:  No acute abnormalities in the abdomen or pelvis.  Intrauterine device is positioned low within the lower uterine segment.  Arm of the intrauterine device extends beyond the margin of the uterus possibly perforating through the serosa on the left.  No significant free fluid.  Recommend follow-up with OBGYN.  Cholelithiasis.  Peripherally calcified hypodensity near the uterine fundus possibly fibroid.  2 cm left hepatic lobe hypodensity incompletely characterized though stable when compared to prior MRI possibly representing hemangioma.    2021:   5/18/2021 . She is not having any problems with bleeding, cramping, fever or discharge. She HASN'T TRIED to feel the strings, AND PARTNER WITH NO C/O.  HAS HAD SOME MILD IRREGULAR BLEEDING, AND RARE SHARP VAGINAL CRAMPING.  REASSURED EXAM IS AS EXPECTED, AND STRING LENGTH IS APPROPRIATE  She desires IUD for contraception - WILL ORDER AND RETURN FOR INSERTION 3-4 WEEKS.  She reports postpartum depression, IMPROVED WITH CURRENT MEDICATION .  WILL CONT LABETALOL BID (BP MILDLY ELEVATED TODAY) AND F/U PCP GUILLERMO AT [facility] FOR FURTHER BP MANAGEMENT.  RETURN TO WORK PLANNED June.  3/10/2021 rC/S GIRL  Her last pap was LESS THAN 3 YRS [facility] PER PT, DEFERS TODAY  3/2020  The patient presents today following rC/S 4 WEEKS AGO - HX ECTOPIC, R SALPINGECT W/CORNUAL RESECT,  CHR HTN.  BLOOD PRESSURE ELEVATED TODAY AND PATIENT IS VISIBLY UPSET.   PPD NOTED LAST VISIT AND WELLBUTRIN PRESCRIBED BUT PT DID NOT START - WAS WORRIED ABOUT RISKS OF SUICIDE, BUT DENIES SI.  DISCUSSED RISKS/BENEFITS AND SIDE EFFECTS WELLBUTRIN AND I  "THINK WORTHWHILE FOR HER TO TAKE - SAYS SHE WILL START.  HERE TODAY WITH  AND DISCUSSED LIBERALIZING ACTIVITIES, BECAUSE WALKING OUTSIDE TENDS TO MAKE HER FEEL BETTER.  SAYS SHE HAS NO SUPPORT, BUT HER MOTHER HAS BEEN HELPING WITH THE BABY AND IS HOME NOW, AND ARA HAS COME TO ALL RECENT VISITS  LABETALOL 200 BID PRESCRIBED AT DISCHARGE, PT SAYS NOT TAKING (WAS NORMOTENSIVE AT PP CHECK 2 WEEKS AGO WHILE TAKING IT).  DISCUSSED RISKS AND ADVISED TO TAKE HER MEDS - WILL START NOW AND ANOTHER DOSE TONIGHT, CONT TID DOSING, AND F/U TOMORROW WITH BP CHECK.   "HX ECTOPIC, R SALPINGECT W/CORNUAL RESECT, THEN LTCS GIRL, 36W, IUGR,  CHR HTN SUPERIMP PRE-E, BASE P/CR TLTC  BMI 47.   A POS. PAP [place]ULANE 2019  AMA. , NL INTEGR.  ITS A GIRL, 37W 26TH%ILE- ,BREECH  PLAN rC/S  [id number].  - DECL BTL - 3/10 "    Past Medical History:   Diagnosis Date    Abnormal Pap smear of cervix     Hypertension     Migraine headache     Obesity        Past Surgical History:   Procedure Laterality Date     SECTION N/A 3/10/2021    Procedure:  SECTION;  Surgeon: Joi Najera MD;  Location: [facility] L&D OR;  Service: OB/GYN;  Laterality: N/A;    SD TREAT ECTOPIC PREG,NON REMVAL Right 7/12/15    Right salpingectomy and partial cornual resection       MEDICATIONS AND ALLERGIES:      Current Outpatient Medications:     buPROPion (WELLBUTRIN XL) 150 MG TB24 tablet, Take 1 tablet (150 mg total) by mouth every morning., Disp: 30 tablet, Rfl: 5    diclofenac sodium (VOLTAREN) 1 % Gel, Apply 2 g topically 4 (four) times daily. for 10 days, Disp: 100 g, Rfl: 0    hydroCHLOROthiazide (HYDRODIURIL) 25 MG tablet, Take 1 tablet (25 mg total) by mouth once daily. (Patient not taking: Reported on 3/31/2022), Disp: 90 tablet, Rfl: 3    HYDROcodone-acetaminophen (NORCO) 5-325 mg per tablet, Take 1 tablet by mouth every 6 (six) hours as needed for Pain. (Patient not taking: Reported on 3/31/2022), Disp: 30 tablet, Rfl: 0    ibuprofen " (ADVIL,MOTRIN) 800 MG tablet, Take 1 tablet (800 mg total) by mouth every 8 (eight) hours. (Patient not taking: Reported on 3/31/2022), Disp: 40 tablet, Rfl: 0    naproxen (NAPROSYN) 375 MG tablet, Take 1 tablet (375 mg total) by mouth 2 (two) times daily with meals. (Patient not taking: Reported on 3/31/2022), Disp: 60 tablet, Rfl: 0    NIFEdipine (PROCARDIA-XL) 30 MG (OSM) 24 hr tablet, Take 1 tablet (30 mg total) by mouth once daily., Disp: 30 tablet, Rfl: 0    Review of patient's allergies indicates:  No Known Allergies    Family History   Problem Relation Age of Onset    Ovarian cancer Maternal Grandmother     Colon cancer Neg Hx     Breast cancer Neg Hx        Social History     Socioeconomic History    Marital status: Single   Tobacco Use    Smoking status: Never Smoker    Smokeless tobacco: Never Used   Substance and Sexual Activity    Alcohol use: No    Drug use: No    Sexual activity: Yes     Partners: Male     Birth control/protection: None       COMPREHENSIVE GYN HISTORY:  PAP History: Denies abnormal Paps.  Infection History: Denies STDs. Denies PID.  Benign History: Denies uterine fibroids. Denies ovarian cysts. Denies endometriosis. Denies other conditions.  Cancer History: Denies cervical cancer. Denies uterine cancer or hyperplasia. Denies ovarian cancer. Denies vulvar cancer or pre-cancer. Denies vaginal cancer or pre-cancer. Denies breast cancer. Denies colon cancer.  Sexual Activity History: Reports currently being sexually active  Menstrual History: Monthly, mild-moderate.  Contraception:IUD    ROS:  GENERAL: No weight changes. No swelling. No fatigue. No fever.  CARDIOVASCULAR: No chest pain. No shortness of breath. No leg cramps.   NEUROLOGICAL: No headaches. No vision changes.  BREASTS: No pain. No lumps. No discharge.  ABDOMEN: No pain. No nausea. No vomiting. No diarrhea. No constipation.  REPRODUCTIVE: No abnormal bleeding.   VULVA: No pain. No lesions. No itching.  VAGINA: No  relaxation. No itching. No odor. No discharge. No lesions.  URINARY: No incontinence. No nocturia. No frequency. No dysuria.    BP (!) 182/116   Wt 96.9 kg (213 lb 10 oz)   LMP 03/31/2022 (LMP Unknown) Comment: pt unsure  BMI 39.07 kg/m²     PE:  APPEARANCE: Well nourished, well developed, in no acute distress.  AFFECT: WNL, alert and oriented x 3.  SKIN: No acne or hirsutism.  NECK: Neck symmetric, without masses or thyromegaly.  NODES: No inguinal, cervical, axillary or femoral lymph node enlargement.  CHEST: Good respiratory effort.   ABDOMEN: Soft. No tenderness or masses. No hepatosplenomegaly. No hernias.  BREASTS: Symmetrical, no skin changes, visible lesions, palpable masses or nipple discharge bilaterally.  PELVIC: External female genitalia without lesions.  Female hair distribution. Adequate perineal body, Normal urethral meatus. Vagina moist and well rugated without lesions or discharge.  No significant cystocele or rectocele present. Cervix pink without lesions, discharge or tenderness. Uterus is normal size, regular, mobile and nontender. Adnexa without masses or tenderness.  EXTREMITIES: No edema    PROCEDURES:  Pap    DIAGNOSIS:  1. Visit for gynecologic examination  Liquid-Based Pap Smear, Screening    HPV High Risk Genotypes, PCR   2. Encounter for routine checking of intrauterine contraceptive device (IUD)     3. Venereal disease screening  HIV 1/2 Ag/Ab (4th Gen)    RPR    Hepatitis B Surface Antigen    Hepatitis C Antibody    Vaginosis Screen by DNA Probe    C. trachomatis/N. gonorrhoeae by AMP DNA   4. Malpositioned intrauterine device (IUD), subsequent encounter         LABS AND TESTS ORDERED:    MEDICATIONS PRESCRIBED:    COUNSELING:   The patient was counseled today on ACS PAP guidelines, with recommendations for yearly pelvic exams unless their uterus, cervix, and ovaries were removed for benign reasons; in that case, examinations every 3-5 years are recommended.  The patient was also  counseled regarding monthly breast self-examination, routine STD screening for at-risk populations, prophylactic immunizations for transmitted infections such as  HPV, Pertussis, or Influenza as appropriate, and yearly mammograms when indicated by ACS guidelines.  She was advised to see her primary care physician for all other health maintenance.    FOLLOW-UP with me annually.

## 2022-04-01 ENCOUNTER — LAB VISIT (OUTPATIENT)
Dept: LAB | Facility: HOSPITAL | Age: 37
End: 2022-04-01
Attending: OBSTETRICS & GYNECOLOGY
Payer: COMMERCIAL

## 2022-04-01 DIAGNOSIS — Z11.3 VENEREAL DISEASE SCREENING: ICD-10-CM

## 2022-04-01 LAB
HBV SURFACE AG SERPL QL IA: NEGATIVE
HCV AB SERPL QL IA: NEGATIVE
HIV 1+2 AB+HIV1 P24 AG SERPL QL IA: NEGATIVE

## 2022-04-01 PROCEDURE — 36415 COLL VENOUS BLD VENIPUNCTURE: CPT | Performed by: OBSTETRICS & GYNECOLOGY

## 2022-04-01 PROCEDURE — 87340 HEPATITIS B SURFACE AG IA: CPT | Performed by: OBSTETRICS & GYNECOLOGY

## 2022-04-01 PROCEDURE — 86592 SYPHILIS TEST NON-TREP QUAL: CPT | Performed by: OBSTETRICS & GYNECOLOGY

## 2022-04-01 PROCEDURE — 86803 HEPATITIS C AB TEST: CPT | Performed by: OBSTETRICS & GYNECOLOGY

## 2022-04-01 PROCEDURE — 87389 HIV-1 AG W/HIV-1&-2 AB AG IA: CPT | Performed by: OBSTETRICS & GYNECOLOGY

## 2022-04-02 LAB — RPR SER QL: NORMAL

## 2022-04-04 LAB
C TRACH DNA SPEC QL NAA+PROBE: NOT DETECTED
N GONORRHOEA DNA SPEC QL NAA+PROBE: NOT DETECTED

## 2022-04-05 ENCOUNTER — TELEPHONE (OUTPATIENT)
Dept: OBSTETRICS AND GYNECOLOGY | Facility: CLINIC | Age: 37
End: 2022-04-05
Payer: COMMERCIAL

## 2022-04-05 LAB
BACTERIAL VAGINOSIS DNA: POSITIVE
CANDIDA GLABRATA DNA: NEGATIVE
CANDIDA KRUSEI DNA: NEGATIVE
CANDIDA RRNA VAG QL PROBE: NEGATIVE
T VAGINALIS RRNA GENITAL QL PROBE: NEGATIVE

## 2022-04-05 NOTE — TELEPHONE ENCOUNTER
----- Message from Xiomara Schuster MA sent at 4/5/2022  9:02 AM CDT -----  Type: Patient Call Back    Who called:self    What is the request in detail:pt. Was told by her provider to schedule an appt. For the 14th of April.. nothing was available for her to do so.. please help reschedule the appt. In May for her ..     Can the clinic reply by [facility]?no    Would the patient rather a call back or a response via [facility]? yes    Best call back number:[phone] (home)

## 2022-04-08 LAB
CLINICAL INFO: NORMAL
CYTO CVX: NORMAL
CYTOLOGIST CVX/VAG CYTO: NORMAL
CYTOLOGIST CVX/VAG CYTO: NORMAL
CYTOLOGY CMNT CVX/VAG CYTO-IMP: NORMAL
CYTOLOGY PAP THIN PREP EXPLANATION: NORMAL
DATE OF PREVIOUS PAP: NORMAL
DATE PREVIOUS BX: NO
GEN CATEG CVX/VAG CYTO-IMP: NORMAL
HPV E6+E7 MRNA CVX QL NAA+PROBE: NOT DETECTED
LMP START DATE: NORMAL
MICROORGANISM CVX/VAG CYTO: NORMAL
PATHOLOGIST CVX/VAG CYTO: NORMAL
SERVICE CMNT-IMP: NORMAL
SPECIMEN SOURCE CVX/VAG CYTO: NORMAL
STAT OF ADQ CVX/VAG CYTO-IMP: NORMAL

## 2022-04-12 ENCOUNTER — PATIENT MESSAGE (OUTPATIENT)
Dept: OBSTETRICS AND GYNECOLOGY | Facility: CLINIC | Age: 37
End: 2022-04-12
Payer: COMMERCIAL

## 2022-04-12 DIAGNOSIS — N76.0 BACTERIAL VAGINOSIS: Primary | ICD-10-CM

## 2022-04-12 DIAGNOSIS — B96.89 BACTERIAL VAGINOSIS: Primary | ICD-10-CM

## 2022-04-12 RX ORDER — METRONIDAZOLE 500 MG/1
500 TABLET ORAL 2 TIMES DAILY
Qty: 14 TABLET | Refills: 0 | Status: SHIPPED | OUTPATIENT
Start: 2022-04-12 | End: 2022-04-19

## 2022-04-20 ENCOUNTER — PATIENT MESSAGE (OUTPATIENT)
Dept: OBSTETRICS AND GYNECOLOGY | Facility: CLINIC | Age: 37
End: 2022-04-20
Payer: COMMERCIAL

## 2022-04-27 ENCOUNTER — OFFICE VISIT (OUTPATIENT)
Dept: OBSTETRICS AND GYNECOLOGY | Facility: CLINIC | Age: 37
End: 2022-04-27
Payer: MEDICAID

## 2022-04-27 VITALS
BODY MASS INDEX: 38.15 KG/M2 | DIASTOLIC BLOOD PRESSURE: 88 MMHG | SYSTOLIC BLOOD PRESSURE: 142 MMHG | WEIGHT: 208.56 LBS

## 2022-04-27 DIAGNOSIS — Z30.8 ENCOUNTER FOR OTHER CONTRACEPTIVE MANAGEMENT: Primary | ICD-10-CM

## 2022-04-27 PROCEDURE — 1159F MED LIST DOCD IN RCRD: CPT | Mod: CPTII,,, | Performed by: OBSTETRICS & GYNECOLOGY

## 2022-04-27 PROCEDURE — 99212 OFFICE O/P EST SF 10 MIN: CPT | Mod: S$PBB,,, | Performed by: OBSTETRICS & GYNECOLOGY

## 2022-04-27 PROCEDURE — 3079F PR MOST RECENT DIASTOLIC BLOOD PRESSURE 80-89 MM HG: ICD-10-PCS | Mod: CPTII,,, | Performed by: OBSTETRICS & GYNECOLOGY

## 2022-04-27 PROCEDURE — 99999 PR PBB SHADOW E&M-EST. PATIENT-LVL III: CPT | Mod: PBBFAC,,, | Performed by: OBSTETRICS & GYNECOLOGY

## 2022-04-27 PROCEDURE — 3008F BODY MASS INDEX DOCD: CPT | Mod: CPTII,,, | Performed by: OBSTETRICS & GYNECOLOGY

## 2022-04-27 PROCEDURE — 3079F DIAST BP 80-89 MM HG: CPT | Mod: CPTII,,, | Performed by: OBSTETRICS & GYNECOLOGY

## 2022-04-27 PROCEDURE — 99213 OFFICE O/P EST LOW 20 MIN: CPT | Mod: PBBFAC | Performed by: OBSTETRICS & GYNECOLOGY

## 2022-04-27 PROCEDURE — 99212 PR OFFICE/OUTPT VISIT, EST, LEVL II, 10-19 MIN: ICD-10-PCS | Mod: S$PBB,,, | Performed by: OBSTETRICS & GYNECOLOGY

## 2022-04-27 PROCEDURE — 1159F PR MEDICATION LIST DOCUMENTED IN MEDICAL RECORD: ICD-10-PCS | Mod: CPTII,,, | Performed by: OBSTETRICS & GYNECOLOGY

## 2022-04-27 PROCEDURE — 3077F PR MOST RECENT SYSTOLIC BLOOD PRESSURE >= 140 MM HG: ICD-10-PCS | Mod: CPTII,,, | Performed by: OBSTETRICS & GYNECOLOGY

## 2022-04-27 PROCEDURE — 99999 PR PBB SHADOW E&M-EST. PATIENT-LVL III: ICD-10-PCS | Mod: PBBFAC,,, | Performed by: OBSTETRICS & GYNECOLOGY

## 2022-04-27 PROCEDURE — 3008F PR BODY MASS INDEX (BMI) DOCUMENTED: ICD-10-PCS | Mod: CPTII,,, | Performed by: OBSTETRICS & GYNECOLOGY

## 2022-04-27 PROCEDURE — 3077F SYST BP >= 140 MM HG: CPT | Mod: CPTII,,, | Performed by: OBSTETRICS & GYNECOLOGY

## 2022-04-27 NOTE — PROGRESS NOTES
HISTORY OF PRESENT ILLNESS:    Azucena Magana is a 36 y.o. female, , Patient's last menstrual period was 2022 (lmp unknown).,  presents for REMOVAL REPLACEMENT OF IUD - PROCEDURE DEFERRED BECAUSE PT TOOK PLAN B FOR UNPROTECTED INTERCOURSE WITHIN THE PAST 48 HOURS.  HAS NOT HAD SIGNIFICANT BLEEDING, BUT NOTED SIGNIFICANT BLEEDING AFTER USING PLAN B LAST MONTH.  DISCUSSED RISKS OF EARLY PREGNANCY TERMINATION AS WELL AS COEXISTING IUP AND INTRAUTERINE DEVICE.  WILL DEFER REMOVAL/REPLACEMENT X 1 MO, AND PT WILL USE CONDOMS, TAKE MONSERRAT.    3/2022:  . IUD IN PLACE SINCE , COTEST SUBMITTED.  HAD RECENT EMERGENCY ROOM [employer]  VISIT AND TOLD THAT IMAGING SHOWS IUD IS MALPOSITIONED IN LOWER UTERINE SEGMENT.  EVALUATION WAS DONE FOR RIGHT LOWER QUADRANT PAIN.  ON EXAM, STRING MINIMALLY LENGTHENED, STEM OF IUD NOT NOTED AT EXTERNAL OS.  PATIENT COUNSELED MAY NOT BE EFFECTIVE BIRTH CONTROL.  WOULD LIKE TO HAVE THIS IUD REMOVED IN AND ANOTHER ONE REPLACED, SO WILL ORDER REPLACEMENT AND HAVE HER RETURN WHEN APPROVED.  ALSO WOULD LIKE STD SCREEN FOR PEACE OF MIND.  DECLINED REMOVAL OF IUD TODAY, AND COUNSELED SHE MAY CONTINUE TO HAVE SOME CRAMPING AND IRREGULAR SPOTTING UNTIL ISSUE HAS BEEN RESOLVED  RECENT STRESS DUE TO CHANGE OF JOB FROM SEWERAGE AND WATER TO POST OFFICE, BUT NOW CURRENTLY LAID OFF  :   2021 . She is not having any problems with bleeding, cramping, fever or discharge. She HASN'T TRIED to feel the strings, AND PARTNER WITH NO C/O.  HAS HAD SOME MILD IRREGULAR BLEEDING, AND RARE SHARP VAGINAL CRAMPING.  REASSURED EXAM IS AS EXPECTED, AND STRING LENGTH IS APPROPRIATE  She desires IUD for contraception - WILL ORDER AND RETURN FOR INSERTION 3-4 WEEKS.  She reports postpartum depression, IMPROVED WITH CURRENT MEDICATION .  WILL CONT LABETALOL BID (BP MILDLY ELEVATED TODAY) AND F/U PCP MIMA AT [facility] FOR FURTHER BP MANAGEMENT.  RETURN TO WORK PLANNED .  3/10/2021  "rC/S GIRL  Her last pap was LESS THAN 3 YRS [place] PER PT, DEFERS TODAY  3/2020  The patient presents today following rC/S 4 WEEKS AGO - HX ECTOPIC, R SALPINGECT W/CORNUAL RESECT,  CHR HTN.  BLOOD PRESSURE ELEVATED TODAY AND PATIENT IS VISIBLY UPSET.   PPD NOTED LAST VISIT AND WELLBUTRIN PRESCRIBED BUT PT DID NOT START - WAS WORRIED ABOUT RISKS OF SUICIDE, BUT DENIES SI.  DISCUSSED RISKS/BENEFITS AND SIDE EFFECTS WELLBUTRIN AND I THINK WORTHWHILE FOR HER TO TAKE - SAYS SHE WILL START.  HERE TODAY WITH  AND DISCUSSED LIBERALIZING ACTIVITIES, BECAUSE WALKING OUTSIDE TENDS TO MAKE HER FEEL BETTER.  SAYS SHE HAS NO SUPPORT, BUT HER MOTHER HAS BEEN HELPING WITH THE BABY AND IS HOME NOW, AND HUSB HAS COME TO ALL RECENT VISITS  LABETALOL 200 BID PRESCRIBED AT DISCHARGE, PT SAYS NOT TAKING (WAS NORMOTENSIVE AT PP CHECK 2 WEEKS AGO WHILE TAKING IT).  DISCUSSED RISKS AND ADVISED TO TAKE HER MEDS - WILL START NOW AND ANOTHER DOSE TONIGHT, CONT TID DOSING, AND F/U TOMORROW WITH BP CHECK.   "HX ECTOPIC, R SALPINGECT W/CORNUAL RESECT, THEN LTCS GIRL, 36W, IUGR,  CHR HTN SUPERIMP PRE-E, BASE P/CR TLTC  BMI 47.   A POS. PAP [place] 2019  AMA. , NL INTEGR.  ITS A GIRL, 37W 26TH%ILE- ,BREECH  PLAN rC/S  [id number].  - DECL BTL - 3/10 "    Past Medical History:   Diagnosis Date    Abnormal Pap smear of cervix     Hypertension     Migraine headache     Obesity        Past Surgical History:   Procedure Laterality Date     SECTION N/A 3/10/2021    Procedure:  SECTION;  Surgeon: Joi Najera MD;  Location: [facility] L&D OR;  Service: OB/GYN;  Laterality: N/A;    AK TREAT ECTOPIC PREG,NON REMVAL Right 7/12/15    Right salpingectomy and partial cornual resection       MEDICATIONS AND ALLERGIES:      Current Outpatient Medications:     hydroCHLOROthiazide (HYDRODIURIL) 25 MG tablet, Take 1 tablet (25 mg total) by mouth once daily., Disp: 90 tablet, Rfl: 3    buPROPion (WELLBUTRIN XL) 150 MG TB24 tablet, Take 1 " tablet (150 mg total) by mouth every morning., Disp: 30 tablet, Rfl: 5    diclofenac sodium (VOLTAREN) 1 % Gel, Apply 2 g topically 4 (four) times daily. for 10 days, Disp: 100 g, Rfl: 0    HYDROcodone-acetaminophen (NORCO) 5-325 mg per tablet, Take 1 tablet by mouth every 6 (six) hours as needed for Pain. (Patient not taking: No sig reported), Disp: 30 tablet, Rfl: 0    ibuprofen (ADVIL,MOTRIN) 800 MG tablet, Take 1 tablet (800 mg total) by mouth every 8 (eight) hours. (Patient not taking: No sig reported), Disp: 40 tablet, Rfl: 0    naproxen (NAPROSYN) 375 MG tablet, Take 1 tablet (375 mg total) by mouth 2 (two) times daily with meals. (Patient not taking: No sig reported), Disp: 60 tablet, Rfl: 0    NIFEdipine (PROCARDIA-XL) 30 MG (OSM) 24 hr tablet, Take 1 tablet (30 mg total) by mouth once daily., Disp: 30 tablet, Rfl: 0    norgestimate-ethinyl estradioL (ORTHO-CYCLEN) 0.25-35 mg-mcg per tablet, Take 1 tablet by mouth once daily., Disp: 28 tablet, Rfl: 1    Review of patient's allergies indicates:  No Known Allergies    Family History   Problem Relation Age of Onset    Ovarian cancer Maternal Grandmother     Colon cancer Neg Hx     Breast cancer Neg Hx        Social History     Socioeconomic History    Marital status: Single   Tobacco Use    Smoking status: Never Smoker    Smokeless tobacco: Never Used   Substance and Sexual Activity    Alcohol use: No    Drug use: No    Sexual activity: Yes     Partners: Male     Birth control/protection: I.U.D.       COMPREHENSIVE GYN HISTORY:  PAP History: Denies abnormal Paps.  Infection History: Denies STDs. Denies PID.  Benign History: Denies uterine fibroids. Denies ovarian cysts. Denies endometriosis. Denies other conditions.  Cancer History: Denies cervical cancer. Denies uterine cancer or hyperplasia. Denies ovarian cancer. Denies vulvar cancer or pre-cancer. Denies vaginal cancer or pre-cancer. Denies breast cancer. Denies colon  cancer.    ROS:  GENERAL: No weight changes. No swelling. No fatigue. No fever.  CARDIOVASCULAR: No chest pain. No shortness of breath. No leg cramps.   NEUROLOGICAL: No headaches. No vision changes.  BREASTS: No pain. No lumps. No discharge.  ABDOMEN: No pain. No nausea. No vomiting. No diarrhea. No constipation.  REPRODUCTIVE: No abnormal bleeding.   VULVA: No pain. No lesions. No itching.  VAGINA: No relaxation. No itching. No odor. No discharge. No lesions.  URINARY: No incontinence. No nocturia. No frequency. No dysuria.    BP (!) 142/88   Wt 94.6 kg (208 lb 8.9 oz)   LMP 03/31/2022 (LMP Unknown) Comment: pt unsure  BMI 38.15 kg/m²     PE:  APPEARANCE: Well nourished, well developed, in no acute distress.  REMAINDER DEFERRED    PROCEDURES:    DIAGNOSIS:  1. Encounter for other contraceptive management         LABS AND TESTS ORDERED:    MEDICATIONS PRESCRIBED:    COUNSELING:   The patient was counseled AS ABOVE    FOLLOW-UP with me routine visit.

## 2022-04-27 NOTE — LETTER
April 27, 2022      [facility] - OB GYN  [address]  [place] [address]  Phone: [phone]       Patient: Azucena Magana   YOB: 1985  Date of Visit: 04/27/2022    To Whom It May Concern:    Arlene Magana  was at [facility] on 04/27/2022. The patient may return to work 4/24/22 with no restrictions. If you have any questions or concerns, or if I can be of further assistance, please do not hesitate to contact me.    Sincerely,    Jimena Dugan LPN

## 2022-04-30 RX ORDER — NORGESTIMATE AND ETHINYL ESTRADIOL 0.25-0.035
1 KIT ORAL DAILY
Qty: 28 TABLET | Refills: 1 | Status: SHIPPED | OUTPATIENT
Start: 2022-04-30 | End: 2022-06-06

## 2022-05-24 ENCOUNTER — PROCEDURE VISIT (OUTPATIENT)
Dept: OBSTETRICS AND GYNECOLOGY | Facility: CLINIC | Age: 37
End: 2022-05-24
Payer: MEDICAID

## 2022-05-24 ENCOUNTER — PATIENT MESSAGE (OUTPATIENT)
Dept: OBSTETRICS AND GYNECOLOGY | Facility: CLINIC | Age: 37
End: 2022-05-24

## 2022-05-24 ENCOUNTER — HOSPITAL ENCOUNTER (OUTPATIENT)
Dept: RADIOLOGY | Facility: OTHER | Age: 37
Discharge: HOME | End: 2022-05-24
Attending: OBSTETRICS & GYNECOLOGY
Payer: MEDICAID

## 2022-05-24 VITALS
SYSTOLIC BLOOD PRESSURE: 158 MMHG | WEIGHT: 209.19 LBS | BODY MASS INDEX: 38.27 KG/M2 | DIASTOLIC BLOOD PRESSURE: 100 MMHG

## 2022-05-24 DIAGNOSIS — T83.32XD MALPOSITIONED INTRAUTERINE DEVICE (IUD), SUBSEQUENT ENCOUNTER: ICD-10-CM

## 2022-05-24 DIAGNOSIS — T83.32XA MALPOSITIONED INTRAUTERINE DEVICE: ICD-10-CM

## 2022-05-24 DIAGNOSIS — Z30.430 ENCOUNTER FOR INSERTION OF INTRAUTERINE CONTRACEPTIVE DEVICE (IUD): Primary | ICD-10-CM

## 2022-05-24 PROCEDURE — 99214 PR OFFICE/OUTPT VISIT, EST, LEVL IV, 30-39 MIN: ICD-10-PCS | Mod: 25,S$PBB,, | Performed by: OBSTETRICS & GYNECOLOGY

## 2022-05-24 PROCEDURE — 76856 US PELVIS COMP WITH TRANSVAG NON-OB (XPD): ICD-10-PCS | Mod: 26,,, | Performed by: RADIOLOGY

## 2022-05-24 PROCEDURE — 58301 REMOVE INTRAUTERINE DEVICE: CPT | Mod: 59,PBBFAC | Performed by: OBSTETRICS & GYNECOLOGY

## 2022-05-24 PROCEDURE — 76830 TRANSVAGINAL US NON-OB: CPT | Mod: 26,,, | Performed by: RADIOLOGY

## 2022-05-24 PROCEDURE — 76856 US EXAM PELVIC COMPLETE: CPT | Mod: 26,,, | Performed by: RADIOLOGY

## 2022-05-24 PROCEDURE — 58301 PR REMOVE, INTRAUTERINE DEVICE: ICD-10-PCS | Mod: 52,S$PBB,, | Performed by: OBSTETRICS & GYNECOLOGY

## 2022-05-24 PROCEDURE — 58301 REMOVE INTRAUTERINE DEVICE: CPT | Mod: 52,S$PBB,, | Performed by: OBSTETRICS & GYNECOLOGY

## 2022-05-24 PROCEDURE — 76830 TRANSVAGINAL US NON-OB: CPT | Mod: TC

## 2022-05-24 PROCEDURE — 99214 OFFICE O/P EST MOD 30 MIN: CPT | Mod: 25,S$PBB,, | Performed by: OBSTETRICS & GYNECOLOGY

## 2022-05-24 PROCEDURE — 76830 US PELVIS COMP WITH TRANSVAG NON-OB (XPD): ICD-10-PCS | Mod: 26,,, | Performed by: RADIOLOGY

## 2022-05-24 NOTE — PROCEDURES
Procedures     IUD PLACEMENT:    Azucena Magana is a 36 y.o. female , who presents for IUD placement.  Patient's last menstrual period was 2022..  UPT is negative.    2022:  for REMOVAL REPLACEMENT OF IUD - PROCEDURE DEFERRED BECAUSE PT TOOK PLAN B FOR UNPROTECTED INTERCOURSE WITHIN THE PAST 48 HOURS.  HAS NOT HAD SIGNIFICANT BLEEDING, BUT NOTED SIGNIFICANT BLEEDING AFTER USING PLAN B LAST MONTH.  DISCUSSED RISKS OF EARLY PREGNANCY TERMINATION AS WELL AS COEXISTING IUP AND INTRAUTERINE DEVICE.  WILL DEFER REMOVAL/REPLACEMENT X 1 MO, AND PT WILL USE CONDOMS, TAKE MONSERRAT.  3/2022:  . IUD IN PLACE SINCE , COTEST SUBMITTED.  HAD RECENT EMERGENCY ROOM [employer]  VISIT AND TOLD THAT IMAGING SHOWS IUD IS MALPOSITIONED IN LOWER UTERINE SEGMENT.  EVALUATION WAS DONE FOR RIGHT LOWER QUADRANT PAIN.  ON EXAM, STRING MINIMALLY LENGTHENED, STEM OF IUD NOT NOTED AT EXTERNAL OS.  PATIENT COUNSELED MAY NOT BE EFFECTIVE BIRTH CONTROL.  WOULD LIKE TO HAVE THIS IUD REMOVED IN AND ANOTHER ONE REPLACED, SO WILL ORDER REPLACEMENT AND HAVE HER RETURN WHEN APPROVED.  ALSO WOULD LIKE STD SCREEN FOR PEACE OF MIND.  DECLINED REMOVAL OF IUD TODAY, AND COUNSELED SHE MAY CONTINUE TO HAVE SOME CRAMPING AND IRREGULAR SPOTTING UNTIL ISSUE HAS BEEN RESOLVED  RECENT STRESS DUE TO CHANGE OF JOB FROM SEWERAGE AND WATER TO POST OFFICE, BUT NOW CURRENTLY LAID OFF  :   2021 . She is not having any problems with bleeding, cramping, fever or discharge. She HASN'T TRIED to feel the strings, AND PARTNER WITH NO C/O.  HAS HAD SOME MILD IRREGULAR BLEEDING, AND RARE SHARP VAGINAL CRAMPING.  REASSURED EXAM IS AS EXPECTED, AND STRING LENGTH IS APPROPRIATE  She desires IUD for contraception - WILL ORDER AND RETURN FOR INSERTION 3-4 WEEKS.  She reports postpartum depression, IMPROVED WITH CURRENT MEDICATION .  WILL CONT LABETALOL BID (BP MILDLY ELEVATED TODAY) AND F/U PCP MIMA AT [facility] FOR FURTHER BP MANAGEMENT.  RETURN TO  "WORK PLANNED June.  3/10/2021 rC/S GIRL  Her last pap was LESS THAN 3 YRS [place]UL[place] PER PT, DEFERS TODAY  3/2020  The patient presents today following rC/S 4 WEEKS AGO - HX ECTOPIC, R SALPINGECT W/CORNUAL RESECT,  CHR HTN.  BLOOD PRESSURE ELEVATED TODAY AND PATIENT IS VISIBLY UPSET.   PPD NOTED LAST VISIT AND WELLBUTRIN PRESCRIBED BUT PT DID NOT START - WAS WORRIED ABOUT RISKS OF SUICIDE, BUT DENIES SI.  DISCUSSED RISKS/BENEFITS AND SIDE EFFECTS WELLBUTRIN AND I THINK WORTHWHILE FOR HER TO TAKE - SAYS SHE WILL START.  HERE TODAY WITH  AND DISCUSSED LIBERALIZING ACTIVITIES, BECAUSE WALKING OUTSIDE TENDS TO MAKE HER FEEL BETTER.  SAYS SHE HAS NO SUPPORT, BUT HER MOTHER HAS BEEN HELPING WITH THE BABY AND IS HOME NOW, AND HUSB HAS COME TO ALL RECENT VISITS  LABETALOL 200 BID PRESCRIBED AT DISCHARGE, PT SAYS NOT TAKING (WAS NORMOTENSIVE AT PP CHECK 2 WEEKS AGO WHILE TAKING IT).  DISCUSSED RISKS AND ADVISED TO TAKE HER MEDS - WILL START NOW AND ANOTHER DOSE TONIGHT, CONT TID DOSING, AND F/U TOMORROW WITH BP CHECK.   "HX ECTOPIC, R SALPINGECT W/CORNUAL RESECT, THEN LTCS GIRL, 36W, IUGR,  CHR HTN SUPERIMP PRE-E, BASE P/CR TLTC  BMI 47.   A POS. PAP TULANE 2019  AMA. , NL INTEGR.  ITS A GIRL, 37W 26TH%ILE- ,BREECH  PLAN rC/S  [id number].  - DECL BTL - 3/10 "    Pre IUD Placement Counseling:  Contraceptive options were reviewed with the patient , and she chooses Mirena.  Her history was reviewed, and there are no contraindications to an IUD.  The procedure was explained, including minimal risks of pain, bleeding, uterine perforation, infection associated with insertion, and spontaneous expulsion within the first two weeks.  The benefits of contraception without systemic side effects and amenorrhea or hypomenorrhea were discussed.  The patient's questions were answered, and she agrees to proceed.  Hospital and device consent (if provided by ) were signed.    A speculum was placed within the vagina and the cervix " was visualized.  The cervix was cleaned with betadine swabs times three.  The anterior cervical lip was grasped with a single-tooth tenaculum, BUT THE STRAIN COULD NOT BE VISUALIZED NOR COULD NOT BE GRASPED USING A UTERINE DRESSING FORCEPS PASSED TO THE INTERNAL OS.  IMAGES FROM PRIOR CT REVIEWED AND DUE TO POSSIBLE EMBEDDED ARM OF IUD IN ANTERIOR LOWER UTERINE SEGMENT, DECISION WAS MADE TO TERMINATE PROCEDURE.  ULTRASOUND CONFIRMING CONTINUED PRESENCE OF IUD WAS ORDERED, AND WILL PLAN FOR HYSTEROSCOPY WITH REMOVAL OF IUD POSSIBLE LAPAROSCOPY.  POSSIBLE RESECTION OF SUBMUCOUS FIBROID, POSSIBLE REPLACEMENT OF IUD.

## 2022-05-25 RX ORDER — MUPIROCIN 20 MG/G
OINTMENT TOPICAL
Status: CANCELLED | OUTPATIENT
Start: 2022-05-25

## 2022-05-25 RX ORDER — SODIUM CHLORIDE 9 MG/ML
INJECTION, SOLUTION INTRAVENOUS CONTINUOUS
Status: CANCELLED | OUTPATIENT
Start: 2022-05-25

## 2022-05-27 ENCOUNTER — TELEPHONE (OUTPATIENT)
Dept: OBSTETRICS AND GYNECOLOGY | Facility: CLINIC | Age: 37
End: 2022-05-27
Payer: MEDICAID

## 2022-05-27 NOTE — TELEPHONE ENCOUNTER
----- Message from India Gr sent at 5/25/2022  9:11 AM CDT -----  Type:  Patient Returning Call    Who Called: self     Who Left Message for Patient: Dr. Najera     Does the patient know what this is regarding?: yes     Would the patient rather a call back or a response via [facility]? Call back     Best Call Back Number: [phone]

## 2022-06-06 ENCOUNTER — ANESTHESIA EVENT (OUTPATIENT)
Dept: SURGERY | Facility: HOSPITAL | Age: 37
End: 2022-06-06
Payer: MEDICAID

## 2022-06-06 ENCOUNTER — HOSPITAL ENCOUNTER (OUTPATIENT)
Dept: PREADMISSION TESTING | Facility: HOSPITAL | Age: 37
Discharge: HOME | End: 2022-06-06
Attending: OBSTETRICS & GYNECOLOGY
Payer: MEDICAID

## 2022-06-06 ENCOUNTER — OFFICE VISIT (OUTPATIENT)
Dept: OBSTETRICS AND GYNECOLOGY | Facility: CLINIC | Age: 37
End: 2022-06-06
Payer: MEDICAID

## 2022-06-06 VITALS
SYSTOLIC BLOOD PRESSURE: 154 MMHG | BODY MASS INDEX: 37.08 KG/M2 | DIASTOLIC BLOOD PRESSURE: 108 MMHG | WEIGHT: 202.69 LBS

## 2022-06-06 VITALS
BODY MASS INDEX: 37.4 KG/M2 | WEIGHT: 203.25 LBS | OXYGEN SATURATION: 100 % | DIASTOLIC BLOOD PRESSURE: 112 MMHG | RESPIRATION RATE: 18 BRPM | TEMPERATURE: 99 F | HEIGHT: 62 IN | SYSTOLIC BLOOD PRESSURE: 150 MMHG | HEART RATE: 62 BPM

## 2022-06-06 DIAGNOSIS — Z01.818 PREOP EXAMINATION: ICD-10-CM

## 2022-06-06 DIAGNOSIS — I10 ESSENTIAL HYPERTENSION: ICD-10-CM

## 2022-06-06 DIAGNOSIS — T83.32XD MALPOSITIONED INTRAUTERINE DEVICE (IUD), SUBSEQUENT ENCOUNTER: Primary | ICD-10-CM

## 2022-06-06 DIAGNOSIS — Z01.818 PREOPERATIVE TESTING: Primary | ICD-10-CM

## 2022-06-06 DIAGNOSIS — T83.32XD MALPOSITIONED INTRAUTERINE DEVICE (IUD), SUBSEQUENT ENCOUNTER: ICD-10-CM

## 2022-06-06 LAB
ANION GAP SERPL CALC-SCNC: 8 MMOL/L (ref 8–16)
BASOPHILS # BLD AUTO: 0.03 K/UL (ref 0–0.2)
BASOPHILS NFR BLD: 0.5 % (ref 0–1.9)
BUN SERPL-MCNC: 6 MG/DL (ref 6–20)
CALCIUM SERPL-MCNC: 9.3 MG/DL (ref 8.7–10.5)
CHLORIDE SERPL-SCNC: 106 MMOL/L (ref 95–110)
CO2 SERPL-SCNC: 28 MMOL/L (ref 23–29)
CREAT SERPL-MCNC: 0.8 MG/DL (ref 0.5–1.4)
DIFFERENTIAL METHOD: ABNORMAL
EOSINOPHIL # BLD AUTO: 0 K/UL (ref 0–0.5)
EOSINOPHIL NFR BLD: 0.7 % (ref 0–8)
ERYTHROCYTE [DISTWIDTH] IN BLOOD BY AUTOMATED COUNT: 12.9 % (ref 11.5–14.5)
EST. GFR  (AFRICAN AMERICAN): >60 ML/MIN/1.73 M^2
EST. GFR  (NON AFRICAN AMERICAN): >60 ML/MIN/1.73 M^2
GLUCOSE SERPL-MCNC: 105 MG/DL (ref 70–110)
HCT VFR BLD AUTO: 34.4 % (ref 37–48.5)
HGB BLD-MCNC: 11.2 G/DL (ref 12–16)
IMM GRANULOCYTES # BLD AUTO: 0.01 K/UL (ref 0–0.04)
IMM GRANULOCYTES NFR BLD AUTO: 0.2 % (ref 0–0.5)
LYMPHOCYTES # BLD AUTO: 1.5 K/UL (ref 1–4.8)
LYMPHOCYTES NFR BLD: 24.5 % (ref 18–48)
MCH RBC QN AUTO: 26.5 PG (ref 27–31)
MCHC RBC AUTO-ENTMCNC: 32.6 G/DL (ref 32–36)
MCV RBC AUTO: 82 FL (ref 82–98)
MONOCYTES # BLD AUTO: 0.5 K/UL (ref 0.3–1)
MONOCYTES NFR BLD: 8.1 % (ref 4–15)
NEUTROPHILS # BLD AUTO: 4 K/UL (ref 1.8–7.7)
NEUTROPHILS NFR BLD: 66 % (ref 38–73)
NRBC BLD-RTO: 0 /100 WBC
PLATELET # BLD AUTO: 259 K/UL (ref 150–450)
PMV BLD AUTO: 10.6 FL (ref 9.2–12.9)
POTASSIUM SERPL-SCNC: 4.1 MMOL/L (ref 3.5–5.1)
RBC # BLD AUTO: 4.22 M/UL (ref 4–5.4)
SODIUM SERPL-SCNC: 142 MMOL/L (ref 136–145)
WBC # BLD AUTO: 6.05 K/UL (ref 3.9–12.7)

## 2022-06-06 PROCEDURE — 93010 ELECTROCARDIOGRAM REPORT: CPT | Mod: ,,, | Performed by: INTERNAL MEDICINE

## 2022-06-06 PROCEDURE — 99499 NO LOS: ICD-10-PCS | Mod: S$PBB,,, | Performed by: OBSTETRICS & GYNECOLOGY

## 2022-06-06 PROCEDURE — 1159F MED LIST DOCD IN RCRD: CPT | Mod: CPTII,,, | Performed by: OBSTETRICS & GYNECOLOGY

## 2022-06-06 PROCEDURE — 99212 OFFICE O/P EST SF 10 MIN: CPT | Mod: PBBFAC | Performed by: OBSTETRICS & GYNECOLOGY

## 2022-06-06 PROCEDURE — 3008F PR BODY MASS INDEX (BMI) DOCUMENTED: ICD-10-PCS | Mod: CPTII,,, | Performed by: OBSTETRICS & GYNECOLOGY

## 2022-06-06 PROCEDURE — 3008F BODY MASS INDEX DOCD: CPT | Mod: CPTII,,, | Performed by: OBSTETRICS & GYNECOLOGY

## 2022-06-06 PROCEDURE — 85025 COMPLETE CBC W/AUTO DIFF WBC: CPT | Performed by: OBSTETRICS & GYNECOLOGY

## 2022-06-06 PROCEDURE — 93010 EKG 12-LEAD: ICD-10-PCS | Mod: ,,, | Performed by: INTERNAL MEDICINE

## 2022-06-06 PROCEDURE — 99999 PR PBB SHADOW E&M-EST. PATIENT-LVL II: ICD-10-PCS | Mod: PBBFAC,,, | Performed by: OBSTETRICS & GYNECOLOGY

## 2022-06-06 PROCEDURE — 3077F SYST BP >= 140 MM HG: CPT | Mod: CPTII,,, | Performed by: OBSTETRICS & GYNECOLOGY

## 2022-06-06 PROCEDURE — 3080F PR MOST RECENT DIASTOLIC BLOOD PRESSURE >= 90 MM HG: ICD-10-PCS | Mod: CPTII,,, | Performed by: OBSTETRICS & GYNECOLOGY

## 2022-06-06 PROCEDURE — 99999 PR PBB SHADOW E&M-EST. PATIENT-LVL II: CPT | Mod: PBBFAC,,, | Performed by: OBSTETRICS & GYNECOLOGY

## 2022-06-06 PROCEDURE — 1159F PR MEDICATION LIST DOCUMENTED IN MEDICAL RECORD: ICD-10-PCS | Mod: CPTII,,, | Performed by: OBSTETRICS & GYNECOLOGY

## 2022-06-06 PROCEDURE — 93005 ELECTROCARDIOGRAM TRACING: CPT

## 2022-06-06 PROCEDURE — 99499 UNLISTED E&M SERVICE: CPT | Mod: S$PBB,,, | Performed by: OBSTETRICS & GYNECOLOGY

## 2022-06-06 PROCEDURE — 3077F PR MOST RECENT SYSTOLIC BLOOD PRESSURE >= 140 MM HG: ICD-10-PCS | Mod: CPTII,,, | Performed by: OBSTETRICS & GYNECOLOGY

## 2022-06-06 PROCEDURE — 80048 BASIC METABOLIC PNL TOTAL CA: CPT | Performed by: OBSTETRICS & GYNECOLOGY

## 2022-06-06 PROCEDURE — 3080F DIAST BP >= 90 MM HG: CPT | Mod: CPTII,,, | Performed by: OBSTETRICS & GYNECOLOGY

## 2022-06-06 RX ORDER — NIFEDIPINE 30 MG/1
30 TABLET, EXTENDED RELEASE ORAL DAILY
Qty: 30 TABLET | Refills: 0 | Status: SHIPPED | OUTPATIENT
Start: 2022-06-06 | End: 2022-08-14

## 2022-06-06 RX ORDER — HYDROCHLOROTHIAZIDE 25 MG/1
25 TABLET ORAL DAILY
Qty: 90 TABLET | Refills: 0 | Status: SHIPPED | OUTPATIENT
Start: 2022-06-06 | End: 2023-06-06

## 2022-06-06 NOTE — ANESTHESIA PREPROCEDURE EVALUATION
2022  Azucena Magana is a 37 y.o., female   To undergo Procedure(s) (LRB):  HYSTEROSCOPY, WITH DILATION AND CURETTAGE OF UTERUS (N/A)  LAPAROSCOPY, DIAGNOSTIC (N/A)  REMOVAL, INTRAUTERINE DEVICE (N/A)     Denies CP/SOB/GERD/MI/CVA/URI symptoms.  METS > 4  NPO > 8    Past Medical History:  Past Medical History:   Diagnosis Date    Abnormal Pap smear of cervix     Hypertension     Migraine headache     Obesity        Past Surgical History:  Past Surgical History:   Procedure Laterality Date     SECTION N/A 3/10/2021    Procedure:  SECTION;  Surgeon: Joi Najera MD;  Location: [facility] L&D OR;  Service: OB/GYN;  Laterality: N/A;     SECTION      VA TREAT ECTOPIC PREG,NON REMVAL Right 7/12/15    Right salpingectomy and partial cornual resection       Social History:  Social History     Socioeconomic History    Marital status: Single   Tobacco Use    Smoking status: Never Smoker    Smokeless tobacco: Never Used   Substance and Sexual Activity    Alcohol use: Yes     Comment: social    Drug use: No    Sexual activity: Yes     Partners: Male     Birth control/protection: I.U.D.       Medications:  Current Facility-Administered Medications on File Prior to Encounter   Medication Dose Route Frequency Provider Last Rate Last Admin    levonorgestreL (MIRENA) 20 mcg/24 hours (7 yrs) 52 mg IUD 1 Intra Uterine Device  1 each Intrauterine 1 time in Clinic/HOD Joi Najera MD         Current Outpatient Medications on File Prior to Encounter   Medication Sig Dispense Refill    buPROPion (WELLBUTRIN XL) 150 MG TB24 tablet Take 1 tablet (150 mg total) by mouth every morning. 30 tablet 5    ibuprofen (ADVIL,MOTRIN) 800 MG tablet Take 1 tablet (800 mg total) by mouth every 8 (eight) hours. 40 tablet 0    [DISCONTINUED] labetaloL (NORMODYNE) 200 MG tablet Take 1 tablet (200  mg total) by mouth every 12 (twelve) hours. 60 tablet 11       Allergies:  Review of patient's allergies indicates:  No Known Allergies    Active Problems:  Patient Active Problem List   Diagnosis    Ectopic pregnancy    Prenatal care, subsequent pregnancy    Hypertension complicating pregnancy    Obesity complicating pregnancy    Short interval between pregnancies affecting pregnancy, antepartum    S/P repeat low transverse     Morbid obesity    GBS (group B Streptococcus carrier), +RV culture, currently pregnant    Encounter for maternal care for low transverse scar from repeat  delivery    Pregnancy with history of  section, antepartum    Intrauterine device       Diagnostic Studies:   Latest Reference Range & Units 22 11:00   WBC 3.90 - 12.70 K/uL 6.05   RBC 4.00 - 5.40 M/uL 4.22   Hemoglobin 12.0 - 16.0 g/dL 11.2 (L)   Hematocrit 37.0 - 48.5 % 34.4 (L)   MCV 82 - 98 fL 82   MCH 27.0 - 31.0 pg 26.5 (L)   MCHC 32.0 - 36.0 g/dL 32.6   RDW 11.5 - 14.5 % 12.9   Platelets 150 - 450 K/uL 259   MPV 9.2 - 12.9 fL 10.6   Gran % 38.0 - 73.0 % 66.0   Lymph % 18.0 - 48.0 % 24.5   Mono % 4.0 - 15.0 % 8.1   Eosinophil % 0.0 - 8.0 % 0.7   Basophil % 0.0 - 1.9 % 0.5   Immature Granulocytes 0.0 - 0.5 % 0.2   Gran # (ANC) 1.8 - 7.7 K/uL 4.0   Lymph # 1.0 - 4.8 K/uL 1.5   Mono # 0.3 - 1.0 K/uL 0.5   Eos # 0.0 - 0.5 K/uL 0.0   Baso # 0.00 - 0.20 K/uL 0.03   Immature Grans (Abs) 0.00 - 0.04 K/uL 0.01   nRBC 0 /100 WBC 0   Differential Method  Automated      Latest Reference Range & Units 22 11:00   Sodium 136 - 145 mmol/L 142   Potassium 3.5 - 5.1 mmol/L 4.1   Chloride 95 - 110 mmol/L 106   CO2 23 - 29 mmol/L 28   Anion Gap 8 - 16 mmol/L 8   BUN 6 - 20 mg/dL 6   Creatinine 0.5 - 1.4 mg/dL 0.8   eGFR if non African American >60 mL/min/1.73 m^2 >60   eGFR if African American >60 mL/min/1.73 m^2 >60   Glucose 70 - 110 mg/dL 105   Calcium 8.7 - 10.5 mg/dL 9.3     EKG: (22):  Sinus  bradycardia    24 Hour Vitals:      See Nursing Charting For Additional Vitals    Pre-op Assessment    I have reviewed the Patient Summary Reports.     I have reviewed the Nursing Notes. I have reviewed the NPO Status.   I have reviewed the Medications.     Review of Systems  Anesthesia Hx:  No problems with previous Anesthesia  Denies Family Hx of Anesthesia complications.   Denies Personal Hx of Anesthesia complications.   Social:  Non-Smoker, Social Alcohol Use    Hematology/Oncology:     Oncology Normal    -- Anemia:   EENT/Dental:EENT/Dental Normal   Cardiovascular:   Exercise tolerance: good Hypertension  Functional Capacity good / => 4 METS    Pulmonary:  Pulmonary Normal    Renal/:  Renal/ Normal     Hepatic/GI:  Hepatic/GI Normal    Musculoskeletal:  Musculoskeletal Normal    OB/GYN/PEDS:  Malpositioned IUD   Neurological:   Headaches    Endocrine:  Endocrine Normal    Dermatological:  Skin Normal    Psych:  Psychiatric Normal           Physical Exam  General: Well nourished    Airway:  Mallampati: II   Mouth Opening: Normal  TM Distance: Normal      Dental:  Intact    Chest/Lungs:  Clear to auscultation    Heart:  Rate: Normal  Rhythm: Regular Rhythm        Anesthesia Plan  Type of Anesthesia, risks & benefits discussed:    Anesthesia Type: Gen ETT  Intra-op Monitoring Plan: Standard ASA Monitors  Post Op Pain Control Plan: multimodal analgesia and IV/PO Opioids PRN  Induction:  IV  Airway Plan: Direct and Video, Post-Induction  Informed Consent: Informed consent signed with the Patient and all parties understand the risks and agree with anesthesia plan.  All questions answered. Patient consented to blood products? Yes  ASA Score: 2  Anesthesia Plan Notes:   GA with OETT  Standard ASA monitors  Recovery in PACU  PONV: 3    Ready For Surgery From Anesthesia Perspective.     .

## 2022-06-06 NOTE — PROGRESS NOTES
DISCUSSION OF PREOPERATIVE MANAGEMENT OPTIONS AND ANTICIPATED TREATMENT OUTCOMES:    Azucena Magana is a 37 y.o. female , No LMP recorded. Patient has had an implant., who presents  presents today preoperatively for discussion of management options, projected postoperative recovery, and anticipated outcomes following her planned procedure. Alternatives to the procedure including possible medical therapy was reviewed.  The patient is scheduled for HYSTEROSCOPY D&C 2022 WITH POSS REPLACEMENT WITH LILLETTA, POSS LAPAROSCOPY IF IUD HAS MIGRATED THROUGH THE UTERINE WALL.    HISTORY AND PHYSICAL EXAMINATION DERIVED FORM PRIOR OFFICE VISITS REVIEWED:  Azucena Magana is a 36 y.o. female, , Patient's last menstrual period was 2022 (lmp unknown).,  presents for REMOVAL REPLACEMENT OF IUD - PROCEDURE DEFERRED BECAUSE PT TOOK PLAN B FOR UNPROTECTED INTERCOURSE WITHIN THE PAST 48 HOURS.  HAS NOT HAD SIGNIFICANT BLEEDING, BUT NOTED SIGNIFICANT BLEEDING AFTER USING PLAN B LAST MONTH.  DISCUSSED RISKS OF EARLY PREGNANCY TERMINATION AS WELL AS COEXISTING IUP AND INTRAUTERINE DEVICE.  WILL DEFER REMOVAL/REPLACEMENT X 1 MO, AND PT WILL USE CONDOMS, TAKE MONSERRAT.  BLOOD PRESSURE HIGH TODAY AND PATIENT COUNSELED REGARDING NEED TO SEE PCP FOR CLEARANCE TOMORROW.  HAS BEEN OUT OF HER ANTIHYPERTENSIVE AND DIURETIC, WILL REFILL NOW.  COUNSELED REGARDING POSSIBLE LAPAROSCOPIC REMOVAL OF IUD IFT IT APPEARS TO HAVE MIGRATED THROUGH THE UTERINE WALL.  COUNSELED REGARDING PLACEMENT OF LILETTA IF APPROPRIATE CONDITIONS AT TIME OF HYSTEROSCOPY.  IF LARGE FIBROID IMPINGES ON THE UTERINE CAVITY AND IUD REPLACEMENT IS NOT ADVISABLE, PATIENT'S 2ND CHOICE WOULD BE PLACEMENT OF NEXPLANON AT TIME OF HER POSTOP VISIT    2022:  FINDINGS:  The uterus measures 10.3 x 5.4 x 7.0 cm.  The endometrium is low lying.  It possibly tilt to the  scar.  The endometrial stripe measures 2.2 mm.  There is a fundal fibroid with a  peripheral calcified rim, measuring 3.4 x 3.4 x 3.2 cm.  It previously measured 2.6 x 2.3 x 2.4 cm.  The right ovary measures 3.1 x 1.4 x 2.0 cm. Vascular flow is seen in the right ovary.  The left ovary measures 2.8 x 1.5 x 1.8 cm. Vascular flow seen in the left ovary.  There is no free fluid in the pelvis.  Impression:  Low lying IUD.   Fundal fibroid.     3/2022:  . IUD IN PLACE SINCE 2021, COTEST SUBMITTED.  HAD RECENT EMERGENCY ROOM [facility] 22ND VISIT AND TOLD THAT IMAGING SHOWS IUD IS MALPOSITIONED IN LOWER UTERINE SEGMENT.  EVALUATION WAS DONE FOR RIGHT LOWER QUADRANT PAIN.  ON EXAM, STRING MINIMALLY LENGTHENED, STEM OF IUD NOT NOTED AT EXTERNAL OS.  PATIENT COUNSELED MAY NOT BE EFFECTIVE BIRTH CONTROL.  WOULD LIKE TO HAVE THIS IUD REMOVED IN AND ANOTHER ONE REPLACED, SO WILL ORDER REPLACEMENT AND HAVE HER RETURN WHEN APPROVED.  ALSO WOULD LIKE STD SCREEN FOR PEACE OF MIND.  DECLINED REMOVAL OF IUD TODAY, AND COUNSELED SHE MAY CONTINUE TO HAVE SOME CRAMPING AND IRREGULAR SPOTTING UNTIL ISSUE HAS BEEN RESOLVED  RECENT STRESS DUE TO CHANGE OF JOB FROM Parking PandaERAGE AND WATER TO POST OFFICE, BUT NOW CURRENTLY LAID OFF  2021:   5/18/2021 . She is not having any problems with bleeding, cramping, fever or discharge. She HASN'T TRIED to feel the strings, AND PARTNER WITH NO C/O.  HAS HAD SOME MILD IRREGULAR BLEEDING, AND RARE SHARP VAGINAL CRAMPING.  REASSURED EXAM IS AS EXPECTED, AND STRING LENGTH IS APPROPRIATE  She desires IUD for contraception - WILL ORDER AND RETURN FOR INSERTION 3-4 WEEKS.  She reports postpartum depression, IMPROVED WITH CURRENT MEDICATION .  WILL CONT LABETALOL BID (BP MILDLY ELEVATED TODAY) AND F/U PCP GUILLERMO AT [facility] FOR FURTHER BP MANAGEMENT.  RETURN TO WORK PLANNED June.  3/10/2021 rC/S GIRL  Her last pap was LESS THAN 3 YRS [facility] PER PT, DEFERS TODAY  3/2020  The patient presents today following rC/S 4 WEEKS AGO - HX ECTOPIC, R SALPINGECT W/CORNUAL RESECT,  CHR HTN.  BLOOD  "PRESSURE ELEVATED TODAY AND PATIENT IS VISIBLY UPSET.   PPD NOTED LAST VISIT AND WELLBUTRIN PRESCRIBED BUT PT DID NOT START - WAS WORRIED ABOUT RISKS OF SUICIDE, BUT DENIES SI.  DISCUSSED RISKS/BENEFITS AND SIDE EFFECTS WELLBUTRIN AND I THINK WORTHWHILE FOR HER TO TAKE - SAYS SHE WILL START.  HERE TODAY WITH  AND DISCUSSED LIBERALIZING ACTIVITIES, BECAUSE WALKING OUTSIDE TENDS TO MAKE HER FEEL BETTER.  SAYS SHE HAS NO SUPPORT, BUT HER MOTHER HAS BEEN HELPING WITH THE BABY AND IS HOME NOW, AND HUSB HAS COME TO ALL RECENT VISITS  LABETALOL 200 BID PRESCRIBED AT DISCHARGE, PT SAYS NOT TAKING (WAS NORMOTENSIVE AT PP CHECK 2 WEEKS AGO WHILE TAKING IT).  DISCUSSED RISKS AND ADVISED TO TAKE HER MEDS - WILL START NOW AND ANOTHER DOSE TONIGHT, CONT TID DOSING, AND F/U TOMORROW WITH BP CHECK.   "HX ECTOPIC, R SALPINGECT W/CORNUAL RESECT, THEN LTCS GIRL, 35W, IUGR,  CHR HTN SUPERIMP PRE-E, BASE P/CR TLTC  BMI 47.   [facility] , NL INTEGR.  ITS A GIRL, 37W 26TH%ILE- ,BREECH  PLAN rC/S  [id number].  - DECL BTL - 3/10 "          Past Medical History:   Diagnosis Date    Abnormal Pap smear of cervix      Hypertension      Migraine headache      Obesity                 Past Surgical History:   Procedure Laterality Date     SECTION N/A 3/10/2021     Procedure:  SECTION;  Surgeon: Joi Najera MD;  Location: [facility] L&D OR;  Service: OB/GYN;  Laterality: N/A;    FL TREAT ECTOPIC PREG,NON REMVAL Right 7/12/15     Right salpingectomy and partial cornual resection         MEDICATIONS AND ALLERGIES:        Current Outpatient Medications:     hydroCHLOROthiazide (HYDRODIURIL) 25 MG tablet, Take 1 tablet (25 mg total) by mouth once daily., Disp: 90 tablet, Rfl: 3    buPROPion (WELLBUTRIN XL) 150 MG TB24 tablet, Take 1 tablet (150 mg total) by mouth every morning., Disp: 30 tablet, Rfl: 5    diclofenac sodium (VOLTAREN) 1 % Gel, Apply 2 g topically 4 (four) times daily. for 10 days, Disp: 100 " g, Rfl: 0    HYDROcodone-acetaminophen (NORCO) 5-325 mg per tablet, Take 1 tablet by mouth every 6 (six) hours as needed for Pain. (Patient not taking: No sig reported), Disp: 30 tablet, Rfl: 0    ibuprofen (ADVIL,MOTRIN) 800 MG tablet, Take 1 tablet (800 mg total) by mouth every 8 (eight) hours. (Patient not taking: No sig reported), Disp: 40 tablet, Rfl: 0    naproxen (NAPROSYN) 375 MG tablet, Take 1 tablet (375 mg total) by mouth 2 (two) times daily with meals. (Patient not taking: No sig reported), Disp: 60 tablet, Rfl: 0    NIFEdipine (PROCARDIA-XL) 30 MG (OSM) 24 hr tablet, Take 1 tablet (30 mg total) by mouth once daily., Disp: 30 tablet, Rfl: 0    norgestimate-ethinyl estradioL (ORTHO-CYCLEN) 0.25-35 mg-mcg per tablet, Take 1 tablet by mouth once daily., Disp: 28 tablet, Rfl: 1     Review of patient's allergies indicates:  No Known Allergies           Family History   Problem Relation Age of Onset    Ovarian cancer Maternal Grandmother      Colon cancer Neg Hx      Breast cancer Neg Hx           Social History               Socioeconomic History    Marital status: Single   Tobacco Use    Smoking status: Never Smoker    Smokeless tobacco: Never Used   Substance and Sexual Activity    Alcohol use: No    Drug use: No    Sexual activity: Yes       Partners: Male       Birth control/protection: I.U.D.            COMPREHENSIVE GYN HISTORY:  PAP History: Denies abnormal Paps.  Infection History: Denies STDs. Denies PID.  Benign History: Denies uterine fibroids. Denies ovarian cysts. Denies endometriosis. Denies other conditions.  Cancer History: Denies cervical cancer. Denies uterine cancer or hyperplasia. Denies ovarian cancer. Denies vulvar cancer or pre-cancer. Denies vaginal cancer or pre-cancer. Denies breast cancer. Denies colon cancer.     ROS:  GENERAL: No weight changes. No swelling. No fatigue. No fever.  CARDIOVASCULAR: No chest pain. No shortness of breath. No leg cramps.   NEUROLOGICAL:  No headaches. No vision changes.  BREASTS: No pain. No lumps. No discharge.  ABDOMEN: No pain. No nausea. No vomiting. No diarrhea. No constipation.  REPRODUCTIVE: No abnormal bleeding.   VULVA: No pain. No lesions. No itching.  VAGINA: No relaxation. No itching. No odor. No discharge. No lesions.  URINARY: No incontinence. No nocturia. No frequency. No dysuria.    BP (!) 182/116   Wt 96.9 kg (213 lb 10 oz)   LMP 03/31/2022 (LMP Unknown) Comment: pt unsure  BMI 39.07 kg/m²      PE:  APPEARANCE: Well nourished, well developed, in no acute distress.  AFFECT: WNL, alert and oriented x 3.  SKIN: No acne or hirsutism.  NECK: Neck symmetric, without masses or thyromegaly.  NODES: No inguinal, cervical, axillary or femoral lymph node enlargement.  CHEST: Good respiratory effort.   ABDOMEN: Soft. No tenderness or masses. No hepatosplenomegaly. No hernias.  BREASTS: Symmetrical, no skin changes, visible lesions, palpable masses or nipple discharge bilaterally.  PELVIC: External female genitalia without lesions.  Female hair distribution. Adequate perineal body, Normal urethral meatus. Vagina moist and well rugated without lesions or discharge.  No significant cystocele or rectocele present. Cervix pink without lesions, discharge or tenderness. Uterus is normal size, regular, mobile and nontender. Adnexa without masses or tenderness.  EXTREMITIES: No edema    A procedure-specific consent form was reviewed with the patient and signed.  The patient seemed to understand the procedure and it potential complications.  She did not have further questions at the end of the visit.      PLAN:  Proceed with the scheduled procedure as planned.    FOLLOW-UP:  After hospitalization.  A postoperative visit 2-3 weeks following the procedure was advised and scheduled.

## 2022-06-06 NOTE — H&P (VIEW-ONLY)
DISCUSSION OF PREOPERATIVE MANAGEMENT OPTIONS AND ANTICIPATED TREATMENT OUTCOMES:    Azucena Magana is a 37 y.o. female , No LMP recorded. Patient has had an implant., who presents  presents today preoperatively for discussion of management options, projected postoperative recovery, and anticipated outcomes following her planned procedure. Alternatives to the procedure including possible medical therapy was reviewed.  The patient is scheduled for HYSTEROSCOPY D&C 2022 WITH POSS REPLACEMENT WITH LILLETTA, POSS LAPAROSCOPY IF IUD HAS MIGRATED THROUGH THE UTERINE WALL.    HISTORY AND PHYSICAL EXAMINATION DERIVED FORM PRIOR OFFICE VISITS REVIEWED:  Azucena Magana is a 36 y.o. female, , Patient's last menstrual period was 2022 (lmp unknown).,  presents for REMOVAL REPLACEMENT OF IUD - PROCEDURE DEFERRED BECAUSE PT TOOK PLAN B FOR UNPROTECTED INTERCOURSE WITHIN THE PAST 48 HOURS.  HAS NOT HAD SIGNIFICANT BLEEDING, BUT NOTED SIGNIFICANT BLEEDING AFTER USING PLAN B LAST MONTH.  DISCUSSED RISKS OF EARLY PREGNANCY TERMINATION AS WELL AS COEXISTING IUP AND INTRAUTERINE DEVICE.  WILL DEFER REMOVAL/REPLACEMENT X 1 MO, AND PT WILL USE CONDOMS, TAKE MONSERRAT.  BLOOD PRESSURE HIGH TODAY AND PATIENT COUNSELED REGARDING NEED TO SEE PCP FOR CLEARANCE TOMORROW.  HAS BEEN OUT OF HER ANTIHYPERTENSIVE AND DIURETIC, WILL REFILL NOW.  COUNSELED REGARDING POSSIBLE LAPAROSCOPIC REMOVAL OF IUD IFT IT APPEARS TO HAVE MIGRATED THROUGH THE UTERINE WALL.  COUNSELED REGARDING PLACEMENT OF LILETTA IF APPROPRIATE CONDITIONS AT TIME OF HYSTEROSCOPY.  IF LARGE FIBROID IMPINGES ON THE UTERINE CAVITY AND IUD REPLACEMENT IS NOT ADVISABLE, PATIENT'S 2ND CHOICE WOULD BE PLACEMENT OF NEXPLANON AT TIME OF HER POSTOP VISIT    2022:  FINDINGS:  The uterus measures 10.3 x 5.4 x 7.0 cm.  The endometrium is low lying.  It possibly tilt to the  scar.  The endometrial stripe measures 2.2 mm.  There is a fundal fibroid with a  peripheral calcified rim, measuring 3.4 x 3.4 x 3.2 cm.  It previously measured 2.6 x 2.3 x 2.4 cm.  The right ovary measures 3.1 x 1.4 x 2.0 cm. Vascular flow is seen in the right ovary.  The left ovary measures 2.8 x 1.5 x 1.8 cm. Vascular flow seen in the left ovary.  There is no free fluid in the pelvis.  Impression:  Low lying IUD.   Fundal fibroid.     3/2022:  . IUD IN PLACE SINCE 2021, COTEST SUBMITTED.  HAD RECENT EMERGENCY ROOM [facility] 22ND VISIT AND TOLD THAT IMAGING SHOWS IUD IS MALPOSITIONED IN LOWER UTERINE SEGMENT.  EVALUATION WAS DONE FOR RIGHT LOWER QUADRANT PAIN.  ON EXAM, STRING MINIMALLY LENGTHENED, STEM OF IUD NOT NOTED AT EXTERNAL OS.  PATIENT COUNSELED MAY NOT BE EFFECTIVE BIRTH CONTROL.  WOULD LIKE TO HAVE THIS IUD REMOVED IN AND ANOTHER ONE REPLACED, SO WILL ORDER REPLACEMENT AND HAVE HER RETURN WHEN APPROVED.  ALSO WOULD LIKE STD SCREEN FOR PEACE OF MIND.  DECLINED REMOVAL OF IUD TODAY, AND COUNSELED SHE MAY CONTINUE TO HAVE SOME CRAMPING AND IRREGULAR SPOTTING UNTIL ISSUE HAS BEEN RESOLVED  RECENT STRESS DUE TO CHANGE OF JOB FROM [employer] TO POST OFFICE, BUT NOW CURRENTLY LAID OFF  2021:   5/18/2021 . She is not having any problems with bleeding, cramping, fever or discharge. She HASN'T TRIED to feel the strings, AND PARTNER WITH NO C/O.  HAS HAD SOME MILD IRREGULAR BLEEDING, AND RARE SHARP VAGINAL CRAMPING.  REASSURED EXAM IS AS EXPECTED, AND STRING LENGTH IS APPROPRIATE  She desires IUD for contraception - WILL ORDER AND RETURN FOR INSERTION 3-4 WEEKS.  She reports postpartum depression, IMPROVED WITH CURRENT MEDICATION .  WILL CONT LABETALOL BID (BP MILDLY ELEVATED TODAY) AND F/U PCP GUILLERMO AT [facility] FOR FURTHER BP MANAGEMENT.  RETURN TO WORK PLANNED June.  3/10/2021 rC/S GIRL  Her last pap was LESS THAN 3 YRS [facility] PER PT, DEFERS TODAY  3/2020  The patient presents today following rC/S 4 WEEKS AGO - HX ECTOPIC, R SALPINGECT W/CORNUAL RESECT,  CHR HTN.  BLOOD  "PRESSURE ELEVATED TODAY AND PATIENT IS VISIBLY UPSET.   PPD NOTED LAST VISIT AND WELLBUTRIN PRESCRIBED BUT PT DID NOT START - WAS WORRIED ABOUT RISKS OF SUICIDE, BUT DENIES SI.  DISCUSSED RISKS/BENEFITS AND SIDE EFFECTS WELLBUTRIN AND I THINK WORTHWHILE FOR HER TO TAKE - SAYS SHE WILL START.  HERE TODAY WITH  AND DISCUSSED LIBERALIZING ACTIVITIES, BECAUSE WALKING OUTSIDE TENDS TO MAKE HER FEEL BETTER.  SAYS SHE HAS NO SUPPORT, BUT HER MOTHER HAS BEEN HELPING WITH THE BABY AND IS HOME NOW, AND HUSB HAS COME TO ALL RECENT VISITS  LABETALOL 200 BID PRESCRIBED AT DISCHARGE, PT SAYS NOT TAKING (WAS NORMOTENSIVE AT PP CHECK 2 WEEKS AGO WHILE TAKING IT).  DISCUSSED RISKS AND ADVISED TO TAKE HER MEDS - WILL START NOW AND ANOTHER DOSE TONIGHT, CONT TID DOSING, AND F/U TOMORROW WITH BP CHECK.   "HX ECTOPIC, R SALPINGECT W/CORNUAL RESECT, THEN LTCS GIRL, 35W, IUGR,  CHR HTN SUPERIMP PRE-E, BASE P/CR TLTC  BMI 47.   [facility]   [facility]. , NL INTEGR.  ITS A GIRL, 37W 26TH%ILE- ,BREECH  PLAN rC/S  [id number].  - DECL BTL - 3/10 "          Past Medical History:   Diagnosis Date    Abnormal Pap smear of cervix      Hypertension      Migraine headache      Obesity                 Past Surgical History:   Procedure Laterality Date     SECTION N/A 3/10/2021     Procedure:  SECTION;  Surgeon: Joi Najera MD;  Location: [facility] L&D OR;  Service: OB/GYN;  Laterality: N/A;    MO TREAT ECTOPIC PREG,NON REMVAL Right 7/12/15     Right salpingectomy and partial cornual resection         MEDICATIONS AND ALLERGIES:        Current Outpatient Medications:     hydroCHLOROthiazide (HYDRODIURIL) 25 MG tablet, Take 1 tablet (25 mg total) by mouth once daily., Disp: 90 tablet, Rfl: 3    buPROPion (WELLBUTRIN XL) 150 MG TB24 tablet, Take 1 tablet (150 mg total) by mouth every morning., Disp: 30 tablet, Rfl: 5    diclofenac sodium (VOLTAREN) 1 % Gel, Apply 2 g topically 4 (four) times daily. for 10 days, Disp: 100 " g, Rfl: 0    HYDROcodone-acetaminophen (NORCO) 5-325 mg per tablet, Take 1 tablet by mouth every 6 (six) hours as needed for Pain. (Patient not taking: No sig reported), Disp: 30 tablet, Rfl: 0    ibuprofen (ADVIL,MOTRIN) 800 MG tablet, Take 1 tablet (800 mg total) by mouth every 8 (eight) hours. (Patient not taking: No sig reported), Disp: 40 tablet, Rfl: 0    naproxen (NAPROSYN) 375 MG tablet, Take 1 tablet (375 mg total) by mouth 2 (two) times daily with meals. (Patient not taking: No sig reported), Disp: 60 tablet, Rfl: 0    NIFEdipine (PROCARDIA-XL) 30 MG (OSM) 24 hr tablet, Take 1 tablet (30 mg total) by mouth once daily., Disp: 30 tablet, Rfl: 0    norgestimate-ethinyl estradioL (ORTHO-CYCLEN) 0.25-35 mg-mcg per tablet, Take 1 tablet by mouth once daily., Disp: 28 tablet, Rfl: 1     Review of patient's allergies indicates:  No Known Allergies           Family History   Problem Relation Age of Onset    Ovarian cancer Maternal Grandmother      Colon cancer Neg Hx      Breast cancer Neg Hx           Social History               Socioeconomic History    Marital status: Single   Tobacco Use    Smoking status: Never Smoker    Smokeless tobacco: Never Used   Substance and Sexual Activity    Alcohol use: No    Drug use: No    Sexual activity: Yes       Partners: Male       Birth control/protection: I.U.D.            COMPREHENSIVE GYN HISTORY:  PAP History: Denies abnormal Paps.  Infection History: Denies STDs. Denies PID.  Benign History: Denies uterine fibroids. Denies ovarian cysts. Denies endometriosis. Denies other conditions.  Cancer History: Denies cervical cancer. Denies uterine cancer or hyperplasia. Denies ovarian cancer. Denies vulvar cancer or pre-cancer. Denies vaginal cancer or pre-cancer. Denies breast cancer. Denies colon cancer.     ROS:  GENERAL: No weight changes. No swelling. No fatigue. No fever.  CARDIOVASCULAR: No chest pain. No shortness of breath. No leg cramps.   NEUROLOGICAL:  No headaches. No vision changes.  BREASTS: No pain. No lumps. No discharge.  ABDOMEN: No pain. No nausea. No vomiting. No diarrhea. No constipation.  REPRODUCTIVE: No abnormal bleeding.   VULVA: No pain. No lesions. No itching.  VAGINA: No relaxation. No itching. No odor. No discharge. No lesions.  URINARY: No incontinence. No nocturia. No frequency. No dysuria.    BP (!) 182/116   Wt 96.9 kg (213 lb 10 oz)   LMP 03/31/2022 (LMP Unknown) Comment: pt unsure  BMI 39.07 kg/m²      PE:  APPEARANCE: Well nourished, well developed, in no acute distress.  AFFECT: WNL, alert and oriented x 3.  SKIN: No acne or hirsutism.  NECK: Neck symmetric, without masses or thyromegaly.  NODES: No inguinal, cervical, axillary or femoral lymph node enlargement.  CHEST: Good respiratory effort.   ABDOMEN: Soft. No tenderness or masses. No hepatosplenomegaly. No hernias.  BREASTS: Symmetrical, no skin changes, visible lesions, palpable masses or nipple discharge bilaterally.  PELVIC: External female genitalia without lesions.  Female hair distribution. Adequate perineal body, Normal urethral meatus. Vagina moist and well rugated without lesions or discharge.  No significant cystocele or rectocele present. Cervix pink without lesions, discharge or tenderness. Uterus is normal size, regular, mobile and nontender. Adnexa without masses or tenderness.  EXTREMITIES: No edema    A procedure-specific consent form was reviewed with the patient and signed.  The patient seemed to understand the procedure and it potential complications.  She did not have further questions at the end of the visit.      PLAN:  Proceed with the scheduled procedure as planned.    FOLLOW-UP:  After hospitalization.  A postoperative visit 2-3 weeks following the procedure was advised and scheduled.

## 2022-06-06 NOTE — PLAN OF CARE
Pre-operative instructions, medication directives and pain scales reviewed with patient. All questions the patient had  were answered. Re-assurance about surgical procedure and day of surgery routine given as needed. The patient verbalized understanding of the pre-op instructions.The pt is to see PCP 6/7 at 840 for elevated BP. Dr Najera notified of Elevated /106 and 150/112.

## 2022-06-06 NOTE — DISCHARGE INSTRUCTIONS
Before 7 AM, enter through the Emergency Entrance..   After 7 AM enter through the Main Entrance.      Your procedure  is scheduled for _Thursday 6/16_________.    Call [phone] between 2pm and 5pm on _WED 6/15______to find out your arrival time for the day of surgery.    You may use the main entrance to the hospital on the [facility] side, or the entrance that is next to the [facility].    You may have two visitors.  Visiting hours for non-COVID-19 patients expanded to 24/7 (still restricted to one visitor)  Youth visitation changed from age 18 to age 12.      You will be going to the Same Day Surgery Unit on the 2nd floor of the hospital.    Important instructions:  Do not eat anything after midnight.  You may have plain water, non carbonated.  You may also have Gatorade or Powerade after midnight.    Stop all fluids 2 hours before your surgery.    It is okay to brush your teeth.  Do not have gum, candy or mints.    SEE MEDICATION SHEET.   TAKE MEDICATIONS AS DIRECTED WITH SIPS OF WATER.        STOP taking Aspirin, Ibuprofen,  Advil, Motrin, Mobic(meloxicam), Aleve (naproxen), Fish oil, and Vitamin E for at least 7 days before your surgery.     You may take Tylenol if needed which is not a blood thinner.    Please shower the night before and the morning of your surgery.      Follow any Prep Instructions given by your surgeon.      Use Hibiclens soap as instructed by your pre op nurse.   Please place clean linens on your bed the night before surgery. Please wear fresh clean clothing after each shower.    No shaving of procedural area at least 4-5 days before surgery due to increased risk of skin irritation and/or possible infection.    Female patients may be asked for a urine specimen on the morning of the surgery.  Please check with your nurse before using the restroom.  Contact lenses and removable denture work may not be worn during your procedure.    You may wear deodorant only.    Do not wear  powder, body lotion, perfume/cologne or make-up.    Do not wear any jewelry or have any metal on your body.    You will be asked to remove any dentures or partials for the procedure.    If you are going home on the same day of surgery, you must arrange for a family member or a friend to drive you home.  Public transportation is prohibited.  You will not be able to drive home if you were given anesthesia or sedation.    Patients who want to have their Post-op prescriptions filled from our in-house [facility], bring a Credit/Debit Card  or cash with you. A co-pay may be required.  The pharmacy closes at 5:30 pm.        Wear loose fitting clothes allowing for bandages.    Please leave money and valuables home.      You may bring your cell phone.    Call the doctor if fever or illness should occur before your surgery.    Call [phone] to contact us here if needed.

## 2022-06-15 ENCOUNTER — HOSPITAL ENCOUNTER (OUTPATIENT)
Dept: PREADMISSION TESTING | Facility: HOSPITAL | Age: 37
Discharge: HOME | End: 2022-06-15
Attending: INTERNAL MEDICINE
Payer: MEDICAID

## 2022-06-15 DIAGNOSIS — Z11.52 ENCOUNTER FOR PREOPERATIVE SCREENING LABORATORY TESTING FOR COVID-19 VIRUS: ICD-10-CM

## 2022-06-15 DIAGNOSIS — T83.32XD MALPOSITIONED INTRAUTERINE DEVICE (IUD), SUBSEQUENT ENCOUNTER: ICD-10-CM

## 2022-06-15 DIAGNOSIS — Z01.812 ENCOUNTER FOR PREOPERATIVE SCREENING LABORATORY TESTING FOR COVID-19 VIRUS: ICD-10-CM

## 2022-06-15 LAB
ABO + RH BLD: NORMAL
BLD GP AB SCN CELLS X3 SERPL QL: NORMAL
HCG INTACT+B SERPL-ACNC: <1.2 MIU/ML
SARS-COV-2 RDRP RESP QL NAA+PROBE: NEGATIVE

## 2022-06-15 PROCEDURE — 84702 CHORIONIC GONADOTROPIN TEST: CPT | Performed by: OBSTETRICS & GYNECOLOGY

## 2022-06-15 PROCEDURE — 36415 COLL VENOUS BLD VENIPUNCTURE: CPT | Performed by: OBSTETRICS & GYNECOLOGY

## 2022-06-15 PROCEDURE — 86901 BLOOD TYPING SEROLOGIC RH(D): CPT | Performed by: OBSTETRICS & GYNECOLOGY

## 2022-06-15 PROCEDURE — U0002 COVID-19 LAB TEST NON-CDC: HCPCS | Performed by: OBSTETRICS & GYNECOLOGY

## 2022-06-15 NOTE — PRE-PROCEDURE INSTRUCTIONS
Patient here for COVID swab  BP today 137/89  Patient followed up with PCP and BP meds resumed  Instructed patient to take Procardia am of surgery

## 2022-06-16 ENCOUNTER — ANESTHESIA (OUTPATIENT)
Dept: SURGERY | Facility: HOSPITAL | Age: 37
End: 2022-06-16
Payer: MEDICAID

## 2022-06-16 ENCOUNTER — HOSPITAL ENCOUNTER (OUTPATIENT)
Facility: HOSPITAL | Age: 37
Discharge: HOME | End: 2022-06-16
Attending: OBSTETRICS & GYNECOLOGY | Admitting: OBSTETRICS & GYNECOLOGY
Payer: MEDICAID

## 2022-06-16 VITALS
RESPIRATION RATE: 16 BRPM | BODY MASS INDEX: 37.07 KG/M2 | TEMPERATURE: 98 F | DIASTOLIC BLOOD PRESSURE: 88 MMHG | HEART RATE: 62 BPM | SYSTOLIC BLOOD PRESSURE: 140 MMHG | WEIGHT: 202.69 LBS | OXYGEN SATURATION: 100 %

## 2022-06-16 DIAGNOSIS — Z01.812 ENCOUNTER FOR PREOPERATIVE SCREENING LABORATORY TESTING FOR COVID-19 VIRUS: ICD-10-CM

## 2022-06-16 DIAGNOSIS — Z97.5 INTRAUTERINE DEVICE: ICD-10-CM

## 2022-06-16 DIAGNOSIS — Z11.52 ENCOUNTER FOR PREOPERATIVE SCREENING LABORATORY TESTING FOR COVID-19 VIRUS: ICD-10-CM

## 2022-06-16 DIAGNOSIS — Z98.890 S/P LAPAROSCOPY: Primary | ICD-10-CM

## 2022-06-16 DIAGNOSIS — Z98.891 S/P REPEAT LOW TRANSVERSE C-SECTION: ICD-10-CM

## 2022-06-16 DIAGNOSIS — T83.32XD MALPOSITIONED INTRAUTERINE DEVICE (IUD), SUBSEQUENT ENCOUNTER: ICD-10-CM

## 2022-06-16 DIAGNOSIS — T83.32XA MALPOSITIONED INTRAUTERINE DEVICE: ICD-10-CM

## 2022-06-16 LAB — POCT GLUCOSE: 93 MG/DL (ref 70–110)

## 2022-06-16 PROCEDURE — D9220A PRA ANESTHESIA: ICD-10-PCS | Mod: CRNA,,, | Performed by: NURSE ANESTHETIST, CERTIFIED REGISTERED

## 2022-06-16 PROCEDURE — 49320 PR LAP,DIAGNOSTIC ABDOMEN: ICD-10-PCS | Mod: 80,,, | Performed by: OBSTETRICS & GYNECOLOGY

## 2022-06-16 PROCEDURE — 63600175 PHARM REV CODE 636 W HCPCS: Performed by: OBSTETRICS & GYNECOLOGY

## 2022-06-16 PROCEDURE — 58555 PR HYSTEROSCOPY,DX,SEP PROC: ICD-10-PCS | Mod: 51,,, | Performed by: OBSTETRICS & GYNECOLOGY

## 2022-06-16 PROCEDURE — 49320 DIAG LAPARO SEPARATE PROC: CPT | Mod: 59,,, | Performed by: OBSTETRICS & GYNECOLOGY

## 2022-06-16 PROCEDURE — 71000039 HC RECOVERY, EACH ADD'L HOUR: Performed by: OBSTETRICS & GYNECOLOGY

## 2022-06-16 PROCEDURE — D9220A PRA ANESTHESIA: Mod: CRNA,,, | Performed by: NURSE ANESTHETIST, CERTIFIED REGISTERED

## 2022-06-16 PROCEDURE — 71000033 HC RECOVERY, INTIAL HOUR: Performed by: OBSTETRICS & GYNECOLOGY

## 2022-06-16 PROCEDURE — 25000003 PHARM REV CODE 250: Performed by: ANESTHESIOLOGY

## 2022-06-16 PROCEDURE — 63600175 PHARM REV CODE 636 W HCPCS: Performed by: NURSE ANESTHETIST, CERTIFIED REGISTERED

## 2022-06-16 PROCEDURE — D9220A PRA ANESTHESIA: Mod: ANES,,, | Performed by: ANESTHESIOLOGY

## 2022-06-16 PROCEDURE — 49320 PR LAP,DIAGNOSTIC ABDOMEN: ICD-10-PCS | Mod: 59,,, | Performed by: OBSTETRICS & GYNECOLOGY

## 2022-06-16 PROCEDURE — 37000008 HC ANESTHESIA 1ST 15 MINUTES: Performed by: OBSTETRICS & GYNECOLOGY

## 2022-06-16 PROCEDURE — 71000015 HC POSTOP RECOV 1ST HR: Performed by: OBSTETRICS & GYNECOLOGY

## 2022-06-16 PROCEDURE — 63600175 PHARM REV CODE 636 W HCPCS: Performed by: ANESTHESIOLOGY

## 2022-06-16 PROCEDURE — 71000016 HC POSTOP RECOV ADDL HR: Performed by: OBSTETRICS & GYNECOLOGY

## 2022-06-16 PROCEDURE — 00840 ANES IPER PX LOWER ABD NOS: CPT | Performed by: OBSTETRICS & GYNECOLOGY

## 2022-06-16 PROCEDURE — 58555 HYSTEROSCOPY DX SEP PROC: CPT | Mod: 51,,, | Performed by: OBSTETRICS & GYNECOLOGY

## 2022-06-16 PROCEDURE — D9220A PRA ANESTHESIA: ICD-10-PCS | Mod: ANES,,, | Performed by: ANESTHESIOLOGY

## 2022-06-16 PROCEDURE — 36000709 HC OR TIME LEV III EA ADD 15 MIN: Performed by: OBSTETRICS & GYNECOLOGY

## 2022-06-16 PROCEDURE — 36000708 HC OR TIME LEV III 1ST 15 MIN: Performed by: OBSTETRICS & GYNECOLOGY

## 2022-06-16 PROCEDURE — 25000003 PHARM REV CODE 250: Performed by: OBSTETRICS & GYNECOLOGY

## 2022-06-16 PROCEDURE — 37000009 HC ANESTHESIA EA ADD 15 MINS: Performed by: OBSTETRICS & GYNECOLOGY

## 2022-06-16 PROCEDURE — 25000003 PHARM REV CODE 250: Performed by: NURSE ANESTHETIST, CERTIFIED REGISTERED

## 2022-06-16 PROCEDURE — 49320 DIAG LAPARO SEPARATE PROC: CPT | Mod: 80,,, | Performed by: OBSTETRICS & GYNECOLOGY

## 2022-06-16 PROCEDURE — 27201423 OPTIME MED/SURG SUP & DEVICES STERILE SUPPLY: Performed by: OBSTETRICS & GYNECOLOGY

## 2022-06-16 RX ORDER — HYDROMORPHONE HYDROCHLORIDE 2 MG/ML
0.2 INJECTION, SOLUTION INTRAMUSCULAR; INTRAVENOUS; SUBCUTANEOUS EVERY 5 MIN PRN
Status: DISCONTINUED | OUTPATIENT
Start: 2022-06-16 | End: 2022-06-16 | Stop reason: HOSPADM

## 2022-06-16 RX ORDER — DIPHENHYDRAMINE HYDROCHLORIDE 50 MG/ML
25 INJECTION INTRAMUSCULAR; INTRAVENOUS EVERY 4 HOURS PRN
Status: DISCONTINUED | OUTPATIENT
Start: 2022-06-16 | End: 2022-06-16 | Stop reason: HOSPADM

## 2022-06-16 RX ORDER — MUPIROCIN 20 MG/G
OINTMENT TOPICAL
Status: DISCONTINUED | OUTPATIENT
Start: 2022-06-16 | End: 2022-06-16 | Stop reason: HOSPADM

## 2022-06-16 RX ORDER — LIDOCAINE HYDROCHLORIDE 10 MG/ML
1 INJECTION, SOLUTION EPIDURAL; INFILTRATION; INTRACAUDAL; PERINEURAL ONCE
Status: DISCONTINUED | OUTPATIENT
Start: 2022-06-16 | End: 2022-06-16 | Stop reason: HOSPADM

## 2022-06-16 RX ORDER — LIDOCAINE HYDROCHLORIDE 20 MG/ML
INJECTION INTRAVENOUS
Status: DISCONTINUED | OUTPATIENT
Start: 2022-06-16 | End: 2022-06-16

## 2022-06-16 RX ORDER — ONDANSETRON 4 MG/1
8 TABLET, ORALLY DISINTEGRATING ORAL EVERY 8 HOURS PRN
Status: DISCONTINUED | OUTPATIENT
Start: 2022-06-16 | End: 2022-06-16 | Stop reason: HOSPADM

## 2022-06-16 RX ORDER — DEXAMETHASONE SODIUM PHOSPHATE 4 MG/ML
INJECTION, SOLUTION INTRA-ARTICULAR; INTRALESIONAL; INTRAMUSCULAR; INTRAVENOUS; SOFT TISSUE
Status: DISCONTINUED | OUTPATIENT
Start: 2022-06-16 | End: 2022-06-16

## 2022-06-16 RX ORDER — OXYCODONE AND ACETAMINOPHEN 5; 325 MG/1; MG/1
1 TABLET ORAL EVERY 4 HOURS PRN
Qty: 20 TABLET | Refills: 0 | Status: SHIPPED | OUTPATIENT
Start: 2022-06-16 | End: 2022-06-16

## 2022-06-16 RX ORDER — KETAMINE HYDROCHLORIDE 100 MG/ML
INJECTION, SOLUTION INTRAMUSCULAR; INTRAVENOUS
Status: DISCONTINUED | OUTPATIENT
Start: 2022-06-16 | End: 2022-06-16

## 2022-06-16 RX ORDER — HALOPERIDOL 5 MG/ML
0.5 INJECTION INTRAMUSCULAR EVERY 10 MIN PRN
Status: DISCONTINUED | OUTPATIENT
Start: 2022-06-16 | End: 2022-06-16 | Stop reason: HOSPADM

## 2022-06-16 RX ORDER — SUCCINYLCHOLINE CHLORIDE 20 MG/ML
INJECTION INTRAMUSCULAR; INTRAVENOUS
Status: DISCONTINUED | OUTPATIENT
Start: 2022-06-16 | End: 2022-06-16

## 2022-06-16 RX ORDER — BUPIVACAINE HYDROCHLORIDE 2.5 MG/ML
INJECTION, SOLUTION EPIDURAL; INFILTRATION; INTRACAUDAL
Status: DISCONTINUED | OUTPATIENT
Start: 2022-06-16 | End: 2022-06-16 | Stop reason: HOSPADM

## 2022-06-16 RX ORDER — SODIUM CHLORIDE, SODIUM LACTATE, POTASSIUM CHLORIDE, CALCIUM CHLORIDE 600; 310; 30; 20 MG/100ML; MG/100ML; MG/100ML; MG/100ML
INJECTION, SOLUTION INTRAVENOUS CONTINUOUS
Status: DISCONTINUED | OUTPATIENT
Start: 2022-06-16 | End: 2022-06-16 | Stop reason: HOSPADM

## 2022-06-16 RX ORDER — DIPHENHYDRAMINE HCL 25 MG
25 CAPSULE ORAL EVERY 4 HOURS PRN
Status: DISCONTINUED | OUTPATIENT
Start: 2022-06-16 | End: 2022-06-16 | Stop reason: HOSPADM

## 2022-06-16 RX ORDER — SODIUM CHLORIDE 0.9 % (FLUSH) 0.9 %
10 SYRINGE (ML) INJECTION
Status: DISCONTINUED | OUTPATIENT
Start: 2022-06-16 | End: 2022-06-16 | Stop reason: HOSPADM

## 2022-06-16 RX ORDER — ROCURONIUM BROMIDE 10 MG/ML
INJECTION, SOLUTION INTRAVENOUS
Status: DISCONTINUED | OUTPATIENT
Start: 2022-06-16 | End: 2022-06-16

## 2022-06-16 RX ORDER — OXYCODONE AND ACETAMINOPHEN 5; 325 MG/1; MG/1
1 TABLET ORAL EVERY 4 HOURS PRN
Qty: 20 TABLET | Refills: 0 | Status: SHIPPED | OUTPATIENT
Start: 2022-06-16 | End: 2022-06-20 | Stop reason: SDUPTHER

## 2022-06-16 RX ORDER — HYDROCODONE BITARTRATE AND ACETAMINOPHEN 5; 325 MG/1; MG/1
1 TABLET ORAL EVERY 4 HOURS PRN
Status: DISCONTINUED | OUTPATIENT
Start: 2022-06-16 | End: 2022-06-16 | Stop reason: HOSPADM

## 2022-06-16 RX ORDER — SODIUM CHLORIDE 9 MG/ML
INJECTION, SOLUTION INTRAVENOUS CONTINUOUS
Status: DISCONTINUED | OUTPATIENT
Start: 2022-06-16 | End: 2022-06-16 | Stop reason: HOSPADM

## 2022-06-16 RX ORDER — ONDANSETRON 2 MG/ML
INJECTION INTRAMUSCULAR; INTRAVENOUS
Status: DISCONTINUED | OUTPATIENT
Start: 2022-06-16 | End: 2022-06-16

## 2022-06-16 RX ORDER — ACETAMINOPHEN 500 MG
1000 TABLET ORAL ONCE
Status: COMPLETED | OUTPATIENT
Start: 2022-06-16 | End: 2022-06-16

## 2022-06-16 RX ORDER — FENTANYL CITRATE 50 UG/ML
INJECTION, SOLUTION INTRAMUSCULAR; INTRAVENOUS
Status: DISCONTINUED | OUTPATIENT
Start: 2022-06-16 | End: 2022-06-16

## 2022-06-16 RX ORDER — SCOLOPAMINE TRANSDERMAL SYSTEM 1 MG/1
PATCH, EXTENDED RELEASE TRANSDERMAL
Status: DISCONTINUED | OUTPATIENT
Start: 2022-06-16 | End: 2022-06-16

## 2022-06-16 RX ORDER — MIDAZOLAM HYDROCHLORIDE 1 MG/ML
INJECTION, SOLUTION INTRAMUSCULAR; INTRAVENOUS
Status: DISCONTINUED | OUTPATIENT
Start: 2022-06-16 | End: 2022-06-16

## 2022-06-16 RX ORDER — ONDANSETRON 2 MG/ML
4 INJECTION INTRAMUSCULAR; INTRAVENOUS DAILY PRN
Status: DISCONTINUED | OUTPATIENT
Start: 2022-06-16 | End: 2022-06-16 | Stop reason: HOSPADM

## 2022-06-16 RX ORDER — IBUPROFEN 800 MG/1
800 TABLET ORAL EVERY 8 HOURS
Qty: 40 TABLET | Refills: 0 | OUTPATIENT
Start: 2022-06-16 | End: 2022-12-13

## 2022-06-16 RX ORDER — KETOROLAC TROMETHAMINE 30 MG/ML
30 INJECTION, SOLUTION INTRAMUSCULAR; INTRAVENOUS ONCE
Status: COMPLETED | OUTPATIENT
Start: 2022-06-16 | End: 2022-06-16

## 2022-06-16 RX ORDER — HYDROMORPHONE HYDROCHLORIDE 2 MG/ML
1 INJECTION, SOLUTION INTRAMUSCULAR; INTRAVENOUS; SUBCUTANEOUS EVERY 6 HOURS PRN
Status: DISCONTINUED | OUTPATIENT
Start: 2022-06-16 | End: 2022-06-16 | Stop reason: HOSPADM

## 2022-06-16 RX ORDER — IBUPROFEN 600 MG/1
600 TABLET ORAL EVERY 8 HOURS PRN
Qty: 30 TABLET | Refills: 1 | Status: SHIPPED | OUTPATIENT
Start: 2022-06-16 | End: 2022-12-13 | Stop reason: SDUPTHER

## 2022-06-16 RX ORDER — IBUPROFEN 600 MG/1
600 TABLET ORAL EVERY 6 HOURS PRN
Status: DISCONTINUED | OUTPATIENT
Start: 2022-06-16 | End: 2022-06-16 | Stop reason: HOSPADM

## 2022-06-16 RX ORDER — PROCHLORPERAZINE EDISYLATE 5 MG/ML
5 INJECTION INTRAMUSCULAR; INTRAVENOUS EVERY 6 HOURS PRN
Status: DISCONTINUED | OUTPATIENT
Start: 2022-06-16 | End: 2022-06-16 | Stop reason: HOSPADM

## 2022-06-16 RX ORDER — PROPOFOL 10 MG/ML
VIAL (ML) INTRAVENOUS
Status: DISCONTINUED | OUTPATIENT
Start: 2022-06-16 | End: 2022-06-16

## 2022-06-16 RX ORDER — PHENYLEPHRINE HYDROCHLORIDE 10 MG/ML
INJECTION INTRAVENOUS
Status: DISCONTINUED | OUTPATIENT
Start: 2022-06-16 | End: 2022-06-16

## 2022-06-16 RX ADMIN — HYDROMORPHONE HYDROCHLORIDE 0.2 MG: 2 INJECTION INTRAMUSCULAR; INTRAVENOUS; SUBCUTANEOUS at 05:06

## 2022-06-16 RX ADMIN — KETAMINE HYDROCHLORIDE 20 MG: 100 INJECTION, SOLUTION, CONCENTRATE INTRAMUSCULAR; INTRAVENOUS at 02:06

## 2022-06-16 RX ADMIN — LIDOCAINE HYDROCHLORIDE 100 MG: 20 INJECTION, SOLUTION INTRAVENOUS at 02:06

## 2022-06-16 RX ADMIN — SODIUM CHLORIDE, SODIUM LACTATE, POTASSIUM CHLORIDE, AND CALCIUM CHLORIDE: .6; .31; .03; .02 INJECTION, SOLUTION INTRAVENOUS at 11:06

## 2022-06-16 RX ADMIN — SUGAMMADEX 50 MG: 100 INJECTION, SOLUTION INTRAVENOUS at 04:06

## 2022-06-16 RX ADMIN — PHENYLEPHRINE HYDROCHLORIDE 50 MCG: 10 INJECTION INTRAVENOUS at 04:06

## 2022-06-16 RX ADMIN — KETAMINE HYDROCHLORIDE 10 MG: 100 INJECTION, SOLUTION, CONCENTRATE INTRAMUSCULAR; INTRAVENOUS at 03:06

## 2022-06-16 RX ADMIN — ROCURONIUM BROMIDE 30 MG: 10 INJECTION, SOLUTION INTRAVENOUS at 02:06

## 2022-06-16 RX ADMIN — PROPOFOL 20 MG: 10 INJECTION, EMULSION INTRAVENOUS at 04:06

## 2022-06-16 RX ADMIN — MIDAZOLAM HYDROCHLORIDE 2 MG: 1 INJECTION, SOLUTION INTRAMUSCULAR; INTRAVENOUS at 02:06

## 2022-06-16 RX ADMIN — HYDROCODONE BITARTRATE AND ACETAMINOPHEN 1 TABLET: 5; 325 TABLET ORAL at 06:06

## 2022-06-16 RX ADMIN — PHENYLEPHRINE HYDROCHLORIDE 50 MCG: 10 INJECTION INTRAVENOUS at 02:06

## 2022-06-16 RX ADMIN — PROPOFOL 40 MG: 10 INJECTION, EMULSION INTRAVENOUS at 03:06

## 2022-06-16 RX ADMIN — PROPOFOL 180 MG: 10 INJECTION, EMULSION INTRAVENOUS at 02:06

## 2022-06-16 RX ADMIN — PHENYLEPHRINE HYDROCHLORIDE 50 MCG: 10 INJECTION INTRAVENOUS at 03:06

## 2022-06-16 RX ADMIN — GLYCOPYRROLATE 0.1 MG: 0.2 INJECTION, SOLUTION INTRAMUSCULAR; INTRAVITREAL at 03:06

## 2022-06-16 RX ADMIN — SCOPOLAMINE 1 PATCH: 1 PATCH, EXTENDED RELEASE TRANSDERMAL at 02:06

## 2022-06-16 RX ADMIN — DEXAMETHASONE SODIUM PHOSPHATE 4 MG: 4 INJECTION, SOLUTION INTRAMUSCULAR; INTRAVENOUS at 02:06

## 2022-06-16 RX ADMIN — ROCURONIUM BROMIDE 20 MG: 10 INJECTION, SOLUTION INTRAVENOUS at 03:06

## 2022-06-16 RX ADMIN — ACETAMINOPHEN 1000 MG: 500 TABLET ORAL at 12:06

## 2022-06-16 RX ADMIN — FENTANYL CITRATE 25 MCG: 50 INJECTION, SOLUTION INTRAMUSCULAR; INTRAVENOUS at 04:06

## 2022-06-16 RX ADMIN — ONDANSETRON 4 MG: 2 INJECTION, SOLUTION INTRAMUSCULAR; INTRAVENOUS at 02:06

## 2022-06-16 RX ADMIN — SODIUM CHLORIDE, SODIUM LACTATE, POTASSIUM CHLORIDE, AND CALCIUM CHLORIDE: .6; .31; .03; .02 INJECTION, SOLUTION INTRAVENOUS at 02:06

## 2022-06-16 RX ADMIN — SODIUM CHLORIDE, SODIUM LACTATE, POTASSIUM CHLORIDE, AND CALCIUM CHLORIDE: .6; .31; .03; .02 INJECTION, SOLUTION INTRAVENOUS at 03:06

## 2022-06-16 RX ADMIN — FENTANYL CITRATE 100 MCG: 50 INJECTION, SOLUTION INTRAMUSCULAR; INTRAVENOUS at 02:06

## 2022-06-16 RX ADMIN — KETOROLAC TROMETHAMINE 30 MG: 30 INJECTION, SOLUTION INTRAMUSCULAR at 07:06

## 2022-06-16 RX ADMIN — ONDANSETRON 4 MG: 2 INJECTION INTRAMUSCULAR; INTRAVENOUS at 08:06

## 2022-06-16 RX ADMIN — GLYCOPYRROLATE 0.1 MG: 0.2 INJECTION, SOLUTION INTRAMUSCULAR; INTRAVITREAL at 02:06

## 2022-06-16 RX ADMIN — KETAMINE HYDROCHLORIDE 20 MG: 100 INJECTION, SOLUTION, CONCENTRATE INTRAMUSCULAR; INTRAVENOUS at 03:06

## 2022-06-16 RX ADMIN — SUCCINYLCHOLINE CHLORIDE 100 MG: 20 INJECTION, SOLUTION INTRAMUSCULAR; INTRAVENOUS at 02:06

## 2022-06-16 NOTE — BRIEF OP NOTE
[facility] - Surgery  Brief Operative Note    Surgery Date: 6/16/2022     Surgeon(s) and Role:     * Joi Najera MD - Primary     * Francine Nunez MD - Assisting        Pre-op Diagnosis:  Malpositioned intrauterine device (IUD), subsequent encounter [T83.32XD]    Post-op Diagnosis:  Post-Op Diagnosis Codes:     * Malpositioned intrauterine device (IUD), subsequent encounter [T83.32XD]    Procedure(s) (LRB):  DIAGNOSTIC HYSTEROSCOPY  LAPAROSCOPY, DIAGNOSTIC (N/A)    Anesthesia: General    Operative Findings: uterus sounds 7 cm, hysteroscopy with unremarkable cavity, inspected systematically to both cornuae and internal os, IUD not seen.  Laparoscopy with normal ovaries and tubes, some scarring at site of prior bladder flap and adhesions omentum to anterior abdominal wall, released.  No IUD noted beneath bladder flap, and fluoroscopy of entire abdomen, pelvis without evidence of IUD, reading confirmed by radiologist.     Estimated Blood Loss: 100 mL    IVF 1400 cc     cc         Specimens:   Specimen (24h ago, onward)            None            Discharge Note    OUTCOME: Patient tolerated treatment/procedure well without complication and is now ready for discharge.    DISPOSITION: Home or Self Care    FINAL DIAGNOSIS:  <principal problem not specified>    FOLLOWUP: In clinic    DISCHARGE INSTRUCTIONS:  No discharge procedures on file.     Current Outpatient Medications   Medication Instructions    buPROPion (WELLBUTRIN XL) 150 mg, Oral, Every morning    hydroCHLOROthiazide (HYDRODIURIL) 25 mg, Oral, Daily    ibuprofen (ADVIL,MOTRIN) 800 mg, Oral, Every 8 hours    ibuprofen (ADVIL,MOTRIN) 600 mg, Oral, Every 8 hours PRN    NIFEdipine (PROCARDIA-XL) 30 mg, Oral, Daily    oxyCODONE-acetaminophen (PERCOCET) 5-325 mg per tablet 1 tablet, Oral, Every 4 hours PRN

## 2022-06-16 NOTE — ANESTHESIA PROCEDURE NOTES
Intubation    Date/Time: 6/16/2022 2:41 PM  Performed by: Danette Patel CRNA  Authorized by: Santiago Sheets MD     Intubation:     Induction:  Intravenous    Intubated:  Postinduction    Mask Ventilation:  Easy mask    Attempts:  1    Attempted By:  OZ    Blade:  Yan 3    Laryngeal View Grade: Grade I - full view of cords      Difficult Airway Encountered?: No      Complications:  None    Airway Device:  Oral endotracheal tube    Airway Device Size:  7.0    Style/Cuff Inflation:  Cuffed    Inflation Amount (mL):  5    Tube secured:  21    Secured at:  The lips    Placement Verified By:  Capnometry    Complicating Factors:  None    Findings Post-Intubation:  BS equal bilateral and atraumatic/condition of teeth unchanged

## 2022-06-16 NOTE — ANESTHESIA POSTPROCEDURE EVALUATION
Anesthesia Post Evaluation    Patient: Azucena Magana    Procedure(s) Performed: Procedure(s) (LRB):  LAPAROSCOPY, DIAGNOSTIC and DIAGNOSTIC HYSTEROSCOPY (N/A)    Final Anesthesia Type: general      Patient location during evaluation: PACU  Patient participation: Yes- Able to Participate  Level of consciousness: awake and alert  Post-procedure vital signs: reviewed and stable  Pain management: adequate  Airway patency: patent    PONV status at discharge: No PONV  Anesthetic complications: no      Cardiovascular status: hemodynamically stable  Respiratory status: unassisted and spontaneous ventilation  Hydration status: euvolemic  Follow-up not needed.      D/w patient recommendation for an additional method of contraception for next 2 weeks to reduce chance of unplanned pregnancy. Pt states her understanding.     Vitals Value Taken Time   /85 06/16/22 1747   Temp 36.6 °C (97.9 °F) 06/16/22 1730   Pulse 71 06/16/22 1755   Resp 21 06/16/22 1755   SpO2 94 % 06/16/22 1755   Vitals shown include unvalidated device data.      Event Time   Out of Recovery 18:00:00         Pain/Danae Score: Pain Rating Prior to Med Admin: 6 (6/16/2022  5:37 PM)  Danae Score: 9 (6/16/2022  6:00 PM)

## 2022-06-16 NOTE — OP NOTE
[facility] - Surgery  Operative Note    Surgery Date: 6/16/2022     Surgeon(s) and Role:     * Joi Najera MD - Primary     * Francine Nunez MD - Assisting    Pre-op Diagnosis:  Malpositioned intrauterine device (IUD), subsequent encounter [T83.32XD]    Post-op Diagnosis:  Post-Op Diagnosis Codes:     * Malpositioned intrauterine device (IUD), subsequent encounter [T83.32XD]    Procedure(s) (LRB):  DIAGNOSTIC HYSTEROSCOPY  LAPAROSCOPY, DIAGNOSTIC (N/A)    Anesthesia: General    Operative Findings: uterus sounds 7 cm, hysteroscopy with unremarkable cavity, inspected systematically to both cornuae and internal os, IUD not seen.  Laparoscopy with normal ovaries and tubes, some scarring at site of prior bladder flap and adhesions omentum to anterior abdominal wall, released.  No IUD noted beneath bladder flap, and fluoroscopy of entire abdomen, pelvis without evidence of IUD, reading confirmed by radiologist.     Estimated Blood Loss: 100 mL    IVF 1400 cc     cc         PROCEDURE:   Patient was taken to the operating room where general anesthesia was administered and found to be adequate.  She was placed in the dorsal lithotomy position using Clarence stirrups, then prepped and draped in the usual sterile fashion. Bladder was drained via in-and-out catheterization prior to procedure.  A surgical timeout was performed with patient's name, date of birth, procedure to be performed, and allergies verbalized. All OR staff in agreement. No preoperative antibiotics were administered as none were indicated.     Attention was then turned to the vagina where a weighted sterile speculum was placed in the posterior aspect, and a right angle retractor was placed in the anterior aspect.  The anterior lip of the cervix was grasped with a single tooth tenaculum.  The uterus was sounded as above. The cervix was serially dilated to 6 mm. The 6mm hysteroscope was advanced through the cervical os and the uterus was  distended with normal saline.  The endometrium was inspected and findings as noted above.  The tubal ostia were identified without difficulty, and appeared normal. The entire cavity was systematically inspected, and there was no evidence of the IUD or string. The hysteroscope was withdrawn without difficulty. The tenaculum was removed and hemostasis was noted at the puncture sites in the cervix, and a Hulka tenaculum was placed for uterine manipulation.     Gloves were changed. A 10 mm umbilical skin incision was made with the scalpel. A Veress needle was inserted into the umbilicus under tenting of the anterior abdominal wall. Placement into the peritoneal cavity was confirmed via saline drop test. The abdomen was insufflated to 15mm Hg using Carbon dioxide. A 10 mm trocar was advanced through this incision. Excellent hemostasis was noted. The patient was placed in deep Trendelenburg. An 5 mm left lateral skin incision was made with a scalpel and a 5 mm trocar was advanced through this incision under visualization of the camera. A 5 mm right lateral skin incision was made with the scalpel. A 5 mm trocar was advanced through this incision under visualization of the camera. Excellent hemostasis was noted. Survey of the abdomen and pelvis revealed findings as noted above.     The abdomen was systematically inspected, and the IUD was not seen in the anterior or posterior CDS, adjacent to the broad ligaments, or adherent to the omentum.   There was some scarring in the area of the prior bladder flap incision, so the vesicouterine peritoneum was opened to allow inspection of the vesicouterine space.  A C-arm was requested to localize the IUD using fluoroscopy, but the IUD was not seen in either the pelvis or the abdomen.  Images from the patient's prior CT and pelvic ultrasound were reviewed intraoperatively, and no foreign bodies were seen in those areas.  Consultation was requested with radiologist who reviewed images  in comparison to prior studies and could not identify the device.    The abdomen was deflated and all trocars were removed.  The 11mm port fascia was closed with a singled interrupted suture.  The skin was closed with 4-0 Monocryl in a subcuticular fashion after local infiltration with a dilute marcaine solution.  The uterine manipulator and buenrostro catheter were removed.  Sponge, lap, and needle counts were correct x 2.  The patient was taken to the recovery room in stable condition with the buenrostro draining urine.    Joi Najera MD  Obstetrics and Gynecology

## 2022-06-16 NOTE — DISCHARGE INSTRUCTIONS
Fall Prevention  Millions of people fall every year and injure themselves. You may have had anesthesia or sedation which may increase your risk of falling. You may have health issues that put you at an increased risk of falling.     Here are ways to reduce your risk of falling.    Make your home safe by keeping walkways clear of objects you may trip over.  Use non-slip pads under rugs. Do not use area rugs or small throw rugs.  Use non-slip mats in bathtubs and showers.  Install handrails and lights on staircases.  Do not walk in poorly lit areas.  Do not stand on chairs or wobbly ladders.  Use caution when reaching overhead or looking upward. This position can cause a loss of balance.  Be sure your shoes fit properly, have non-slip bottoms and are in good condition.   Wear shoes both inside and out. Avoid going barefoot or wearing slippers.  Be cautious when going up and down stairs, curbs, and when walking on uneven sidewalks.  If your balance is poor, consider using a cane or walker.  If your fall was related to alcohol use, stop or limit alcohol intake.   If your fall was related to use of sleeping medicines, talk to your doctor about this. You may need to reduce your dosage at bedtime if you awaken during the night to go to the bathroom.    To reduce the need for nighttime bathroom trips:  Avoid drinking fluids for several hours before going to bed  Empty your bladder before going to bed  Men can keep a urinal at the bedside  Stay as active as you can. Balance, flexibility, strength, and endurance all come from exercise. They all play a role in preventing falls. Ask your healthcare provider which types of activity are right for you.  Get your vision checked on a regular basis.  If you have pets, know where they are before you stand up or walk so you don't trip over them.  Use night lights.         BATHING:                   You may shower after your dressing is removed, but no tub baths, hot tubs, saunas or  swimming until you see the doctor.    DRESSING:  Remove your bandage in 24 hours. If there are skin tapes over the incision, leave them in place. They will start to come off in 5-7 days.  ACTIVITY LEVEL: If you have received sedation or an anesthetic, you may feel sleepy for   several hours. Rest until you are more awake. Gradually resume your normal activities  No heavy lifting or straining, nothing over 10 lbs., like a gallon of milk.  Pelvic rest- no sex, tampons or douching until follow up or instructed by doctor.  DIET:  You may resume your home diet. If nausea is present, increase your diet gradually with fluids and bland foods.    Medications:  Pain medication should be taken only if needed and as directed. If antibiotics are prescribed, the medication should be taken until completed. You will be given an updated list of you medications.  No driving, alcoholic beverages or signing legal documents for next 24 hours or while taking pain medication    CALL THE DOCTOR:   For any obvious bleeding (some dried blood over the incision is normal).     Redness, swelling, foul smell around incision or fever over 101.  Shortness of breath, Coughing up Bloody Sputum or Pains or Swelling in your calves.  Persistent pain or nausea not relieved by medication.  If vaginal bleeding is in excess of a normal period.  Problems urinating    If any unusual problems or difficulties occur contact your doctor. If you cannot contact your doctor but feel your signs and symptoms warrant a physicians attention return to the emergency room. Notice:  During your procedure, you were given a drug called Sugammadex to reverse the effects of anesthesia.  As a result, hormonal birth control (such as birth control pills, patches, rings, injections, or IUDs) may not work properly.  Females should use an additional, non-hormonal method of birth control (such as condoms or spermicidal jelly) for 7 days after receiving Sugammadex.

## 2022-06-16 NOTE — TRANSFER OF CARE
Anesthesia Transfer of Care Note    Patient: Azucena Magana    Procedure(s) Performed: Procedure(s) (LRB):  DIAGNOSTIC HYSTEROSCOPY, DIAGNOSTIC LAPAROSCOPY (N/A)  LAPAROSCOPY, DIAGNOSTIC (N/A)  REMOVAL, INTRAUTERINE DEVICE (N/A)    Patient location: PACU    Anesthesia Type: general    Transport from OR: Transported from OR on 100% O2 by closed face mask with adequate spontaneous ventilation    Post pain: adequate analgesia    Post assessment: no apparent anesthetic complications and tolerated procedure well    Post vital signs: stable    Level of consciousness: sedated    Nausea/Vomiting: no nausea/vomiting    Complications: none    Transfer of care protocol was followed      Last vitals:   Visit Vitals  /82 (BP Location: Right arm, Patient Position: Lying)   Pulse 72   Temp 36.5 °C (97.7 °F)   Resp 17   Wt 91.9 kg (202 lb 11 oz)   LMP 05/28/2022   SpO2 100%   BMI 37.07 kg/m²

## 2022-06-17 ENCOUNTER — PATIENT MESSAGE (OUTPATIENT)
Dept: OBSTETRICS AND GYNECOLOGY | Facility: CLINIC | Age: 37
End: 2022-06-17
Payer: MEDICAID

## 2022-06-17 ENCOUNTER — TELEPHONE (OUTPATIENT)
Dept: OBSTETRICS AND GYNECOLOGY | Facility: CLINIC | Age: 37
End: 2022-06-17
Payer: MEDICAID

## 2022-06-17 NOTE — TELEPHONE ENCOUNTER
Left voicemail to get pt scheduled.     ----- Message from Colt Garrett sent at 6/17/2022  9:40 AM CDT -----  Type: Patient Call Back    Who called:self    What is the request in detail:Pt states she had a surgery done yesterday, 6/16. PT states the doctor told her to call today to be seen by the doctor.    Can the clinic reply by [facility]?no    Would the patient rather a call back or a response via [facility]? call    Best call back number:[phone]

## 2022-06-20 ENCOUNTER — OFFICE VISIT (OUTPATIENT)
Dept: OBSTETRICS AND GYNECOLOGY | Facility: CLINIC | Age: 37
End: 2022-06-20
Payer: MEDICAID

## 2022-06-20 VITALS
WEIGHT: 214.81 LBS | DIASTOLIC BLOOD PRESSURE: 92 MMHG | SYSTOLIC BLOOD PRESSURE: 138 MMHG | BODY MASS INDEX: 39.29 KG/M2

## 2022-06-20 DIAGNOSIS — T83.32XS MALPOSITIONED INTRAUTERINE DEVICE (IUD), SEQUELA: ICD-10-CM

## 2022-06-20 DIAGNOSIS — Z09 POSTOP CHECK: Primary | ICD-10-CM

## 2022-06-20 DIAGNOSIS — Z98.890 S/P LAPAROSCOPY: ICD-10-CM

## 2022-06-20 PROCEDURE — 99999 PR PBB SHADOW E&M-EST. PATIENT-LVL III: CPT | Mod: PBBFAC,,, | Performed by: OBSTETRICS & GYNECOLOGY

## 2022-06-20 PROCEDURE — 1160F PR REVIEW ALL MEDS BY PRESCRIBER/CLIN PHARMACIST DOCUMENTED: ICD-10-PCS | Mod: CPTII,,, | Performed by: OBSTETRICS & GYNECOLOGY

## 2022-06-20 PROCEDURE — 3008F BODY MASS INDEX DOCD: CPT | Mod: CPTII,,, | Performed by: OBSTETRICS & GYNECOLOGY

## 2022-06-20 PROCEDURE — 1159F MED LIST DOCD IN RCRD: CPT | Mod: CPTII,,, | Performed by: OBSTETRICS & GYNECOLOGY

## 2022-06-20 PROCEDURE — 3075F SYST BP GE 130 - 139MM HG: CPT | Mod: CPTII,,, | Performed by: OBSTETRICS & GYNECOLOGY

## 2022-06-20 PROCEDURE — 3080F PR MOST RECENT DIASTOLIC BLOOD PRESSURE >= 90 MM HG: ICD-10-PCS | Mod: CPTII,,, | Performed by: OBSTETRICS & GYNECOLOGY

## 2022-06-20 PROCEDURE — 3080F DIAST BP >= 90 MM HG: CPT | Mod: CPTII,,, | Performed by: OBSTETRICS & GYNECOLOGY

## 2022-06-20 PROCEDURE — 1159F PR MEDICATION LIST DOCUMENTED IN MEDICAL RECORD: ICD-10-PCS | Mod: CPTII,,, | Performed by: OBSTETRICS & GYNECOLOGY

## 2022-06-20 PROCEDURE — 3075F PR MOST RECENT SYSTOLIC BLOOD PRESS GE 130-139MM HG: ICD-10-PCS | Mod: CPTII,,, | Performed by: OBSTETRICS & GYNECOLOGY

## 2022-06-20 PROCEDURE — 99999 PR PBB SHADOW E&M-EST. PATIENT-LVL III: ICD-10-PCS | Mod: PBBFAC,,, | Performed by: OBSTETRICS & GYNECOLOGY

## 2022-06-20 PROCEDURE — 99213 OFFICE O/P EST LOW 20 MIN: CPT | Mod: PBBFAC | Performed by: OBSTETRICS & GYNECOLOGY

## 2022-06-20 PROCEDURE — 99024 POSTOP FOLLOW-UP VISIT: CPT | Mod: ,,, | Performed by: OBSTETRICS & GYNECOLOGY

## 2022-06-20 PROCEDURE — 1160F RVW MEDS BY RX/DR IN RCRD: CPT | Mod: CPTII,,, | Performed by: OBSTETRICS & GYNECOLOGY

## 2022-06-20 PROCEDURE — 99024 PR POST-OP FOLLOW-UP VISIT: ICD-10-PCS | Mod: ,,, | Performed by: OBSTETRICS & GYNECOLOGY

## 2022-06-20 PROCEDURE — 3008F PR BODY MASS INDEX (BMI) DOCUMENTED: ICD-10-PCS | Mod: CPTII,,, | Performed by: OBSTETRICS & GYNECOLOGY

## 2022-06-20 RX ORDER — OXYCODONE AND ACETAMINOPHEN 5; 325 MG/1; MG/1
1 TABLET ORAL EVERY 4 HOURS PRN
Qty: 20 TABLET | Refills: 0 | Status: SHIPPED | OUTPATIENT
Start: 2022-06-20

## 2022-07-05 ENCOUNTER — HOSPITAL ENCOUNTER (OUTPATIENT)
Dept: RADIOLOGY | Facility: OTHER | Age: 37
Discharge: HOME | End: 2022-07-05
Attending: OBSTETRICS & GYNECOLOGY
Payer: MEDICAID

## 2022-07-05 DIAGNOSIS — Z09 POSTOP CHECK: ICD-10-CM

## 2022-07-05 DIAGNOSIS — T83.32XS MALPOSITIONED INTRAUTERINE DEVICE (IUD), SEQUELA: ICD-10-CM

## 2022-07-05 PROCEDURE — A9698 NON-RAD CONTRAST MATERIALNOC: HCPCS | Performed by: OBSTETRICS & GYNECOLOGY

## 2022-07-05 PROCEDURE — 25500020 PHARM REV CODE 255: Performed by: OBSTETRICS & GYNECOLOGY

## 2022-07-05 PROCEDURE — 74177 CT ABD & PELVIS W/CONTRAST: CPT | Mod: TC

## 2022-07-05 PROCEDURE — 74177 CT ABD & PELVIS W/CONTRAST: CPT | Mod: 26,,, | Performed by: RADIOLOGY

## 2022-07-05 PROCEDURE — 74177 CT ABDOMEN PELVIS WITH CONTRAST: ICD-10-PCS | Mod: 26,,, | Performed by: RADIOLOGY

## 2022-07-05 RX ADMIN — IOHEXOL 500 ML: 12 SOLUTION ORAL at 04:07

## 2022-07-05 RX ADMIN — IOHEXOL 100 ML: 350 INJECTION, SOLUTION INTRAVENOUS at 04:07

## 2022-07-06 ENCOUNTER — PROCEDURE VISIT (OUTPATIENT)
Dept: OBSTETRICS AND GYNECOLOGY | Facility: CLINIC | Age: 37
End: 2022-07-06
Payer: MEDICAID

## 2022-07-06 VITALS
SYSTOLIC BLOOD PRESSURE: 148 MMHG | WEIGHT: 206.44 LBS | BODY MASS INDEX: 37.76 KG/M2 | DIASTOLIC BLOOD PRESSURE: 78 MMHG

## 2022-07-06 DIAGNOSIS — Z32.02 URINE PREGNANCY TEST NEGATIVE: Primary | ICD-10-CM

## 2022-07-06 DIAGNOSIS — Z30.017 NEXPLANON INSERTION: ICD-10-CM

## 2022-07-06 PROBLEM — O99.820 GBS (GROUP B STREPTOCOCCUS CARRIER), +RV CULTURE, CURRENTLY PREGNANT: Status: RESOLVED | Noted: 2021-03-04 | Resolved: 2022-07-06

## 2022-07-06 PROBLEM — T83.32XA MALPOSITIONED INTRAUTERINE DEVICE: Status: RESOLVED | Noted: 2022-06-16 | Resolved: 2022-07-06

## 2022-07-06 PROBLEM — Z97.5 INTRAUTERINE DEVICE: Status: RESOLVED | Noted: 2021-05-18 | Resolved: 2022-07-06

## 2022-07-06 LAB
B-HCG UR QL: NEGATIVE
CTP QC/QA: YES

## 2022-07-06 PROCEDURE — 99499 NO LOS: ICD-10-PCS | Mod: S$PBB,,, | Performed by: OBSTETRICS & GYNECOLOGY

## 2022-07-06 PROCEDURE — 11981 PR INSERT, DRUG DELIVERY IMPLANT, BIORESORB/BIODEGR/NON-BIODEGR: ICD-10-PCS | Mod: S$PBB,,, | Performed by: OBSTETRICS & GYNECOLOGY

## 2022-07-06 PROCEDURE — 11981 INSERTION DRUG DLVR IMPLANT: CPT | Mod: S$PBB,,, | Performed by: OBSTETRICS & GYNECOLOGY

## 2022-07-06 PROCEDURE — 81025 URINE PREGNANCY TEST: CPT | Mod: PBBFAC | Performed by: OBSTETRICS & GYNECOLOGY

## 2022-07-06 PROCEDURE — 99499 UNLISTED E&M SERVICE: CPT | Mod: S$PBB,,, | Performed by: OBSTETRICS & GYNECOLOGY

## 2022-07-06 PROCEDURE — 11981 INSERTION DRUG DLVR IMPLANT: CPT | Mod: PBBFAC | Performed by: OBSTETRICS & GYNECOLOGY

## 2022-07-06 RX ADMIN — ETONOGESTREL 68 MG: 68 IMPLANT SUBCUTANEOUS at 06:07

## 2022-07-06 NOTE — PROCEDURES
Procedures     NEXPLANON INSERTION:    Azucena Magana is a 37 y.o. female , who presents for insertion of Nexplanon device.  Patient's last menstrual period was 2022 (exact date)..  UPT is negative.  2022 CT ABD/PELVIS CONFIRMS NO RETAINED INTRAABDOMINAL IUD:  Impression:  Intrauterine device not visualized.  No significant free fluid in the pelvis.  Stones or trace sludge layer dependently in the gallbladder.  Peripherally calcified leiomyoma at the fundus.    2022:  following DIAGNOSTIC HYSTEROSCOPY,  LAPAROSCOPY, DIAGNOSTIC 2022  Operative Findings: uterus sounds 7 cm, hysteroscopy with unremarkable cavity, inspected systematically to both cornuae and internal os, IUD not seen.  Laparoscopy with normal ovaries and tubes, some scarring at site of prior bladder flap and adhesions omentum to anterior abdominal wall, released.  No IUD noted beneath bladder flap, and fluoroscopy of entire abdomen, pelvis without evidence of IUD, reading confirmed by radiologist, which was performed 2022.  There are no complaints, and both the procedure and the hospital course were uncomplicated. SOME RESIDUAL PAIN INCISIONS, GRADUALLY IMPROVING.  NEXPLANON CONFIRMED AVAILABLE FOR INSERTION WITH SCHEDULED POSTOP VISIT  OPERATIVE FINDINGS DISCUSSED WITH PATIENT AND ABD/PELVIC CT ORDERED AS D/W PT,  TO CONFIRM IUD IS NOT LOCATED WITHIN UTERUS, PELVIS, ABDOMEN    Pre-Nexplanon Insertion Counseling:  Contraception options were extensively reviewed, and the patient chooses Nexplanon.  Her history was reviewed, and no contraindications to Implenon were identified.  The procedure and its minimal risks of pain, bleeding, bruising, and infection at the insertion site were discussed.  Possible irregular menstrual bleeding pattern or amenorrhea were explained.  The need for protection for STD transmission was discussed.  Written information was provided and all of the patients questions were answered.   At the close of counseling, the patient had no further questions, and consents were signed.    Exam:  With the patient in supine position, the nondominant elbow was flexed at the elbow and externally rotated.  The insertion site was identified 6-8 cm above the elbow crease at the inferior side of the upper arm, INFERIOR TO the groove between the biceps and triceps.  The insertion site was marked, and a second erin was placed 6-8 cm above the first along the line of this groove.      Procedure:  A time out was performed.  The insertion site was prepped with betadine times three and injected with 1%lidocaine along the planned insertion site.  Using sterile technique, the Nexplanon applicator was removed from its packaging and the insert was verified.  The applicator was lowered parallel to the skin, and the skin was tented with the needle as the needle was inserted into the designated insertion tract. Using the approved device insertion procedure, the Nexplanon was deployed.  Visual inspection of the insertion device revealed successful deployment, and the Nexplanon could be palpated within the insertion tract.    A small adhesive bandage with a pressure dressing was placed over the insertion site.    The patient tolerated the procedure well.    Assessment:  Contraceptive Management with Nexplanon Insertion    Post-Implanon Counseling:  The patient was advised to manage post-Implanon pain with NSAIDs, Tylenol, or a prescription per the medication card.  She was advised to keep her arm elevated and apply intermittent ice packs to decrease pain and swelling over the first 24 hours.  She was advised to remove the pressure dressing after 24 hours and to report signs of infection, including worsening pain, redness, pus at the insertion site or bleeding through the bandage.  The patient was advised regarding the need to remove the device after 3 years.  Counseling lasted 15 minutes, and at the end of counseling, the patient  had no further questions.      Follow up:  Return for a visit in approximately 2 weeks to check insertion site.

## 2022-07-29 ENCOUNTER — PATIENT MESSAGE (OUTPATIENT)
Dept: OBSTETRICS AND GYNECOLOGY | Facility: CLINIC | Age: 37
End: 2022-07-29
Payer: MEDICAID

## 2022-08-01 ENCOUNTER — TELEPHONE (OUTPATIENT)
Dept: OBSTETRICS AND GYNECOLOGY | Facility: CLINIC | Age: 37
End: 2022-08-01
Payer: MEDICAID

## 2022-08-01 NOTE — TELEPHONE ENCOUNTER
----- Message from Colt Garrett sent at 8/1/2022 11:09 AM CDT -----  Type: Patient Call Back    Who called:self    What is the request in detail:Pt is requesting to speak with NP again regarding a few more questions.    Can the clinic reply by [facility]?no    Would the patient rather a call back or a response via [facility]? call    Best call back number:[phone] (H)

## 2022-08-03 ENCOUNTER — TELEPHONE (OUTPATIENT)
Dept: OBSTETRICS AND GYNECOLOGY | Facility: CLINIC | Age: 37
End: 2022-08-03
Payer: MEDICAID

## 2022-09-15 ENCOUNTER — HOSPITAL ENCOUNTER (EMERGENCY)
Facility: OTHER | Age: 37
Discharge: HOME | End: 2022-09-15
Attending: EMERGENCY MEDICINE
Payer: MEDICAID

## 2022-09-15 VITALS
DIASTOLIC BLOOD PRESSURE: 88 MMHG | WEIGHT: 194 LBS | HEART RATE: 63 BPM | RESPIRATION RATE: 18 BRPM | SYSTOLIC BLOOD PRESSURE: 162 MMHG | BODY MASS INDEX: 34.38 KG/M2 | TEMPERATURE: 98 F | HEIGHT: 63 IN | OXYGEN SATURATION: 97 %

## 2022-09-15 DIAGNOSIS — G89.29 CHRONIC NONINTRACTABLE HEADACHE, UNSPECIFIED HEADACHE TYPE: Primary | ICD-10-CM

## 2022-09-15 DIAGNOSIS — I10 HYPERTENSION, UNSPECIFIED TYPE: ICD-10-CM

## 2022-09-15 DIAGNOSIS — R51.9 CHRONIC NONINTRACTABLE HEADACHE, UNSPECIFIED HEADACHE TYPE: Primary | ICD-10-CM

## 2022-09-15 LAB
B-HCG UR QL: NEGATIVE
CTP QC/QA: YES
POC MOLECULAR INFLUENZA A AGN: NEGATIVE
POC MOLECULAR INFLUENZA B AGN: NEGATIVE
SARS-COV-2 RDRP RESP QL NAA+PROBE: NEGATIVE

## 2022-09-15 PROCEDURE — U0002 COVID-19 LAB TEST NON-CDC: HCPCS | Performed by: NURSE PRACTITIONER

## 2022-09-15 PROCEDURE — 81025 URINE PREGNANCY TEST: CPT | Performed by: PHYSICIAN ASSISTANT

## 2022-09-15 PROCEDURE — 25000003 PHARM REV CODE 250: Performed by: NURSE PRACTITIONER

## 2022-09-15 PROCEDURE — 96375 TX/PRO/DX INJ NEW DRUG ADDON: CPT

## 2022-09-15 PROCEDURE — 63600175 PHARM REV CODE 636 W HCPCS: Performed by: NURSE PRACTITIONER

## 2022-09-15 PROCEDURE — 96374 THER/PROPH/DIAG INJ IV PUSH: CPT

## 2022-09-15 PROCEDURE — 99285 EMERGENCY DEPT VISIT HI MDM: CPT | Mod: 25

## 2022-09-15 RX ORDER — METOCLOPRAMIDE HYDROCHLORIDE 5 MG/ML
10 INJECTION INTRAMUSCULAR; INTRAVENOUS
Status: COMPLETED | OUTPATIENT
Start: 2022-09-15 | End: 2022-09-15

## 2022-09-15 RX ORDER — KETOROLAC TROMETHAMINE 30 MG/ML
15 INJECTION, SOLUTION INTRAMUSCULAR; INTRAVENOUS
Status: COMPLETED | OUTPATIENT
Start: 2022-09-15 | End: 2022-09-15

## 2022-09-15 RX ORDER — NIFEDIPINE 30 MG/1
30 TABLET, EXTENDED RELEASE ORAL ONCE
Status: COMPLETED | OUTPATIENT
Start: 2022-09-15 | End: 2022-09-15

## 2022-09-15 RX ORDER — HYDRALAZINE HYDROCHLORIDE 25 MG/1
25 TABLET, FILM COATED ORAL
Status: COMPLETED | OUTPATIENT
Start: 2022-09-15 | End: 2022-09-15

## 2022-09-15 RX ADMIN — SODIUM CHLORIDE 1000 ML: 0.9 INJECTION, SOLUTION INTRAVENOUS at 09:09

## 2022-09-15 RX ADMIN — KETOROLAC TROMETHAMINE 15 MG: 30 INJECTION, SOLUTION INTRAMUSCULAR; INTRAVENOUS at 09:09

## 2022-09-15 RX ADMIN — NIFEDIPINE 30 MG: 30 TABLET, FILM COATED, EXTENDED RELEASE ORAL at 07:09

## 2022-09-15 RX ADMIN — METOCLOPRAMIDE 10 MG: 5 INJECTION, SOLUTION INTRAMUSCULAR; INTRAVENOUS at 09:09

## 2022-09-15 RX ADMIN — HYDRALAZINE HYDROCHLORIDE 25 MG: 25 TABLET, FILM COATED ORAL at 07:09

## 2022-09-15 NOTE — Clinical Note
"Azucena "Azucena" Washington was seen and treated in our emergency department on 9/15/2022.  She may return to work on 09/16/2022.       If you have any questions or concerns, please don't hesitate to call.      Nanci Padron NP"

## 2022-09-15 NOTE — FIRST PROVIDER EVALUATION
Emergency Department TeleTriage Encounter Note      CHIEF COMPLAINT    Chief Complaint   Patient presents with    Headache     C/o HA 8/10 X 3 days with no improvement. Stated just started works night and not sleeping as much. -n/v or change in vision. Current /101- PMH HTN stated has not taking BP medication today. All other VSS       VITAL SIGNS   Initial Vitals [09/15/22 1643]   BP Pulse Resp Temp SpO2   (!) 172/101 (!) 58 18 98.6 °F (37 °C) 100 %      MAP       --            ALLERGIES    Review of patient's allergies indicates:  No Known Allergies    PROVIDER TRIAGE NOTE  This is a teletriage evaluation of a 37 y.o. female presenting to the ED complaining of headache. Patient reports headache for the past 3 days. She denies numbness, tingling, weakness, vision changes, nausea, or vomiting. She has elevated blood pressure and has not taken her medication today.     Initial orders will be placed and care will be transferred to an alternate provider when patient is roomed for a full evaluation. Any additional orders and the final disposition will be determined by that provider.         ORDERS  Labs Reviewed   POCT URINE PREGNANCY       ED Orders (720h ago, onward)      Start Ordered     Status Ordering Provider    09/15/22 1652 09/15/22 1651  POCT urine pregnancy  Once         Ordered CINDY VELA              Virtual Visit Note: The provider triage portion of this emergency department evaluation and documentation was performed via luma-id, a HIPAA-compliant telemedicine application, in concert with a tele-presenter in the room. A face to face patient evaluation with one of my colleagues will occur once the patient is placed in an emergency department room.      DISCLAIMER: This note was prepared with [email or web address] voice recognition transcription software. Garbled syntax, mangled pronouns, and other bizarre constructions may be attributed to that software system.

## 2022-09-16 NOTE — ED PROVIDER NOTES
Encounter Date: 9/15/2022       History     Chief Complaint   Patient presents with    Headache     C/o HA 8/10 X 3 days with no improvement. Stated just started works night and not sleeping as much. -n/v or change in vision. Current /101- PMH HTN stated has not taking BP medication today. All other VSS     Chief complaint: Headache    36 y/o female with pmhx HTN and migraines presents for 8/10 constant bitemporal tension HA x 3 days.  Patient reports she woke up with the HA 3 days ago which gradually worsened.  No recent head trauma, LOC, or seizures. She reports sx are c/w her normal HA sx.  Denies lightheadedness or dizziness, photophobia, phonophobia, vision changes, n/v, gait changes.  Reports URI symptoms 2 weeks ago that have since resolved.  Menstrual cycle began today.  Has tried Acetaminophen and laying in a dark, quiet room, neither of which provided relief.  HTN noted in triage. Pt usually takes nifedipine and HCTZ, but forgot to today.    This is the extent of patient's complaints for this ER encounter.     The history is provided by the patient.   Review of patient's allergies indicates:  No Known Allergies  Past Medical History:   Diagnosis Date    Abnormal Pap smear of cervix     Hypertension     Migraine headache     Obesity      Past Surgical History:   Procedure Laterality Date     SECTION N/A 3/10/2021    Procedure:  SECTION;  Surgeon: Joi Najera MD;  Location: [facility] OR;  Service: OB/GYN;  Laterality: N/A;     SECTION      DIAGNOSTIC LAPAROSCOPY N/A 2022    Procedure: LAPAROSCOPY, DIAGNOSTIC and DIAGNOSTIC HYSTEROSCOPY;  Surgeon: Joi Najera MD;  Location: [facility] OR;  Service: OB/GYN;  Laterality: N/A;    NM TREAT ECTOPIC PREG,NON REMVAL Right 7/12/15    Right salpingectomy and partial cornual resection     Family History   Problem Relation Age of Onset    Ovarian cancer Maternal Grandmother     Colon cancer Neg Hx     Breast cancer Neg Hx       Social History     Tobacco Use    Smoking status: Never    Smokeless tobacco: Never   Substance Use Topics    Alcohol use: Yes     Comment: social    Drug use: No     Review of Systems   Constitutional:  Negative for fever.   HENT:  Negative for congestion, rhinorrhea and sore throat.    Respiratory:  Negative for cough and shortness of breath.    Cardiovascular:  Negative for chest pain.   Gastrointestinal:  Negative for abdominal pain.   Genitourinary:  Negative for difficulty urinating.   Musculoskeletal:  Negative for arthralgias, back pain, myalgias and neck pain.   Skin:  Negative for rash and wound.   Neurological:  Positive for headaches. Negative for weakness.   Psychiatric/Behavioral: Negative.       Physical Exam     Initial Vitals [09/15/22 1643]   BP Pulse Resp Temp SpO2   (!) 172/101 (!) 58 18 98.6 °F (37 °C) 100 %      MAP       --         Physical Exam    Vitals reviewed.  Constitutional: No distress.   HENT:   Head: Normocephalic and atraumatic.   Nose: Nose normal.   Mouth/Throat: Oropharynx is clear and moist and mucous membranes are normal.   Eyes: Conjunctivae, EOM and lids are normal. Right pupil is round. Left pupil is round. Pupils are equal.   Cardiovascular:  Normal rate, regular rhythm and normal heart sounds.           Pulmonary/Chest: Effort normal and breath sounds normal. No respiratory distress.   Musculoskeletal:         General: Normal range of motion.     Neurological: She is alert and oriented to person, place, and time. She has normal strength. No cranial nerve deficit or sensory deficit.   Skin: Skin is warm and dry. No rash noted.   Psychiatric: She has a normal mood and affect. Her speech is normal and behavior is normal.       ED Course   Procedures  Labs Reviewed   POCT URINE PREGNANCY   SARS-COV-2 RDRP GENE   POCT INFLUENZA A/B MOLECULAR          Imaging Results              CT Head Without Contrast (Final result)  Result time 09/15/22 20:26:12      Final result by  Dada Mcnulty MD (09/15/22 20:26:12)                   Impression:      No CT evidence of acute intracranial abnormality.      Electronically signed by: Dada Mcnulty MD  Date:    09/15/2022  Time:    20:26               Narrative:    EXAMINATION:  CT HEAD WITHOUT CONTRAST    CLINICAL HISTORY:  Headache, chronic, new features or increased frequency;    TECHNIQUE:  Low dose axial images were obtained through the head.  Coronal and sagittal reformations were also performed. Contrast was not administered.    COMPARISON:  05/06/2011.    FINDINGS:  No evidence of acute territorial infarct, hemorrhage, mass effect, or midline shift.    Ventricles are normal in size and configuration.    No displaced calvarial fracture.    Visualized paranasal sinuses and mastoid air cells are essentially clear.                                       Medications   sodium chloride 0.9% bolus 1,000 mL (1,000 mLs Intravenous New Bag 9/15/22 2104)   NIFEdipine 24 hr tablet 30 mg (30 mg Oral Given 9/15/22 1954)   hydrALAZINE tablet 25 mg (25 mg Oral Given 9/15/22 1954)   ketorolac injection 15 mg (15 mg Intravenous Given 9/15/22 2105)   metoclopramide HCl injection 10 mg (10 mg Intravenous Given 9/15/22 2105)     Medical Decision Making:   Initial Assessment:   29-year-old male presents for evaluation of headache.  She has a migraine history.  She also has a history of hypertension, did not take her blood pressure medication today.  She reports that she has had similar headaches in the past.  Clinical Tests:   Radiological Study: Ordered  ED Management:  Patient's last head CT was many years ago.  Given headache with hypertension, head CT ordered.  Will test for flu and COVID.  Patient reports that she had URI symptoms 2 weeks ago that have since resolved.  Afebrile.  Neurologically intact.  Plan to treat headache with IV fluids, Toradol, Reglan.  Will administer home blood pressure medications.           ED Course as of 09/15/22 1934    Thu Sep 15, 2022   2042 SARS-CoV-2 RNA, Amplification, Qual: Negative [EW]   2042 POC Molecular Influenza A Ag: Negative [EW]   2042 POC Molecular Influenza B Ag: Negative [EW]   2042 CT Head Without Contrast  Head CT read per Radiology, no acute findings. [EW]   2124 BP(!): 171/85 [EW]   2150 Patient reports significant improvement symptoms and is requesting to be discharged home.  Requesting work note.  Improvement blood pressure is noted.  Negative flu.  Negative COVID.  Head CT without acute findings.    Patient/caregiver voices understanding and feels comfortable with discharge plan.    The patient/caregiver acknowledges that close follow up with medical provider is required. Instructed to follow up with PCP within 2 days. Patient/caregiver was given specific return precautions. The patient/caregiver agrees to comply with all instruction and directions given in the ER.  [EW]      ED Course User Index  [EW] Nanci Padron NP                 Clinical Impression:   Final diagnoses:  [R51.9, G89.29] Chronic nonintractable headache, unspecified headache type (Primary)  [I10] Hypertension, unspecified type      ED Disposition Condition    Discharge Stable          ED Prescriptions    None       Follow-up Information       Follow up With Specialties Details Why Contact Info    Nikita Trinidad MD Internal Medicine Schedule an appointment as soon as possible for a visit in 2 days  [address] [facility] [address]  [facility] [address]  [phone]               Nanci Padron NP  09/15/22 2154

## 2022-09-16 NOTE — DISCHARGE INSTRUCTIONS
**Follow up with PCP in 24-48 hours. Return to ER with worsening of symptoms.     **Over the counter medications as needed as directed on package insert. Drink plenty fluids. Get plenty rest. Wash hands frequently.

## 2022-09-21 ENCOUNTER — OFFICE VISIT (OUTPATIENT)
Dept: OBSTETRICS AND GYNECOLOGY | Facility: CLINIC | Age: 37
End: 2022-09-21
Payer: MEDICAID

## 2022-09-21 ENCOUNTER — LAB VISIT (OUTPATIENT)
Dept: LAB | Facility: HOSPITAL | Age: 37
End: 2022-09-21
Attending: PHYSICIAN ASSISTANT
Payer: MEDICAID

## 2022-09-21 VITALS
BODY MASS INDEX: 34.91 KG/M2 | DIASTOLIC BLOOD PRESSURE: 84 MMHG | WEIGHT: 197.06 LBS | SYSTOLIC BLOOD PRESSURE: 120 MMHG

## 2022-09-21 DIAGNOSIS — Z91.89 AT RISK FOR SEXUALLY TRANSMITTED DISEASE DUE TO UNPROTECTED SEX: ICD-10-CM

## 2022-09-21 DIAGNOSIS — Z97.5 NEXPLANON IN PLACE: Primary | ICD-10-CM

## 2022-09-21 PROCEDURE — 87481 CANDIDA DNA AMP PROBE: CPT | Mod: 59 | Performed by: PHYSICIAN ASSISTANT

## 2022-09-21 PROCEDURE — 87591 N.GONORRHOEAE DNA AMP PROB: CPT | Mod: 59 | Performed by: PHYSICIAN ASSISTANT

## 2022-09-21 PROCEDURE — 99999 PR PBB SHADOW E&M-EST. PATIENT-LVL III: ICD-10-PCS | Mod: PBBFAC,,, | Performed by: PHYSICIAN ASSISTANT

## 2022-09-21 PROCEDURE — 3008F BODY MASS INDEX DOCD: CPT | Mod: CPTII,,, | Performed by: PHYSICIAN ASSISTANT

## 2022-09-21 PROCEDURE — 99999 PR PBB SHADOW E&M-EST. PATIENT-LVL III: CPT | Mod: PBBFAC,,, | Performed by: PHYSICIAN ASSISTANT

## 2022-09-21 PROCEDURE — 99214 OFFICE O/P EST MOD 30 MIN: CPT | Mod: S$PBB,,, | Performed by: PHYSICIAN ASSISTANT

## 2022-09-21 PROCEDURE — 3074F PR MOST RECENT SYSTOLIC BLOOD PRESSURE < 130 MM HG: ICD-10-PCS | Mod: CPTII,,, | Performed by: PHYSICIAN ASSISTANT

## 2022-09-21 PROCEDURE — 3079F PR MOST RECENT DIASTOLIC BLOOD PRESSURE 80-89 MM HG: ICD-10-PCS | Mod: CPTII,,, | Performed by: PHYSICIAN ASSISTANT

## 2022-09-21 PROCEDURE — 86592 SYPHILIS TEST NON-TREP QUAL: CPT | Performed by: PHYSICIAN ASSISTANT

## 2022-09-21 PROCEDURE — 36415 COLL VENOUS BLD VENIPUNCTURE: CPT | Performed by: PHYSICIAN ASSISTANT

## 2022-09-21 PROCEDURE — 3008F PR BODY MASS INDEX (BMI) DOCUMENTED: ICD-10-PCS | Mod: CPTII,,, | Performed by: PHYSICIAN ASSISTANT

## 2022-09-21 PROCEDURE — 87491 CHLMYD TRACH DNA AMP PROBE: CPT | Performed by: PHYSICIAN ASSISTANT

## 2022-09-21 PROCEDURE — 3074F SYST BP LT 130 MM HG: CPT | Mod: CPTII,,, | Performed by: PHYSICIAN ASSISTANT

## 2022-09-21 PROCEDURE — 99213 OFFICE O/P EST LOW 20 MIN: CPT | Mod: PBBFAC | Performed by: PHYSICIAN ASSISTANT

## 2022-09-21 PROCEDURE — 1159F PR MEDICATION LIST DOCUMENTED IN MEDICAL RECORD: ICD-10-PCS | Mod: CPTII,,, | Performed by: PHYSICIAN ASSISTANT

## 2022-09-21 PROCEDURE — 86803 HEPATITIS C AB TEST: CPT | Performed by: PHYSICIAN ASSISTANT

## 2022-09-21 PROCEDURE — 3079F DIAST BP 80-89 MM HG: CPT | Mod: CPTII,,, | Performed by: PHYSICIAN ASSISTANT

## 2022-09-21 PROCEDURE — 99214 PR OFFICE/OUTPT VISIT, EST, LEVL IV, 30-39 MIN: ICD-10-PCS | Mod: S$PBB,,, | Performed by: PHYSICIAN ASSISTANT

## 2022-09-21 PROCEDURE — 87340 HEPATITIS B SURFACE AG IA: CPT | Performed by: PHYSICIAN ASSISTANT

## 2022-09-21 PROCEDURE — 1159F MED LIST DOCD IN RCRD: CPT | Mod: CPTII,,, | Performed by: PHYSICIAN ASSISTANT

## 2022-09-21 PROCEDURE — 87389 HIV-1 AG W/HIV-1&-2 AB AG IA: CPT | Performed by: PHYSICIAN ASSISTANT

## 2022-09-21 NOTE — PROGRESS NOTES
CC: Screening for sexually transmitted infection    Azucena Magana is a 37 y.o. female  presents for questions about her nexplanon. Pt had device placed in July and had a prolonged cycle following. States she had no cycle in August and then one day of light bleeding this month. She is wondering if this is normal. She also wants STD screening  Patient's last menstrual period was 09/15/2022..  Denies any new rashes or lesions.  Denies pelvic or abdominal pain.  Denies vulvovaginal itching or irritation.  Denies abnormal or foul vaginal discharge. No known exposure to STD. No history of past sexually transmitted infection.     Last pap:  - nilm - hpv     ROS:  GENERAL: Denies weight gain or weight loss. Feeling well overall.   SKIN: Denies rash or lesions.   HEAD: Denies head injury or headache.   NODES: Denies enlarged lymph nodes.   CHEST: Denies chest pain or shortness of breath.   CARDIOVASCULAR: Denies palpitations or left sided chest pain.   ABDOMEN: No abdominal pain, constipation, diarrhea, nausea, vomiting or rectal bleeding.   URINARY: No frequency, dysuria, hematuria, or burning on urination.  REPRODUCTIVE: See HPI.   BREASTS: The patient performs breast self-examination and denies pain, lumps, or nipple discharge.   HEMATOLOGIC: No easy bruisability or excessive bleeding.   MUSCULOSKELETAL: Denies joint pain or swelling.   NEUROLOGIC: Denies syncope or weakness.   PSYCHIATRIC: Denies depression, anxiety or mood swings.    PHYSICAL EXAM:  APPEARANCE: Well nourished, well developed, in no acute distress.  AFFECT: Alert and oriented x 3  SKIN: Warm, dry, & intact. No acne or hirsutism.  NECK: Neck symmetric  NODES: No inguinal or femoral lymph node enlargement  CHEST:  Easy, even breaths.  MSK: nexplanon palpated in left arm   ABDOMEN: Soft.  Nontender, nondistended.  PELVIC: Normal external genitalia without lesions.  Normal hair distribution.  Adequate perineal body, normal urethral meatus.     Vagina moist and well rugated without lesions or discharge.  Cervix pink, without lesions, discharge or tenderness.  No significant cystocele or rectocele.    Bimanual exam shows uterus to be normal size, regular, mobile and nontender.  Adnexa without masses or tenderness.    EXTREMITIES: No edema.    Past Medical History:   Diagnosis Date    Abnormal Pap smear of cervix     Hypertension     Migraine headache     Obesity        Past Surgical History:   Procedure Laterality Date     SECTION N/A 3/10/2021    Procedure:  SECTION;  Surgeon: Joi Najera MD;  Location: [facility] L&D OR;  Service: OB/GYN;  Laterality: N/A;     SECTION      DIAGNOSTIC LAPAROSCOPY N/A 2022    Procedure: LAPAROSCOPY, DIAGNOSTIC and DIAGNOSTIC HYSTEROSCOPY;  Surgeon: Joi Najera MD;  Location: [facility] OR;  Service: OB/GYN;  Laterality: N/A;    MA TREAT ECTOPIC PREG,NON REMVAL Right 7/12/15    Right salpingectomy and partial cornual resection       Family History   Problem Relation Age of Onset    Ovarian cancer Maternal Grandmother     Colon cancer Neg Hx     Breast cancer Neg Hx        Social History     Socioeconomic History    Marital status: Single   Tobacco Use    Smoking status: Never    Smokeless tobacco: Never   Substance and Sexual Activity    Alcohol use: Yes     Comment: social    Drug use: No    Sexual activity: Yes     Partners: Male     Birth control/protection: I.U.D.       Current Outpatient Medications   Medication Sig Dispense Refill    hydroCHLOROthiazide (HYDRODIURIL) 25 MG tablet Take 1 tablet (25 mg total) by mouth once daily. 90 tablet 0    NIFEdipine (PROCARDIA-XL) 30 MG (OSM) 24 hr tablet TAKE 1 TABLET(30 MG) BY MOUTH EVERY DAY 30 tablet 0    buPROPion (WELLBUTRIN XL) 150 MG TB24 tablet Take 1 tablet (150 mg total) by mouth every morning. 30 tablet 5    ibuprofen (ADVIL,MOTRIN) 600 MG tablet Take 1 tablet (600 mg total) by mouth every 8 (eight) hours as needed for Pain. (Patient not  taking: Reported on 9/21/2022) 30 tablet 1    ibuprofen (ADVIL,MOTRIN) 800 MG tablet Take 1 tablet (800 mg total) by mouth every 8 (eight) hours. (Patient not taking: Reported on 9/21/2022) 40 tablet 0    oxyCODONE-acetaminophen (PERCOCET) 5-325 mg per tablet Take 1 tablet by mouth every 4 (four) hours as needed for Pain. (Patient not taking: Reported on 9/21/2022) 20 tablet 0     No current facility-administered medications for this visit.       Review of patient's allergies indicates:  No Known Allergies      Nexplanon in place    At risk for sexually transmitted disease due to unprotected sex  -     Hepatitis C Antibody; Future; Expected date: 09/21/2022  -     C. trachomatis/N. gonorrhoeae by AMP DNA  -     HIV-1 and HIV-2 antibodies; Future; Expected date: 09/21/2022  -     RPR; Future; Expected date: 09/21/2022  -     Hepatitis B surface antigen; Future; Expected date: 09/21/2022  -     Vaginosis Screen by DNA Probe          Patient was counseled today on prevention of STDs with use of condoms, abstinence, gardasil vaccine.   We also reviewed A.C.S. Pap guidelines and recommendations for yearly pelvic exams, mammograms and monthly self breast exams; to see her PCP for other health maintenance.     Followup pending lab results  Kerri Jauregui PA-C

## 2022-09-22 LAB
HBV SURFACE AG SERPL QL IA: NORMAL
HCV AB SERPL QL IA: NORMAL
HIV 1+2 AB+HIV1 P24 AG SERPL QL IA: NORMAL

## 2022-09-23 LAB
C TRACH DNA SPEC QL NAA+PROBE: NOT DETECTED
N GONORRHOEA DNA SPEC QL NAA+PROBE: NOT DETECTED
RPR SER QL: NORMAL

## 2022-09-26 RX ORDER — METRONIDAZOLE 500 MG/1
500 TABLET ORAL 2 TIMES DAILY
Qty: 14 TABLET | Refills: 0 | Status: SHIPPED | OUTPATIENT
Start: 2022-09-26 | End: 2022-10-03

## 2022-12-13 ENCOUNTER — HOSPITAL ENCOUNTER (EMERGENCY)
Facility: OTHER | Age: 37
Discharge: HOME | End: 2022-12-13
Attending: EMERGENCY MEDICINE
Payer: MEDICAID

## 2022-12-13 VITALS
HEIGHT: 63 IN | TEMPERATURE: 98 F | OXYGEN SATURATION: 100 % | WEIGHT: 192 LBS | HEART RATE: 66 BPM | BODY MASS INDEX: 34.02 KG/M2 | SYSTOLIC BLOOD PRESSURE: 138 MMHG | DIASTOLIC BLOOD PRESSURE: 82 MMHG | RESPIRATION RATE: 16 BRPM

## 2022-12-13 DIAGNOSIS — M25.512 ACUTE PAIN OF LEFT SHOULDER: Primary | ICD-10-CM

## 2022-12-13 LAB
B-HCG UR QL: NEGATIVE
CTP QC/QA: YES

## 2022-12-13 PROCEDURE — 81025 URINE PREGNANCY TEST: CPT | Performed by: NURSE PRACTITIONER

## 2022-12-13 PROCEDURE — 99284 EMERGENCY DEPT VISIT MOD MDM: CPT

## 2022-12-13 RX ORDER — IBUPROFEN 600 MG/1
600 TABLET ORAL EVERY 8 HOURS PRN
Qty: 20 TABLET | Refills: 0 | Status: SHIPPED | OUTPATIENT
Start: 2022-12-13

## 2022-12-13 RX ORDER — METHOCARBAMOL 500 MG/1
TABLET, FILM COATED ORAL
Qty: 20 TABLET | Refills: 0 | Status: SHIPPED | OUTPATIENT
Start: 2022-12-13

## 2022-12-13 NOTE — Clinical Note
"Azucena Marcumnice" [place] was seen and treated in our emergency department on 12/13/2022.  She may return to work on 12/15/2022.       If you have any questions or concerns, please don't hesitate to call.      Mick FINK RN    "

## 2022-12-13 NOTE — Clinical Note
"Azucena Knappe" [place] was seen and treated in our emergency department on 12/13/2022.  She may return to work on 12/14/2022.       If you have any questions or concerns, please don't hesitate to call.      Mick FINK RN    "

## 2022-12-13 NOTE — FIRST PROVIDER EVALUATION
" Emergency Department TeleTriage Encounter Note      CHIEF COMPLAINT    Chief Complaint   Patient presents with    Shoulder Pain     Patient to ED with c/o left shoulder pain since Saturday.        VITAL SIGNS   Initial Vitals [12/13/22 1354]   BP Pulse Resp Temp SpO2   138/82 66 16 98.1 °F (36.7 °C) 100 %      MAP       --            ALLERGIES    Review of patient's allergies indicates:  No Known Allergies    PROVIDER TRIAGE NOTE  This is a teletriage evaluation of a 37 y.o. female presenting to the ED with c/o left shoulder pain for "a while".  PCP referred to PT and has been going.  Pain worsened 4 days ago.  Denies new trauma or injury.  No medications taken PTA.  Limited physical exam via telehealth: The patient is awake, alert, answering questions appropriately and is not in respiratory distress. Initial orders will be placed and care will be transferred to an alternate provider when patient is roomed for a full evaluation. Any additional orders and the final disposition will be determined by that provider.     ORDERS  Labs Reviewed - No data to display    ED Orders (720h ago, onward)      Start Ordered     Status Ordering Provider    12/13/22 1407 12/13/22 1406  POCT urine pregnancy  Once         Ordered DORINA VERDE    12/13/22 1407 12/13/22 1406  X-Ray Shoulder 2 or More Views Left  1 time imaging         Ordered DORINA VERDE              Virtual Visit Note: The provider triage portion of this emergency department evaluation and documentation was performed via [employer], a HIPAA-compliant telemedicine application, in concert with a tele-presenter in the room. A face to face patient evaluation with one of my colleagues will occur once the patient is placed in an emergency department room.      DISCLAIMER: This note was prepared with [employer] voice recognition transcription software. Garbled syntax, mangled pronouns, and other bizarre constructions may be attributed to that software system.    "

## 2022-12-13 NOTE — ED PROVIDER NOTES
Encounter Date: 2022    SCRIBE #1 NOTE: I, Claudia Rossi, am scribing for, and in the presence of,  Jinny Escudero MD.     History     Chief Complaint   Patient presents with    Shoulder Pain     Patient to ED with c/o left shoulder pain since Saturday.      This is a 37 y.o. female with PMHx of HTN who presents with complaint of worsening left shoulder pain for the past three days. She notes that she uses her shoulder frequently at work, so she believes she injured it then. She reports that she has been receiving physical therapy for this issue but states her pain progressed three days ago. Then, the next day she noticed numbness in her left arm and hand. Her pain is exacerbated with movement. No other associated symptoms.      The history is provided by the patient and medical records.   Review of patient's allergies indicates:  No Known Allergies  Past Medical History:   Diagnosis Date    Abnormal Pap smear of cervix     Hypertension     Migraine headache     Obesity      Past Surgical History:   Procedure Laterality Date     SECTION N/A 3/10/2021    Procedure:  SECTION;  Surgeon: Joi Najera MD;  Location: [facility] L&D OR;  Service: OB/GYN;  Laterality: N/A;     SECTION      DIAGNOSTIC LAPAROSCOPY N/A 2022    Procedure: LAPAROSCOPY, DIAGNOSTIC and DIAGNOSTIC HYSTEROSCOPY;  Surgeon: Joi Najera MD;  Location: [facility] OR;  Service: OB/GYN;  Laterality: N/A;    [place] TREAT ECTOPIC PREG,NON REMVAL Right 7/12/15    Right salpingectomy and partial cornual resection     Family History   Problem Relation Age of Onset    Ovarian cancer Maternal Grandmother     Colon cancer Neg Hx     Breast cancer Neg Hx      Social History     Tobacco Use    Smoking status: Never    Smokeless tobacco: Never   Substance Use Topics    Alcohol use: Yes     Comment: social    Drug use: No     Review of Systems   Constitutional:  Negative for fever.   HENT:  Negative for sore throat.    Respiratory:   Negative for shortness of breath.    Cardiovascular:  Negative for chest pain.   Gastrointestinal:  Negative for nausea.   Genitourinary:  Negative for dysuria.   Musculoskeletal:  Negative for back pain.        Positive for left shoulder pain.   Skin:  Negative for rash.   Neurological:  Positive for numbness (LUE). Negative for weakness.   Hematological:  Does not bruise/bleed easily.     Physical Exam     Initial Vitals [12/13/22 1354]   BP Pulse Resp Temp SpO2   138/82 66 16 98.1 °F (36.7 °C) 100 %      MAP       --         Physical Exam    Nursing note and vitals reviewed.  Constitutional: She appears well-developed and well-nourished.   HENT:   Head: Normocephalic and atraumatic.   Neck: Neck supple.   Normal range of motion.  Pulmonary/Chest: Breath sounds normal.   Abdominal: Abdomen is soft.   Musculoskeletal:      Cervical back: Normal range of motion and neck supple.      Comments: No joint effusion, decreased range of motion due to pain with any range of motion.  Arm is neurovascularly intact.  The patient has tenderness to the shoulder joint anteriorly and posteriorly as well as to the trapezius muscle with palpation.     Neurological: She is alert and oriented to person, place, and time. No sensory deficit. GCS score is 15. GCS eye subscore is 4. GCS verbal subscore is 5. GCS motor subscore is 6.   Skin: Skin is warm and dry.   Psychiatric: She has a normal mood and affect. Her behavior is normal. Judgment and thought content normal.       ED Course   Procedures  Labs Reviewed   POCT URINE PREGNANCY          Imaging Results              X-Ray Shoulder 2 or More Views Left (Final result)  Result time 12/13/22 15:40:00      Final result by Erica Mendoza MD (12/13/22 15:40:00)                   Impression:      No acute osseous abnormality seen.      Electronically signed by: Erica Mendoza  Date:    12/13/2022  Time:    15:40               Narrative:    EXAMINATION:  XR SHOULDER COMPLETE 2 OR MORE  VIEWS LEFT    CLINICAL HISTORY:  left shoulder pain;    TECHNIQUE:  Two or three views of the left shoulder were performed.    COMPARISON:  None    FINDINGS:  No acute fracture or dislocation seen.  No significant soft tissue edema or radiopaque retained foreign body.                                       Medications - No data to display  Medical Decision Making:   Clinical Tests:   Lab Tests: Ordered and Reviewed  Radiological Study: Reviewed and Ordered        Scribe Attestation:   Scribe #1: I performed the above scribed service and the documentation accurately describes the services I performed. I attest to the accuracy of the note.            I, Jinny Escudero, personally performed the services described in this documentation. All medical record entries made by the scribe were at my direction and in my presence.  I have reviewed the chart and agree that the record reflects my personal performance and is accurate and complete. Jinny Escudero M.D. 4:27 PM12/13/2022      Clinical Impression:   Final diagnoses:  [M25.512] Acute pain of left shoulder (Primary)        ED Disposition Condition    Discharge Stable          ED Prescriptions       Medication Sig Dispense Start Date End Date Auth. Provider    ibuprofen (ADVIL,MOTRIN) 600 MG tablet Take 1 tablet (600 mg total) by mouth every 8 (eight) hours as needed for Pain. 20 tablet 12/13/2022 -- Jinny Escudero MD    methocarbamoL (ROBAXIN) 500 MG Tab Take 1-2 tablets as needed for spasms 20 tablet 12/13/2022 -- Jinny Escudero MD          Follow-up Information       Follow up With Specialties Details Why Contact Info    Nikita Trinidad MD Internal Medicine  As needed [address]  [facility] [address]  [phone]               Jinny Escudero MD  12/13/22 1394

## 2023-03-27 ENCOUNTER — TELEPHONE (OUTPATIENT)
Dept: OBSTETRICS AND GYNECOLOGY | Facility: CLINIC | Age: 38
End: 2023-03-27
Payer: MEDICAID

## 2023-03-27 NOTE — TELEPHONE ENCOUNTER
Pt will come in at scheduled time   ----- Message from Johanne Blackwood sent at 3/27/2023 10:00 AM CDT -----  Regarding: patient call back  Type: Patient Call Back    Who called: Self     What is the request in detail: IS asking for a later time for her apt for 3/30    Can the clinic reply by [facility]? No     Would the patient rather a call back or a response via [facility]? Call     Best call back number: .[phone]      Additional Information:

## 2023-03-30 ENCOUNTER — TELEPHONE (OUTPATIENT)
Dept: OBSTETRICS AND GYNECOLOGY | Facility: CLINIC | Age: 38
End: 2023-03-30
Payer: MEDICAID

## 2023-03-30 NOTE — TELEPHONE ENCOUNTER
Spoke with pt and she is unable to make apt on today. Pt is schedule to come in for annual apt with provider on 4/20/2023 at 1045 am      ----- Message from Torie Martin sent at 3/30/2023  1:20 PM CDT -----  Type: Patient Call Back    Who called: SELF     What is the request in detail: Pt askin for a call bk     Can the clinic reply by [facility]?    Would the patient rather a call back or a response via [facility]?     Best call back number: [phone] (home)       Additional Information:

## 2023-06-05 ENCOUNTER — HOSPITAL ENCOUNTER (EMERGENCY)
Facility: OTHER | Age: 38
Discharge: HOME | End: 2023-06-05
Attending: EMERGENCY MEDICINE
Payer: MEDICAID

## 2023-06-05 VITALS
BODY MASS INDEX: 34.96 KG/M2 | RESPIRATION RATE: 18 BRPM | HEART RATE: 85 BPM | HEIGHT: 62 IN | WEIGHT: 190 LBS | TEMPERATURE: 98 F | OXYGEN SATURATION: 95 % | SYSTOLIC BLOOD PRESSURE: 123 MMHG | DIASTOLIC BLOOD PRESSURE: 87 MMHG

## 2023-06-05 DIAGNOSIS — J06.9 UPPER RESPIRATORY TRACT INFECTION, UNSPECIFIED TYPE: ICD-10-CM

## 2023-06-05 DIAGNOSIS — B34.9 VIRAL SYNDROME: Primary | ICD-10-CM

## 2023-06-05 PROCEDURE — 63600175 PHARM REV CODE 636 W HCPCS: Performed by: NURSE PRACTITIONER

## 2023-06-05 PROCEDURE — 99284 EMERGENCY DEPT VISIT MOD MDM: CPT | Mod: 25

## 2023-06-05 PROCEDURE — 81025 URINE PREGNANCY TEST: CPT | Performed by: NURSE PRACTITIONER

## 2023-06-05 PROCEDURE — 96372 THER/PROPH/DIAG INJ SC/IM: CPT | Performed by: NURSE PRACTITIONER

## 2023-06-05 RX ORDER — DEXAMETHASONE SODIUM PHOSPHATE 4 MG/ML
8 INJECTION, SOLUTION INTRA-ARTICULAR; INTRALESIONAL; INTRAMUSCULAR; INTRAVENOUS; SOFT TISSUE
Status: COMPLETED | OUTPATIENT
Start: 2023-06-05 | End: 2023-06-05

## 2023-06-05 RX ORDER — BENZONATATE 100 MG/1
100 CAPSULE ORAL 3 TIMES DAILY PRN
Qty: 20 CAPSULE | Refills: 0 | Status: SHIPPED | OUTPATIENT
Start: 2023-06-05 | End: 2023-06-15

## 2023-06-05 RX ORDER — IBUPROFEN 600 MG/1
600 TABLET ORAL EVERY 6 HOURS PRN
Qty: 20 TABLET | Refills: 0 | Status: SHIPPED | OUTPATIENT
Start: 2023-06-05

## 2023-06-05 RX ORDER — GUAIFENESIN 600 MG/1
600 TABLET, EXTENDED RELEASE ORAL 2 TIMES DAILY
Qty: 20 TABLET | Refills: 0 | Status: SHIPPED | OUTPATIENT
Start: 2023-06-05 | End: 2023-06-15

## 2023-06-05 RX ADMIN — DEXAMETHASONE SODIUM PHOSPHATE 8 MG: 4 INJECTION INTRA-ARTICULAR; INTRALESIONAL; INTRAMUSCULAR; INTRAVENOUS; SOFT TISSUE at 10:06

## 2023-06-05 NOTE — ED TRIAGE NOTES
Subjective fever with body aches, chills for past 2 days. Sinus congestion and pressure. No cough. Presents in no distress.

## 2023-06-05 NOTE — FIRST PROVIDER EVALUATION
Emergency Department TeleTriage Encounter Note      CHIEF COMPLAINT    Chief Complaint   Patient presents with    General Illness     C/o HA, body aches, subjective fever/chills/night sweats that began on Saturday. -CP. -SOB. VSS       VITAL SIGNS   Initial Vitals [06/05/23 0925]   BP Pulse Resp Temp SpO2   109/78 92 18 98.5 °F (36.9 °C) 98 %      MAP       --            ALLERGIES    Review of patient's allergies indicates:  No Known Allergies    PROVIDER TRIAGE NOTE  This is a teletriage evaluation of a 38 y.o. female presenting to the ED complaining of headache, chills, body aches, and night sweats since Saturday. Denies fever, n/v/d.     Likely viral. Pt is well-appearing, no distress.     Initial orders will be placed and care will be transferred to an alternate provider when patient is roomed for a full evaluation. Any additional orders and the final disposition will be determined by that provider.         ORDERS  Labs Reviewed - No data to display    ED Orders (720h ago, onward)      Start Ordered     Status Ordering Provider    06/05/23 0954 06/05/23 0953  POCT Influenza A/B Molecular  Once         Ordered ANAY QUINTANA N.    06/05/23 0954 06/05/23 0953  POCT urine pregnancy  Once         Ordered ANAY QUINTANA.    06/05/23 0953 06/05/23 0953  POCT COVID-19 Rapid Screening  Once         Ordered ANAY QUINTANA              Virtual Visit Note: The provider triage portion of this emergency department evaluation and documentation was performed via [employer], a HIPAA-compliant telemedicine application, in concert with a tele-presenter in the room. A face to face patient evaluation with one of my colleagues will occur once the patient is placed in an emergency department room.      DISCLAIMER: This note was prepared with [employer] voice recognition transcription software. Garbled syntax, mangled pronouns, and other bizarre constructions may be attributed to that software system.

## 2023-06-05 NOTE — Clinical Note
"Azucena "Cindy Magana was seen and treated in our emergency department on 6/5/2023.  She may return to work on 06/08/2023.       If you have any questions or concerns, please don't hesitate to call.      JANNIE Parr"

## 2023-06-05 NOTE — ED PROVIDER NOTES
Source of History:  Patient    Chief complaint:  General Illness (C/o HA, body aches, subjective fever/chills/night sweats that began on Saturday. -CP. -SOB. VSS)      HPI:  Azucena Magana is a 38 y.o. female presenting with complaint of headache, body aches, pressure behind her eyes and subjective fever/chills that began on Saturday.  Reports felt worse this morning which prompted her presents emergency department.  She denies any cough.  Denies any chest pain or shortness of breath.  States compliant with her high blood pressure medications.    This is the extent to the patients complaints today here in the emergency department.    PMH:  As per HPI and below:  Past Medical History:   Diagnosis Date    Abnormal Pap smear of cervix     Hypertension     Migraine headache     Obesity      Past Surgical History:   Procedure Laterality Date     SECTION N/A 3/10/2021    Procedure:  SECTION;  Surgeon: Joi Najera MD;  Location: [facility] L&D OR;  Service: OB/GYN;  Laterality: N/A;     SECTION      DIAGNOSTIC LAPAROSCOPY N/A 2022    Procedure: LAPAROSCOPY, DIAGNOSTIC and DIAGNOSTIC HYSTEROSCOPY;  Surgeon: Joi Najera MD;  Location: [facility] OR;  Service: OB/GYN;  Laterality: N/A;    UT TREAT ECTOPIC PREG,NON REMVAL Right 7/12/15    Right salpingectomy and partial cornual resection       Social History     Tobacco Use    Smoking status: Never    Smokeless tobacco: Never   Substance Use Topics    Alcohol use: Yes     Comment: social    Drug use: No     Review of patient's allergies indicates:  No Known Allergies    ROS: As per HPI and below:  General:  Subjective fever/chills, body aches.  Eyes: No visual changes.  ENT:  Congestion  Head:  headache.    Chest:  No shortness of breath.  Cardiovascular: No chest pain.  Abdomen: No abdominal pain.  No nausea or vomiting.   Genito-Urinary: No abnormal urination.  Neurologic: No focal weakness.  No numbness.  MSK: no back pain.  Integument: No  "rashes or lesions.  Hematologic: No easy bruising.  Endocrine: No excessive thirst or urination.    Physical Exam:    /87 (BP Location: Right arm, Patient Position: Sitting)   Pulse 85   Temp 97.9 °F (36.6 °C) (Oral)   Resp 18   Ht 5' 2" (1.575 m)   Wt 86.2 kg (190 lb)   SpO2 95%   BMI 34.75 kg/m²   Vitals:    06/05/23 0925 06/05/23 1036   BP: 109/78 123/87   Pulse: 92 85   Resp: 18 18   Temp: 98.5 °F (36.9 °C) 97.9 °F (36.6 °C)   TempSrc: Oral Oral   SpO2: 98% 95%   Weight: 86.2 kg (190 lb)    Height: 5' 2" (1.575 m)        Nursing note and vital signs reviewed.  Appearance: No acute distress.  Well-appearing, nontoxic  Eyes:  No conjunctival injection.  Extraocular muscles are intact.  ENT: Oropharynx erythema. No tonsillar exudate or swelling. Uvula midline and normal. No elevation of posterior oropharynx.  MM are pink and moist.   Bilateral TM's are pearly grey.  Lymph: No cervical lymphadenopathy.   Chest/ Respiratory:  Clear to auscultation bilaterally.  Good air movement.  No wheezes.  No rhonchi. No rales. No accessory muscle use.  Cardiovascular:  Regular rate and rhythm.  No murmurs. No gallops. No rubs.  Musculoskeletal: Neck supple.  No meningismus.  Skin: No rashes seen.  Good turgor.  No abrasions.  No ecchymoses.  Neuro: alert and oriented x3,  no focal neurological deficits.  Psych: Appropriate, conversant    Initial MDM:  38-year-old with URI symptoms began on Saturday.  Reports felt worse this a.m. which prompted her presents emergency department.  Will obtain COVID, flu and chest x-ray.    Labs that have been ordered have been independently reviewed and interpreted by myself.  Labs Reviewed   SARS-COV-2 RDRP GENE   POCT INFLUENZA A/B MOLECULAR   POCT URINE PREGNANCY       X-Ray Chest 1 View   Final Result      Please see above         Electronically signed by: Frankie Tristan DO   Date:    06/05/2023   Time:    10:17            Initial Impression/ Differential Dx:  Differential " Diagnosis includes, but is not limited to:  meningitis, nasal foreign body, otitis media/external, bacterial sinusitis, allergic rhinitis, influenza, bacterial/viral pharyngitis, bacterial/viral pneumonia, covid-19      MDM:    38 y.o. female with URI symptoms that began on Saturday.  Negative COVID, flu and chest x-ray in the emergency department.  This is likely viral in origin.  Vitals are stable.  Will discharge home with symptomatic control.  Work note was provided.  I did discuss she develops any worsening symptoms including fever, chest pain, shortness breath or any other concerns she can return to emergency department for re-evaluation.  She stated understanding.         Diagnostic Impression:    1. Viral syndrome    2. Upper respiratory tract infection, unspecified type         ED Disposition Condition    Discharge Stable            ED Prescriptions       Medication Sig Dispense Start Date End Date Auth. Provider    ibuprofen (ADVIL,MOTRIN) 600 MG tablet Take 1 tablet (600 mg total) by mouth every 6 (six) hours as needed for Pain. 20 tablet 6/5/2023 -- JANNIE Parr    benzonatate (TESSALON) 100 MG capsule Take 1 capsule (100 mg total) by mouth 3 (three) times daily as needed for Cough. 20 capsule 6/5/2023 6/15/2023 JANNIE Parr    guaiFENesin (MUCINEX) 600 mg 12 hr tablet Take 1 tablet (600 mg total) by mouth 2 (two) times daily. for 10 days 20 tablet 6/5/2023 6/15/2023 JANNIE Parr          Follow-up Information       Follow up With Specialties Details Why Contact Info    Nikita Trinidad MD Internal Medicine Schedule an appointment as soon as possible for a visit in 3 days  [address]   [address]  [facility] [address]  [phone]      [facility] Emergency Dept Emergency Medicine Go to  If symptoms worsen [address] [id number] [phone]                 JANNIE Parr  06/05/23 9654

## 2023-06-07 ENCOUNTER — PATIENT OUTREACH (OUTPATIENT)
Dept: EMERGENCY MEDICINE | Facility: OTHER | Age: 38
End: 2023-06-07
Payer: MEDICAID

## 2023-06-07 NOTE — PROGRESS NOTES
Malia Masterson LPN  ED Navigator  Emergency Department    [place] [facility] [place]  Role: Community Health Worker    Date: 06/07/2023  Patient Name: Azucena Magana  MRN: [id number]  PCP: Nikita Trinidad MD    Assessment:     Azucena Magana is a 38 y.o. female who has presented to ED for General Illness. Patient has visited the ED 1 times in the past 3 months. Patient did not contact PCP.     ED Navigator Initial Assessment    ED Navigator Enrollment Documentation  Consent to Services  Does patient consent to completing the assessment?: Yes  Contact  Method of Initial Contact: Phone  Transportation  Does the patient have issues with Transportation?: No  Does the patient have transportation to and from healthcare appointments?: Yes  Insurance Coverage  Do you have coverage/adequate coverage?: Yes  Type/kind of coverage: [place] [employer]  Is patient able to afford co-pays/deductibles?: Yes  Is patient able to afford HME or supplies?: Yes  Does patient have an established [facility] PCP?: Yes  Able to access?: Yes  Does the patient have a lack of adequate coverage?: No  Specialist Appointment  Did the patient come to the ED to see a specialist?: No  Does the patient have a pending specialist referral?: No  Does the patient have a specialist appointment made?: No  PCP Follow Up Appointment  Has the patient had an appointment with a primary care provider in the past year?: Yes  Approximate date: 11/21/22  Provider: Nikita Trinidad MD  Does the patient have a follow up appontment with a PCP?: No  When was the last time you saw your PCP?: 11/21/22  Why does the patient not have a follow up scheduled?: Other (see comments) (Comment: Not scheduled yet, declined scheduling assistance)  Medications  Is patient able to afford medication?: Yes  Is patient unable to get medication due to lack of transportation?: No  Psychological  Does the patient have psycho-social concerns?: Yes  What concerns does the patient  have?: Anxiety and/or Depression  Food  Does the patient have concerns about food?: No  Communication/Education  Does the patient have limited English proficiency/English not primary language?: No  Does patient have low literacy and/or low health literacy?: Yes  Does patient have concerns with care?: No  Does patient have dissatisfaction with care?: No  Other Financial Concerns  Does the patient have immediate financial distress?: No  Does the patient have general financial concerns?: No  Other Social Barriers/Concerns  Does the patient have any additional barriers or concerns?: None  Primary Barrier  Barriers identified: Cognitive barrier (health literacy, language and communication, etc.)  Root Cause of ED Utilization: Patient Knowledge/Low Health Literacy  Plan to address Patient Knowledge/Low Health Literacy: Provided information for [facility] 24/7 Nurse triage line [phone] or [facility] ([phone])  Next steps: Provided Education  Was education/educational materials provided surrounding PCP services/creating a medical home?: Yes Was education verbal or written?: Written     Was education/educational materials provided surrounding low cost, healthy foods?: Yes Was education verbal or written?: Written     Was education/educational materials provided surrounding other items? If so, use comment to explain.: Yes (Comment: Behavioral health resources) Was education verbal or written?: Written   Plan: Provided information for [facility] 24/7 Nurse triage line, [phone] or 1-[facility] ([phone])  Expected Date of Follow Up 1: 8/29/23  Additional Documentation: Spoke with patient today and she was agreeable to enrollment and subsequent F/U calls. Pt. Would like behavioral health resources. Pt. Is established with Dr. Trinidad and declined scheduling assistance needs to make a F/U appointment. Pt. Denies smoking. Pt. Drinks only on occasion socially. Pt. Denied the need for any outside  community resources at this time. [facility] form, [place] Virtual Visit Flyer, [facility] PCP scheduling assistance, [facility] on call RN#, and Heart Healthy Diet education all sent to e-mail.         Social History     Socioeconomic History    Marital status: Single   Tobacco Use    Smoking status: Never    Smokeless tobacco: Never   Substance and Sexual Activity    Alcohol use: Yes     Comment: social    Drug use: No    Sexual activity: Yes     Partners: Male     Birth control/protection: I.U.D.     Social Determinants of Health     Financial Resource Strain: Low Risk     Difficulty of Paying Living Expenses: Not hard at all   Food Insecurity: No Food Insecurity    Worried About Running Out of Food in the Last Year: Never true    Ran Out of Food in the Last Year: Never true   Transportation Needs: No Transportation Needs    Lack of Transportation (Medical): No    Lack of Transportation (Non-Medical): No   Stress: Stress Concern Present    Feeling of Stress : To some extent   Social Connections: Unknown    Marital Status: Never    Housing Stability: Unknown    Unable to Pay for Housing in the Last Year: No    Unstable Housing in the Last Year: No       Plan:   Spoke with patient today and she was agreeable to enrollment and subsequent F/U calls. Pt. Would like behavioral health resources. Pt. Is established with Dr. Trinidad and declined scheduling assistance needs to make a F/U appointment. Pt. Denies smoking. Pt. Drinks only on occasion socially. Pt. Denied the need for any outside community resources at this time. [facility] form, [place] Virtual Visit Flyer, [facility] PCP scheduling assistance, [facility] on call RN#, and Heart Healthy Diet education all sent to e-mail.

## 2023-08-29 ENCOUNTER — PATIENT OUTREACH (OUTPATIENT)
Dept: EMERGENCY MEDICINE | Facility: OTHER | Age: 38
End: 2023-08-29
Payer: MEDICAID

## 2023-10-07 ENCOUNTER — HOSPITAL ENCOUNTER (EMERGENCY)
Facility: OTHER | Age: 38
Discharge: HOME | End: 2023-10-07
Attending: INTERNAL MEDICINE
Payer: MEDICAID

## 2023-10-07 VITALS
RESPIRATION RATE: 18 BRPM | HEIGHT: 63 IN | HEART RATE: 68 BPM | DIASTOLIC BLOOD PRESSURE: 103 MMHG | WEIGHT: 198 LBS | SYSTOLIC BLOOD PRESSURE: 149 MMHG | BODY MASS INDEX: 35.08 KG/M2 | OXYGEN SATURATION: 100 % | TEMPERATURE: 98 F

## 2023-10-07 DIAGNOSIS — N76.0 BV (BACTERIAL VAGINOSIS): ICD-10-CM

## 2023-10-07 DIAGNOSIS — R51.9 ACUTE NONINTRACTABLE HEADACHE, UNSPECIFIED HEADACHE TYPE: Primary | ICD-10-CM

## 2023-10-07 DIAGNOSIS — B96.89 BV (BACTERIAL VAGINOSIS): ICD-10-CM

## 2023-10-07 LAB
B-HCG UR QL: NEGATIVE
BACTERIA GENITAL QL WET PREP: ABNORMAL
BILIRUB UR QL STRIP: NEGATIVE
CLARITY UR: CLEAR
CLUE CELLS VAG QL WET PREP: ABNORMAL
COLOR UR: YELLOW
CTP QC/QA: YES
FILAMENT FUNGI VAG WET PREP-#/AREA: ABNORMAL
GLUCOSE UR QL STRIP: NEGATIVE
HGB UR QL STRIP: NEGATIVE
KETONES UR QL STRIP: NEGATIVE
LEUKOCYTE ESTERASE UR QL STRIP: NEGATIVE
NITRITE UR QL STRIP: NEGATIVE
PH UR STRIP: 7 [PH] (ref 5–8)
PROT UR QL STRIP: NEGATIVE
SP GR UR STRIP: 1.01 (ref 1–1.03)
SPECIMEN SOURCE: ABNORMAL
T VAGINALIS GENITAL QL WET PREP: ABNORMAL
URN SPEC COLLECT METH UR: NORMAL
UROBILINOGEN UR STRIP-ACNC: NEGATIVE EU/DL
WBC #/AREA VAG WET PREP: ABNORMAL
YEAST GENITAL QL WET PREP: ABNORMAL

## 2023-10-07 PROCEDURE — 81003 URINALYSIS AUTO W/O SCOPE: CPT | Performed by: INTERNAL MEDICINE

## 2023-10-07 PROCEDURE — 96374 THER/PROPH/DIAG INJ IV PUSH: CPT

## 2023-10-07 PROCEDURE — 25000003 PHARM REV CODE 250: Performed by: INTERNAL MEDICINE

## 2023-10-07 PROCEDURE — 87591 N.GONORRHOEAE DNA AMP PROB: CPT | Performed by: INTERNAL MEDICINE

## 2023-10-07 PROCEDURE — 87491 CHLMYD TRACH DNA AMP PROBE: CPT | Performed by: INTERNAL MEDICINE

## 2023-10-07 PROCEDURE — 87210 SMEAR WET MOUNT SALINE/INK: CPT | Performed by: INTERNAL MEDICINE

## 2023-10-07 PROCEDURE — 81025 URINE PREGNANCY TEST: CPT | Performed by: INTERNAL MEDICINE

## 2023-10-07 PROCEDURE — 63600175 PHARM REV CODE 636 W HCPCS: Performed by: INTERNAL MEDICINE

## 2023-10-07 PROCEDURE — 99284 EMERGENCY DEPT VISIT MOD MDM: CPT | Mod: 25

## 2023-10-07 RX ORDER — KETOROLAC TROMETHAMINE 30 MG/ML
10 INJECTION, SOLUTION INTRAMUSCULAR; INTRAVENOUS
Status: COMPLETED | OUTPATIENT
Start: 2023-10-07 | End: 2023-10-07

## 2023-10-07 RX ORDER — METRONIDAZOLE 500 MG/1
500 TABLET ORAL EVERY 12 HOURS
Qty: 14 TABLET | Refills: 0 | Status: SHIPPED | OUTPATIENT
Start: 2023-10-07 | End: 2023-10-14

## 2023-10-07 RX ORDER — ACETAMINOPHEN 500 MG
1000 TABLET ORAL
Status: COMPLETED | OUTPATIENT
Start: 2023-10-07 | End: 2023-10-07

## 2023-10-07 RX ORDER — METRONIDAZOLE 500 MG/1
500 TABLET ORAL
Status: COMPLETED | OUTPATIENT
Start: 2023-10-07 | End: 2023-10-07

## 2023-10-07 RX ADMIN — ACETAMINOPHEN 1000 MG: 500 TABLET ORAL at 03:10

## 2023-10-07 RX ADMIN — METRONIDAZOLE 500 MG: 500 TABLET ORAL at 04:10

## 2023-10-07 RX ADMIN — SODIUM CHLORIDE 1000 ML: 9 INJECTION, SOLUTION INTRAVENOUS at 03:10

## 2023-10-07 RX ADMIN — KETOROLAC TROMETHAMINE 10 MG: 30 INJECTION, SOLUTION INTRAMUSCULAR at 03:10

## 2023-10-07 NOTE — ED TRIAGE NOTES
"Pt c/o of HA for the past few days,but is more concerned about the earlier vaginal discharge and odor. Pt states her period began two weeks ago Wed and ended Sun, that was when she noticed a discharge and strange/foul odor. She tried a home remedy of apple cider vinegar and baking soda to get rid of the symptoms, which appeared to cure it. She states she just wants to "make sure everything is okay down there." Pt states she had a prev hx of BV over a year ago.  "

## 2023-10-07 NOTE — DISCHARGE INSTRUCTIONS
Diagnosis:   1. Acute nonintractable headache, unspecified headache type    2. BV (bacterial vaginosis)        Tests you had showed:   Labs Reviewed   VAGINAL SCREEN - Abnormal; Notable for the following components:       Result Value    Clue Cells Occasional (*)     Bacteria - Vaginal Screen Occasional (*)     All other components within normal limits    Narrative:     Release to patient->Immediate   C. TRACHOMATIS/N. GONORRHOEAE BY AMP DNA   URINALYSIS, REFLEX TO URINE CULTURE    Narrative:     Specimen Source->Urine   POCT URINE PREGNANCY      No orders to display       Treatments you received were:   Medications   sodium chloride 0.9% bolus 1,000 mL 1,000 mL (1,000 mLs Intravenous New Bag 10/7/23 0323)   metroNIDAZOLE tablet 500 mg (has no administration in time range)   acetaminophen tablet 1,000 mg (1,000 mg Oral Given 10/7/23 0322)   ketorolac injection 9.999 mg (9.999 mg Intravenous Given 10/7/23 0320)       Home Care Instructions:  - Medications: Continue taking your home medications as prescribed    Follow-Up Plan:  - Follow-up with: Primary care doctor within 1-2  days  - Additional testing and/or evaluation will be directed by your primary doctor    Return to the Emergency Department for symptoms including but not limited to: worsening symptoms, severe back pain, shortness of breath or chest pain, vomiting with inability to hold down fluids, blood in vomit or poop, fevers greater than 100.4°F, passing out/fainting/unconsciousness, or other concerning symptoms.     No future appointments.

## 2023-10-07 NOTE — ED PROVIDER NOTES
Encounter date: 2:49 AM 10/07/2023    Source of History   Patient     Chief Complaint   Pt presents with:   Headache (Reports HA for several days. . Also reports clear vaginal discharge with odor. Denies vision changes, nausea, recent trauma, dysuria, feversHx of HTN)      History Of Present Illness   Azucena Magana is a 38 y.o. female with history of morbid obesity, Nexplanon who presents to the ED with chief complaint of headache x1 week and vaginal discharge with odor.  Patient states that she is had a headache for the last week.  She states that she usually gets headaches.  She describes this as pressure on the back of the head right greater than left.  She states that she is had a daily headache for the last 7 days.  The headache will come and go.  She denies any thunderclap onset, focal neurologic deficits, head trauma, chest pain shortness of breath, nausea or dysuria.  Patient notes that she is been compliant with her high blood pressure medication.  She  has been started on 2 weight loss medications in the last 2 weeks.  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  Has not tried tylenol and Motrin before arrival in the ED.    Denies: chest pain shortness of breath, no thundercalp unset, falls or trauma, neck or back pain, dysuria   This is the extent to the patients complaints today here in the emergency department.  Past History   Review of patient's allergies indicates:  No Known Allergies    No current facility-administered medications on file prior to encounter.     Current Outpatient Medications on File Prior to Encounter   Medication Sig Dispense Refill    buPROPion (WELLBUTRIN XL) 150 MG TB24 tablet Take 1 tablet (150 mg total) by mouth every morning. 30 tablet 5    hydroCHLOROthiazide (HYDRODIURIL) 25 MG tablet Take 1 tablet (25 mg total) by mouth once daily. 90 tablet 0    ibuprofen (ADVIL,MOTRIN) 600 MG tablet Take 1 tablet (600 mg total) by mouth every 8  (eight) hours as needed for Pain. 20 tablet 0    ibuprofen (ADVIL,MOTRIN) 600 MG tablet Take 1 tablet (600 mg total) by mouth every 6 (six) hours as needed for Pain. 20 tablet 0    methocarbamoL (ROBAXIN) 500 MG Tab Take 1-2 tablets as needed for spasms 20 tablet 0    NIFEdipine (PROCARDIA-XL) 30 MG (OSM) 24 hr tablet TAKE 1 TABLET(30 MG) BY MOUTH EVERY DAY 30 tablet 0    oxyCODONE-acetaminophen (PERCOCET) 5-325 mg per tablet Take 1 tablet by mouth every 4 (four) hours as needed for Pain. (Patient not taking: Reported on 2022) 20 tablet 0    [DISCONTINUED] labetaloL (NORMODYNE) 200 MG tablet Take 1 tablet (200 mg total) by mouth every 12 (twelve) hours. 60 tablet 11       As per HPI and below:  Past Medical History:   Diagnosis Date    Abnormal Pap smear of cervix     Hypertension     Migraine headache     Obesity      Past Surgical History:   Procedure Laterality Date     SECTION N/A 3/10/2021    Procedure:  SECTION;  Surgeon: Joi Najera MD;  Location: [facility] L&D OR;  Service: OB/GYN;  Laterality: N/A;     SECTION      DIAGNOSTIC LAPAROSCOPY N/A 2022    Procedure: LAPAROSCOPY, DIAGNOSTIC and DIAGNOSTIC HYSTEROSCOPY;  Surgeon: Joi Najera MD;  Location: [facility] OR;  Service: OB/GYN;  Laterality: N/A;    [place] TREAT ECTOPIC PREG,NON REMVAL Right 7/12/15    Right salpingectomy and partial cornual resection       Social History     Socioeconomic History    Marital status: Single   Tobacco Use    Smoking status: Never    Smokeless tobacco: Never   Substance and Sexual Activity    Alcohol use: Yes     Comment: social    Drug use: No    Sexual activity: Yes     Partners: Male     Birth control/protection: I.U.D.     Social Determinants of Health     Financial Resource Strain: Low Risk  (2023)    Overall Financial Resource Strain (CARDIA)     Difficulty of Paying Living Expenses: Not hard at all   Food Insecurity: No Food Insecurity (2023)    Hunger Vital Sign     Worried About  "Running Out of Food in the Last Year: Never true     Ran Out of Food in the Last Year: Never true   Transportation Needs: No Transportation Needs (6/7/2023)    PRAPARE - Transportation     Lack of Transportation (Medical): No     Lack of Transportation (Non-Medical): No   Stress: Stress Concern Present (6/7/2023)    [employer] - Occupational Stress Questionnaire     Feeling of Stress : To some extent   Social Connections: Unknown (6/7/2023)    Social Connection and Isolation Panel [NHANES]     Marital Status: Never    Housing Stability: Unknown (6/7/2023)    Housing Stability Vital Sign     Unable to Pay for Housing in the Last Year: No     Unstable Housing in the Last Year: No       Family History   Problem Relation Age of Onset    Ovarian cancer Maternal Grandmother     Colon cancer Neg Hx     Breast cancer Neg Hx        Physical Exam     Vitals:    10/07/23 0227 10/07/23 0400   BP: (!) 142/93 (!) 149/103   Pulse: 87 68   Resp: 18 18   Temp: 98.1 °F (36.7 °C) 98.2 °F (36.8 °C)   TempSrc: Oral Oral   SpO2: 100% 100%   Weight: 89.8 kg (198 lb)    Height: 5' 3" (1.6 m)      Physical Exam:   Nursing note and vitals reviewed.  Appearance:  Well-appearing spinal nontoxic adult female in no acute respiratory distress.  Making purposeful movements.  Speaking in full sentences.  Skin: No obvious rashes seen.  Good turgor.  No abrasions.  No ecchymoses.  Eyes: No conjunctival injection. EOMI, PERRL.  Head: NC/AT  ENT: Oropharynx clear.    Chest: CTAB. No increased work of breathing, bilateral chest rise.  Cardiovascular: Regular rate and rhythm.  Normal equal bilateral radial pulses.  Abdomen: Soft.  Not distended.  Nontender.  No guarding.  No rebound. No Masses  Musculoskeletal: Good range of motion all joints.  No deformities.  Neck supple, full range of motion, no obvious deformity.  No tenderness to palpation of cervical through lumbar spine.  No step-offs or " deformities.  Neurologic: Moves all extremities and carries on conversation. CN- II: PERRL; III/IV/VI: EOMI w/out evidence of nystagmus; V: no deficits appreciated to light touch bilateral face; VII: no facial weakness, no facial asymmetry. Eyebrow raise symmetric. Smile symmetric; IX/X: palate midline, and raises symmetrically; XI: shoulder shrug 5/5 bilaterally; XII: tongue is midline w/out asymmetry. Strength 5/5 to bilateral upper and lower extremities, sensation intact to light touch. Gait normal.  Mental Status:  Alert and oriented x 3.  Appropriate, conversant.      Results and Medications    Procedures    Labs Reviewed   VAGINAL SCREEN - Abnormal; Notable for the following components:       Result Value    Clue Cells Occasional (*)     Bacteria - Vaginal Screen Occasional (*)     All other components within normal limits    Narrative:     Release to patient->Immediate   C. TRACHOMATIS/N. GONORRHOEAE BY AMP DNA   URINALYSIS, REFLEX TO URINE CULTURE    Narrative:     Specimen Source->Urine   POCT URINE PREGNANCY       Imaging Results    None             Medications   acetaminophen tablet 1,000 mg (1,000 mg Oral Given 10/7/23 0322)   sodium chloride 0.9% bolus 1,000 mL 1,000 mL (1,000 mLs Intravenous New Bag 10/7/23 0323)   ketorolac injection 9.999 mg (9.999 mg Intravenous Given 10/7/23 0320)   metroNIDAZOLE tablet 500 mg (500 mg Oral Given 10/7/23 0404)       MDM, Impression and Plan   Clinical Tests:   Lab Tests: Ordered and Reviewed  Radiological Study: Ordered and Reviewed  Medical Tests: Ordered and Reviewed    Differential diagnosis:  -unlikely ICH based off of complaints chronicity without thunderclap onset   -unlikely CVA without neurologic deficits   -dehydration  - complex migraine   -UTI   -sexually transmitted infection such as gonorrhea versus chlamydia   -BV verses trichomoniasis    Initial Assessment & ED Management:    Urgent evaluation of 38 y.o. female with History as above who presents the  ED with chief complaint of headache x1 week and vaginal discharge.  On arrival to the ED she is noted to be afebrile, hemodynamically stable in no acute respiratory distress.  Her pain of her head resolved with IV Toradol Tylenol and a L of fluids.  She declined a pelvic exam thus tests were sent off urine.  Urinalysis not consistent with UTI.  Urine pregnancy negative.  G/C pending.  Vaginal screen noted for clue cells concerning for bacterial vaginosis.  Patient was discharged on metronidazole.  Warned of disulfiram reaction.  On reassessment she has no ongoing complaints.  Care plan addressed with patient and all those present. All questions answered.  Strict return precautions discussed.  Patient was instructed on the correct follow-up time and route.  They voiced verbal understanding and agreement  with the plan and were deemed stable for discharge.       Medical Decision Making  Amount and/or Complexity of Data Reviewed  Labs: ordered.    Risk  OTC drugs.  Prescription drug management.           Please see ED course for discussion of workup and dispo if not listed above.          Final diagnoses:  [R51.9] Acute nonintractable headache, unspecified headache type (Primary)  [N76.0, B96.89] BV (bacterial vaginosis)        ED Disposition Condition    Discharge Stable          ED Prescriptions       Medication Sig Dispense Start Date End Date Auth. Provider    metroNIDAZOLE (FLAGYL) 500 MG tablet Take 1 tablet (500 mg total) by mouth every 12 (twelve) hours. for 7 days 14 tablet 10/7/2023 10/14/2023 Nhan Espinoza MD          Follow-up Information       Follow up With Specialties Details Why Contact Info    Nikita Trinidad MD Internal Medicine In 2 days If symptoms worsen [address]   [address]  [facility] [address]  [phone]      [facility] Emergency Dept Emergency Medicine  If symptoms worsen [address] [facility] [id number] [phone]                          Nhan Espinoza MD           Nhan Espinoza MD  10/07/23 0918

## 2023-10-09 LAB
C TRACH DNA SPEC QL NAA+PROBE: NOT DETECTED
N GONORRHOEA DNA SPEC QL NAA+PROBE: NOT DETECTED

## 2024-01-02 ENCOUNTER — HOSPITAL ENCOUNTER (EMERGENCY)
Facility: HOSPITAL | Age: 39
Discharge: HOME | End: 2024-01-02
Attending: EMERGENCY MEDICINE
Payer: COMMERCIAL

## 2024-01-02 VITALS
OXYGEN SATURATION: 100 % | HEART RATE: 78 BPM | TEMPERATURE: 98 F | RESPIRATION RATE: 16 BRPM | HEIGHT: 63 IN | DIASTOLIC BLOOD PRESSURE: 98 MMHG | BODY MASS INDEX: 34.91 KG/M2 | SYSTOLIC BLOOD PRESSURE: 170 MMHG | WEIGHT: 197 LBS

## 2024-01-02 DIAGNOSIS — B34.9 VIRAL SYNDROME: Primary | ICD-10-CM

## 2024-01-02 LAB
B-HCG UR QL: NEGATIVE
CTP QC/QA: YES
INFLUENZA A, MOLECULAR: NEGATIVE
INFLUENZA B, MOLECULAR: NEGATIVE
SARS-COV-2 RDRP RESP QL NAA+PROBE: NEGATIVE
SPECIMEN SOURCE: NORMAL

## 2024-01-02 PROCEDURE — 87502 INFLUENZA DNA AMP PROBE: CPT | Performed by: NURSE PRACTITIONER

## 2024-01-02 PROCEDURE — 81025 URINE PREGNANCY TEST: CPT | Performed by: NURSE PRACTITIONER

## 2024-01-02 PROCEDURE — 99282 EMERGENCY DEPT VISIT SF MDM: CPT

## 2024-01-02 PROCEDURE — U0002 COVID-19 LAB TEST NON-CDC: HCPCS | Performed by: NURSE PRACTITIONER

## 2024-01-02 NOTE — ED PROVIDER NOTES
Encounter Date: 2024       History     Chief Complaint   Patient presents with    Generalized Body Aches    Chills     Azucena Magana is a 38-year-old female past medical history of hypertension presenting today for generalized body aches and chills.  States she has been feeling unwell since yesterday.  Both her kids are sick at home.  She brought them to the doctor and they both tested negative for COVID and flu.  She reports nasal congestion, mild cough, generalized body aches and fever T-max 102°.  She has been taking Tylenol.  She is here for testing.  Denies abdominal pain, dysuria, hematuria      Review of patient's allergies indicates:  No Known Allergies  Past Medical History:   Diagnosis Date    Abnormal Pap smear of cervix     Hypertension     Migraine headache     Obesity      Past Surgical History:   Procedure Laterality Date     SECTION N/A 3/10/2021    Procedure:  SECTION;  Surgeon: Joi Najera MD;  Location: [facility] L&D OR;  Service: OB/GYN;  Laterality: N/A;     SECTION      DIAGNOSTIC LAPAROSCOPY N/A 2022    Procedure: LAPAROSCOPY, DIAGNOSTIC and DIAGNOSTIC HYSTEROSCOPY;  Surgeon: Joi Najera MD;  Location: [facility] OR;  Service: OB/GYN;  Laterality: N/A;    [place] TREAT ECTOPIC PREG,NON REMVAL Right 7/12/15    Right salpingectomy and partial cornual resection     Family History   Problem Relation Age of Onset    Ovarian cancer Maternal Grandmother     Colon cancer Neg Hx     Breast cancer Neg Hx      Social History     Tobacco Use    Smoking status: Never    Smokeless tobacco: Never   Substance Use Topics    Alcohol use: Yes     Comment: social    Drug use: No     Review of Systems    Physical Exam     Initial Vitals [24 1420]   BP Pulse Resp Temp SpO2   (!) 166/91 71 16 98.6 °F (37 °C) 100 %      MAP       --         Physical Exam    Nursing note and vitals reviewed.  Constitutional: She appears well-developed and well-nourished. No distress.   HENT:    Mouth/Throat: Oropharynx is clear and moist.   Eyes: Conjunctivae are normal.   Cardiovascular:  Normal rate, regular rhythm and intact distal pulses.           Pulmonary/Chest: Breath sounds normal. No respiratory distress. She has no wheezes. She has no rales.   Abdominal: Abdomen is soft. Bowel sounds are normal. She exhibits no distension. There is no abdominal tenderness. There is no rebound.   Musculoskeletal:         General: No edema.     Lymphadenopathy:     She has no cervical adenopathy.   Neurological: She is alert and oriented to person, place, and time.   Skin: Skin is warm. No rash noted.         ED Course   Procedures  Labs Reviewed   INFLUENZA A & B BY MOLECULAR   SARS-COV-2 RNA AMPLIFICATION, QUAL   HIV 1 / 2 ANTIBODY   HEPATITIS C ANTIBODY   POCT URINE PREGNANCY          Imaging Results    None          Medications - No data to display  Medical Decision Making  Urgent evaluation a 38-year-old female history above presenting today for fever, congestion, mild cough.    Vital signs stable without hypoxemia.  Patient is well-appearing, no acute distress.  Reports multiple sick contacts at home.    Differential includes but not limited to COVID, flu, viral syndrome, less likely pneumonia based on current exam and vitals.    Plan for COVID, flu testing.    Amount and/or Complexity of Data Reviewed  Labs: ordered. Decision-making details documented in ED Course.    Risk  OTC drugs.               ED Course as of 01/02/24 1659 Tue Jan 02, 2024   1657 Influenza A, Molecular: Negative [GM]   1657 Influenza B, Molecular: Negative [GM]   1657 SARS-CoV-2 RNA, Amplification, Qual: Negative [GM]   1657 COVID and flu testing negative.  Likely has a viral syndrome.  Recommend continue Tylenol/ibuprofen as needed for symptom management.  Can consider repeat testing if still febrile in the next 24-48 hours.  Return precautions given. [GM]      ED Course User Index  [GM] Ernestine Rutledge MD                            Clinical Impression:  Final diagnoses:  [B34.9] Viral syndrome (Primary)          ED Disposition Condition    Discharge Stable          ED Prescriptions    None       Follow-up Information       Follow up With Specialties Details Why Contact Info    Nikita Trinidad MD Internal Medicine Schedule an appointment as soon as possible for a visit   [address] [place] [id number]  [phone]               Ernestine Rutledge MD  01/02/24 5787

## 2024-01-02 NOTE — FIRST PROVIDER EVALUATION
Emergency Department TeleTriage Encounter Note      CHIEF COMPLAINT    Chief Complaint   Patient presents with    Generalized Body Aches    Chills       VITAL SIGNS   Initial Vitals [01/02/24 1420]   BP Pulse Resp Temp SpO2   (!) 166/91 71 16 98.6 °F (37 °C) 100 %      MAP       --            ALLERGIES    Review of patient's allergies indicates:  No Known Allergies    PROVIDER TRIAGE NOTE  Verbal consent for the teletriage evaluation was given by the patient at the start of the evaluation.  All efforts will be made to maintain patient's privacy during the evaluation.      This is a teletriage evaluation of a 38 y.o. female presenting to the ED with c/o body aches, nausea, and chills for 2 days. Limited physical exam via telehealth: The patient is awake, alert, answering questions appropriately and is not in respiratory distress.  As the Teletriage provider, I performed an initial assessment and ordered appropriate labs and imaging studies, if any, to facilitate the patient's care once placed in the ED. Once a room is available, care and a full evaluation will be completed by an alternate ED provider.  Any additional orders and the final disposition will be determined by that provider.  All imaging and labs will not be followed-up by the Teletriage Team, including myself.          ORDERS  Labs Reviewed   HIV 1 / 2 ANTIBODY   HEPATITIS C ANTIBODY       ED Orders (720h ago, onward)      Start Ordered     Status Ordering Provider    01/02/24 1539 01/02/24 1538  POCT urine pregnancy  Once         Ordered DORINA VERDE    01/02/24 1539 01/02/24 1538  COVID-19 Rapid Screening  STAT         Ordered DORINA VERDE    01/02/24 1539 01/02/24 1538  Influenza A & B by Molecular  Once         Ordered DORINA VERDE    01/02/24 1422 01/02/24 1421  HIV 1/2 Ag/Ab (4th Gen)  STAT         Ordered CHRISOTFER TAYLOR    01/02/24 1422 01/02/24 1421  Hepatitis C Antibody  STAT         Ordered CHRISTOFER TAYLOR              Virtual  Visit Note: The provider triage portion of this emergency department evaluation and documentation was performed via [employer], a HIPAA-compliant telemedicine application, in concert with a tele-presenter in the room. A face to face patient evaluation with one of my colleagues will occur once the patient is placed in an emergency department room.      DISCLAIMER: This note was prepared with [employer] voice recognition transcription software. Garbled syntax, mangled pronouns, and other bizarre constructions may be attributed to that software system.

## 2024-01-02 NOTE — ED TRIAGE NOTES
Patient is a 38-year-old female presents to the ED with c/o generalized body aches and chills. Patient states she hasn't been feeling good since yesterday. Patient denies denies SOB or chest pain.

## 2024-01-02 NOTE — DISCHARGE INSTRUCTIONS
Your COVID and flu test are both negative.  Continue to take Tylenol/ibuprofen as needed for your symptoms.  Consider retesting in 24-48 hours if he continued to have fever.  You can follow-up at urgent care or your PCP.  Return to emergency department for any concerning symptoms

## 2024-01-08 ENCOUNTER — PATIENT OUTREACH (OUTPATIENT)
Dept: EMERGENCY MEDICINE | Facility: OTHER | Age: 39
End: 2024-01-08
Payer: COMMERCIAL

## 2024-06-05 ENCOUNTER — HOSPITAL ENCOUNTER (EMERGENCY)
Facility: OTHER | Age: 39
Discharge: HOME | End: 2024-06-05
Attending: EMERGENCY MEDICINE
Payer: COMMERCIAL

## 2024-06-05 VITALS
HEART RATE: 75 BPM | HEIGHT: 63 IN | OXYGEN SATURATION: 97 % | BODY MASS INDEX: 36.83 KG/M2 | RESPIRATION RATE: 14 BRPM | SYSTOLIC BLOOD PRESSURE: 120 MMHG | TEMPERATURE: 98 F | DIASTOLIC BLOOD PRESSURE: 77 MMHG | WEIGHT: 207.88 LBS

## 2024-06-05 DIAGNOSIS — B34.9 VIRAL SYNDROME: Primary | ICD-10-CM

## 2024-06-05 LAB
CTP QC/QA: YES
CTP QC/QA: YES
GROUP A STREP, MOLECULAR: NEGATIVE
POC MOLECULAR INFLUENZA A AGN: NEGATIVE
POC MOLECULAR INFLUENZA B AGN: NEGATIVE
SARS-COV-2 RDRP RESP QL NAA+PROBE: NEGATIVE

## 2024-06-05 PROCEDURE — 99283 EMERGENCY DEPT VISIT LOW MDM: CPT

## 2024-06-05 PROCEDURE — 87635 SARS-COV-2 COVID-19 AMP PRB: CPT | Performed by: EMERGENCY MEDICINE

## 2024-06-05 PROCEDURE — 25000003 PHARM REV CODE 250: Performed by: EMERGENCY MEDICINE

## 2024-06-05 PROCEDURE — 87651 STREP A DNA AMP PROBE: CPT | Performed by: EMERGENCY MEDICINE

## 2024-06-05 RX ORDER — ACETAMINOPHEN 500 MG
1000 TABLET ORAL
Status: COMPLETED | OUTPATIENT
Start: 2024-06-05 | End: 2024-06-05

## 2024-06-05 RX ADMIN — ACETAMINOPHEN 1000 MG: 500 TABLET ORAL at 06:06

## 2024-06-05 NOTE — Clinical Note
"Azucena Knappe" [place] was seen and treated in our emergency department on 6/5/2024.  She may return to work on 06/07/2024.       If you have any questions or concerns, please don't hesitate to call.      Obdulia Jane LPN"

## 2024-06-05 NOTE — ED TRIAGE NOTES
C/O generalized body aches, productive cough, frontal HA, sore throat x 3 days. Hx migraines. Denies relief with OTC.

## 2024-06-05 NOTE — ED PROVIDER NOTES
Chief complaint:  COVID-19 Concerns (Body aches, congestion, headache and a sore throat X3 days. )      HPI:  Azucena Magana is a 39 y.o. female presenting with fatigue, chills, body aches, cough, sore throat.  These symptoms began about 3 days ago.  Patient reports that her sister has similar symptoms and was diagnosed with the flu.  Patient denies recent travel.  Patient did not try medications prior to arrival.  Patient denies sputum production.    ROS: As per HPI and below:    Constitutional: - fever, +chills.  Eyes: No discharge. No pain.  HENT: no nasal congestion, -anosmia; + sore throat.   Cardiovascular: - chest pain, no palpitations.  Respiratory: +cough, -shortness of breath.  Gastrointestinal: -nausea, no diarrhea, - abdominal pain, -vomiting.   Genitourinary: No hematuria, dysuria, urgency.  Musculoskeletal: No back pain.  Skin: No rashes, no lesions.  Neurological: +headache, no focal weakness.    Review of patient's allergies indicates:  No Known Allergies    No current facility-administered medications on file prior to encounter.     Current Outpatient Medications on File Prior to Encounter   Medication Sig Dispense Refill    hydroCHLOROthiazide (HYDRODIURIL) 25 MG tablet Take 1 tablet (25 mg total) by mouth once daily. 90 tablet 0    NIFEdipine (PROCARDIA-XL) 30 MG (OSM) 24 hr tablet TAKE 1 TABLET(30 MG) BY MOUTH EVERY DAY 30 tablet 0    buPROPion (WELLBUTRIN XL) 150 MG TB24 tablet Take 1 tablet (150 mg total) by mouth every morning. 30 tablet 5    ibuprofen (ADVIL,MOTRIN) 600 MG tablet Take 1 tablet (600 mg total) by mouth every 8 (eight) hours as needed for Pain. 20 tablet 0    ibuprofen (ADVIL,MOTRIN) 600 MG tablet Take 1 tablet (600 mg total) by mouth every 6 (six) hours as needed for Pain. 20 tablet 0    methocarbamoL (ROBAXIN) 500 MG Tab Take 1-2 tablets as needed for spasms 20 tablet 0    oxyCODONE-acetaminophen (PERCOCET) 5-325 mg per tablet Take 1 tablet by mouth every 4 (four) hours  as needed for Pain. (Patient not taking: Reported on 2022) 20 tablet 0    [DISCONTINUED] labetaloL (NORMODYNE) 200 MG tablet Take 1 tablet (200 mg total) by mouth every 12 (twelve) hours. 60 tablet 11       PMH:  As per HPI and below:  Past Medical History:   Diagnosis Date    Abnormal Pap smear of cervix     Hypertension     Migraine headache     Obesity      Past Surgical History:   Procedure Laterality Date     SECTION N/A 3/10/2021    Procedure:  SECTION;  Surgeon: Joi Najera MD;  Location: [facility] L&D OR;  Service: OB/GYN;  Laterality: N/A;     SECTION      DIAGNOSTIC LAPAROSCOPY N/A 2022    Procedure: LAPAROSCOPY, DIAGNOSTIC and DIAGNOSTIC HYSTEROSCOPY;  Surgeon: Joi Najera MD;  Location: [facility] OR;  Service: OB/GYN;  Laterality: N/A;    VA TREAT ECTOPIC PREG,NON REMVAL Right 7/12/15    Right salpingectomy and partial cornual resection       Social History     Socioeconomic History    Marital status: Single   Tobacco Use    Smoking status: Never    Smokeless tobacco: Never   Substance and Sexual Activity    Alcohol use: Yes     Comment: social    Drug use: No    Sexual activity: Yes     Partners: Male     Birth control/protection: Implant     Social Determinants of Health     Financial Resource Strain: Low Risk  (2023)    Overall Financial Resource Strain (CARDIA)     Difficulty of Paying Living Expenses: Not hard at all   Food Insecurity: No Food Insecurity (2023)    Hunger Vital Sign     Worried About Running Out of Food in the Last Year: Never true     Ran Out of Food in the Last Year: Never true   Transportation Needs: No Transportation Needs (2023)    PRAPARE - Transportation     Lack of Transportation (Medical): No     Lack of Transportation (Non-Medical): No   Stress: Stress Concern Present (2023)    [employer] - Occupational Stress Questionnaire     Feeling of Stress : To some extent   Housing Stability: Unknown  (6/7/2023)    Housing Stability Vital Sign     Unable to Pay for Housing in the Last Year: No     Unstable Housing in the Last Year: No       Family History   Problem Relation Name Age of Onset    Ovarian cancer Maternal Grandmother      Colon cancer Neg Hx      Breast cancer Neg Hx         Physical Exam:    Vitals:    06/05/24 0622   BP: 123/79   Pulse: 85   Resp: 17   Temp: 98.9 °F (37.2 °C)         General Appearance: The patient is alert, has no immediate or signs of toxicity. No acute distress.    HEENT:  Extra ocular movements intact. No drainage.   Neck: No stridor.   Respiratory: No increased work of breathing, speaking in full sentences  CV: nml perfusion, no elevated JVD  Gastrointestinal:   No guarding  Neurological: Alert and appropriate, moving all extremities spontaneously  Skin: Warm and dry, no rashes.  Musculoskeletal: Extremities without notable edema, appropriate ROM    Psych: Normal affect, no evidence of psychosis    Labs Reviewed   GROUP A STREP, MOLECULAR   SARS-COV-2 RDRP GENE   POCT INFLUENZA A/B MOLECULAR         MDM:    39 y.o. female with symptoms consistent with viral syndrome.  Strep, COVID, flu negative here.  Patient given antipyretics and feeling improved.  Instructed on supportive care precautions, analgesics and hydration.  Patient has no signs of serious bacterial infection at this time.    Medications   acetaminophen tablet 1,000 mg (1,000 mg Oral Given 6/5/24 0641)            Diagnoses:    1. Viral syndrome                 Dina Carpenter MD  06/05/24 0740       Dina Carpenter MD  06/05/24 0802

## (undated) DEVICE — PACK LAPAROSCOPY/PELVISCOPY II

## (undated) DEVICE — BLANKET UPPER BODY 78.7X29.9IN

## (undated) DEVICE — TRAY FOLEY 16FR INFECTION CONT

## (undated) DEVICE — TROCAR KII BLLN 12MM 10CM

## (undated) DEVICE — TOWEL OR DISP STRL BLUE 4/PK

## (undated) DEVICE — GLOVE SURGICAL LATEX SZ 6.5

## (undated) DEVICE — SEE MEDLINE ITEM 156946

## (undated) DEVICE — ELECTRODE REM PLYHSV RETURN 9

## (undated) DEVICE — PACK FLUENT DISPOSABLE

## (undated) DEVICE — BLADE SURG CARBON STEEL SZ11

## (undated) DEVICE — Device

## (undated) DEVICE — CANISTER SUCTION 2 LTR

## (undated) DEVICE — UNDERGLOVES BIOGEL PI SZ 7 LF

## (undated) DEVICE — CLOSURE SKIN STERI STRIP 1/2X4

## (undated) DEVICE — SOL IRR NACL .9% 3000ML

## (undated) DEVICE — APPLICATOR CHLORAPREP ORN 26ML

## (undated) DEVICE — SYR 10CC LUER LOCK

## (undated) DEVICE — SEE MEDLINE ITEM 154981

## (undated) DEVICE — GLOVE SURGICAL LATEX SZ 6

## (undated) DEVICE — GLOVE BIOGEL ECLIPSE SZ 7.5

## (undated) DEVICE — CATH SELF-CATH FEMALE 14FR 6IN

## (undated) DEVICE — PAD SANITARY OB STERILE

## (undated) DEVICE — SUPPORT ULNA NERVE PROTECTOR

## (undated) DEVICE — COVER OVERHEAD SURG LT BLUE

## (undated) DEVICE — KIT ANTIFOG

## (undated) DEVICE — SEE MEDLINE ITEM 157110

## (undated) DEVICE — SEE MEDLINE ITEM 157181

## (undated) DEVICE — JELLY LUBRICANT STERILE 4 OZ

## (undated) DEVICE — PAD PREP 50/CA

## (undated) DEVICE — ADHESIVE MASTISOL VIAL 48/BX

## (undated) DEVICE — DRESSING TELFA N ADH 3X8

## (undated) DEVICE — TUBING INSUFFLATION 10

## (undated) DEVICE — UNDERGLOVES BIOGEL PI SZ 6 LF

## (undated) DEVICE — SEE L#120831

## (undated) DEVICE — SEALER LIGASURE LAP 37CM 5MM

## (undated) DEVICE — POSITIONER HEAD ADULT